# Patient Record
Sex: FEMALE | Race: WHITE | Employment: FULL TIME | URBAN - METROPOLITAN AREA
[De-identification: names, ages, dates, MRNs, and addresses within clinical notes are randomized per-mention and may not be internally consistent; named-entity substitution may affect disease eponyms.]

---

## 2019-07-18 ENCOUNTER — OFFICE VISIT (OUTPATIENT)
Dept: ORTHOPEDIC SURGERY | Age: 44
End: 2019-07-18
Payer: COMMERCIAL

## 2019-07-18 VITALS
SYSTOLIC BLOOD PRESSURE: 133 MMHG | DIASTOLIC BLOOD PRESSURE: 81 MMHG | WEIGHT: 293 LBS | BODY MASS INDEX: 45.99 KG/M2 | HEART RATE: 90 BPM | HEIGHT: 67 IN

## 2019-07-18 DIAGNOSIS — M72.2 PLANTAR FASCIITIS OF RIGHT FOOT: Primary | ICD-10-CM

## 2019-07-18 PROCEDURE — 1036F TOBACCO NON-USER: CPT | Performed by: PODIATRIST

## 2019-07-18 PROCEDURE — L4361 PNEUMA/VAC WALK BOOT PRE OTS: HCPCS | Performed by: PODIATRIST

## 2019-07-18 PROCEDURE — G8427 DOCREV CUR MEDS BY ELIG CLIN: HCPCS | Performed by: PODIATRIST

## 2019-07-18 PROCEDURE — G8417 CALC BMI ABV UP PARAM F/U: HCPCS | Performed by: PODIATRIST

## 2019-07-18 PROCEDURE — 99203 OFFICE O/P NEW LOW 30 MIN: CPT | Performed by: PODIATRIST

## 2019-07-18 RX ORDER — FUROSEMIDE 40 MG/1
TABLET ORAL
COMMUNITY
Start: 2019-06-30

## 2019-07-18 RX ORDER — PANTOPRAZOLE SODIUM 40 MG/1
40 TABLET, DELAYED RELEASE ORAL
COMMUNITY
Start: 2019-05-15

## 2019-07-18 RX ORDER — BUTALBITAL, ACETAMINOPHEN AND CAFFEINE 50; 325; 40 MG/1; MG/1; MG/1
TABLET ORAL
COMMUNITY
Start: 2019-01-03

## 2019-07-18 RX ORDER — DOXYCYCLINE HYCLATE 100 MG
TABLET ORAL
COMMUNITY
Start: 2019-07-18 | End: 2019-08-17

## 2019-07-18 RX ORDER — METFORMIN HYDROCHLORIDE 750 MG/1
TABLET, EXTENDED RELEASE ORAL
COMMUNITY
Start: 2016-12-18

## 2019-07-18 RX ORDER — FLUTICASONE PROPIONATE 50 MCG
1 SPRAY, SUSPENSION (ML) NASAL
COMMUNITY
Start: 2017-01-31 | End: 2020-03-07

## 2019-07-18 RX ORDER — NABUMETONE 750 MG/1
TABLET, FILM COATED ORAL
COMMUNITY
Start: 2019-04-29

## 2019-07-18 RX ORDER — MULTIVITAMIN
1 TABLET ORAL
COMMUNITY

## 2019-07-18 RX ORDER — SPIRONOLACTONE 25 MG/1
TABLET ORAL
COMMUNITY
Start: 2019-07-08

## 2019-07-18 RX ORDER — METOPROLOL TARTRATE 50 MG/1
TABLET, FILM COATED ORAL
Refills: 3 | COMMUNITY
Start: 2019-06-02

## 2019-07-18 SDOH — HEALTH STABILITY: MENTAL HEALTH: HOW OFTEN DO YOU HAVE A DRINK CONTAINING ALCOHOL?: MONTHLY OR LESS

## 2019-07-18 SDOH — HEALTH STABILITY: MENTAL HEALTH: HOW MANY STANDARD DRINKS CONTAINING ALCOHOL DO YOU HAVE ON A TYPICAL DAY?: 5 OR 6

## 2019-07-22 ENCOUNTER — TELEPHONE (OUTPATIENT)
Dept: ORTHOPEDIC SURGERY | Age: 44
End: 2019-07-22

## 2019-08-27 ENCOUNTER — OFFICE VISIT (OUTPATIENT)
Dept: ORTHOPEDIC SURGERY | Age: 44
End: 2019-08-27
Payer: COMMERCIAL

## 2019-08-27 VITALS
HEIGHT: 67 IN | WEIGHT: 293 LBS | DIASTOLIC BLOOD PRESSURE: 96 MMHG | SYSTOLIC BLOOD PRESSURE: 142 MMHG | BODY MASS INDEX: 45.99 KG/M2 | HEART RATE: 61 BPM

## 2019-08-27 DIAGNOSIS — M79.671 RIGHT FOOT PAIN: Primary | ICD-10-CM

## 2019-08-27 PROCEDURE — 99213 OFFICE O/P EST LOW 20 MIN: CPT | Performed by: NURSE PRACTITIONER

## 2019-08-27 PROCEDURE — 1036F TOBACCO NON-USER: CPT | Performed by: NURSE PRACTITIONER

## 2019-08-27 PROCEDURE — L3260 AMBULATORY SURGICAL BOOT EAC: HCPCS | Performed by: NURSE PRACTITIONER

## 2019-08-27 PROCEDURE — G8428 CUR MEDS NOT DOCUMENT: HCPCS | Performed by: NURSE PRACTITIONER

## 2019-08-27 PROCEDURE — E0114 CRUTCH UNDERARM PAIR NO WOOD: HCPCS | Performed by: NURSE PRACTITIONER

## 2019-08-27 PROCEDURE — G8417 CALC BMI ABV UP PARAM F/U: HCPCS | Performed by: NURSE PRACTITIONER

## 2019-08-28 NOTE — PROGRESS NOTES
[facility] Orthopedic Surgery  After Hours Clinic Note      CHIEF COMPLAINT:  Right heel pain after injury    History Obtained From:  patient    HISTORY OF PRESENT ILLNESS:    The patient is a 40 y.o. female who presents with acute onset heel pain. Foot Pain: Patient complains of right ankle pain. Onset of the symptoms was last night. Inciting event: injured while she was taking the trash out in the dark and heard something in the woods and turned and ran fast.. She felt a pop in the bottom of her heel. She has been unable to bear weight. Current symptoms include inability to bear weight and pain in the bottom of her heel. Aggravating symptoms: any weight bearing and walking. Rates pain a 7-8/10 VAS. Patient's course of pain: severe and not improving. Patient has had prior foot problems, plantar fasciitis of the right foot managed by Dr Maciel Boland in 7/2019 and was given a boot that does not fit d/t her calf size. Previous visits for this problem: none. Evaluation to date: none. Treatment to date: rest.         Past Medical History:    No past medical history on file. Past Surgical History:    No past surgical history on file. Social History     Tobacco Use    Smoking status: Never Smoker    Smokeless tobacco: Never Used   Substance Use Topics    Alcohol use: Yes     Frequency: Monthly or less     Drinks per session: 5 or 6       No family history on file. Current Medications:   No current facility-administered medications for this visit.    Current Outpatient Medications   Medication Sig Dispense Refill    butalbital-acetaminophen-caffeine (FIORICET, ESGIC) -40 MG per tablet TAKE 1 TABLET BY MOUTH THREE TIMES A DAY AS NEEDED      nabumetone (RELAFEN) 750 MG tablet TAKE 1 TABLET BY MOUTH TWICE A DAY      furosemide (LASIX) 40 MG tablet       furosemide (LASIX) 40 MG tablet TAKE 1 TABLET BY MOUTH EVERY DAY AS NEEDED      spironolactone (ALDACTONE) 25 MG tablet       metoprolol tartrate (LOPRESSOR) 50 MG tablet TAKE 1 TABLET BY MOUTH TWICE A DAY  3    metFORMIN (GLUCOPHAGE-XR) 750 MG extended release tablet TAKE 2 TABLETS (1,500 MG) BY ORAL ROUTE ONCE DAILY WITH THE EVENING MEAL FOR 90 DAYS      pantoprazole (PROTONIX) 40 MG tablet Take 40 mg by mouth      Multiple Vitamin (MULTIVITAMIN) tablet Take 1 tablet by mouth      fluticasone (FLONASE) 50 MCG/ACT nasal spray 1 spray by Nasal route      albuterol sulfate (PROAIR RESPICLICK) 645 (90 Base) MCG/ACT aerosol powder inhalation Inhale 2 puffs into the lungs       No current facility-administered medications for this visit. Allergies:  Morphine    REVIEW OF SYSTEMS:   CONSTITUTIONAL: Denies unexplained weight loss, fevers, chills or fatigue  NEUROLOGICAL: Denies unsteady gait or progressive weakness  MUSCULOSKELETAL: See HPI  PSYCHOLOGICAL: Denies anxiety, depression   SKIN: Denies skin changes, delayed healing, rash, itching   HEMATOLOGIC: Denies easy bleeding or bruising  ENDOCRINE: Denies excessive thirst, urination, heat/cold  RESPIRATORY: Denies current dyspnea, cough  GI: Denies nausea, vomiting, diarrhea   : Denies bowel or bladder issues       PHYSICAL EXAM:    VITALS:  BP (!) 142/96   Pulse 61   Ht 5' 7\" (1.702 m)   Wt 296 lb (134.3 kg)   BMI 46.36 kg/m²     Ms. Eduard Green is a very pleasant 40 y.o.  female who presents today in no acute distress, awake, alert, and oriented. She is well dressed, nourished and  groomed. Patient with normal affect. Height is  5' 7\" (1.702 m), weight is 296 lb (134.3 kg), Body mass index is 46.36 kg/m². Resting respiratory rate is 16. Skin warm and dry. Resp deep and easy. Pulse is with regular rate and rhythm        MUSCULOSKELETAL:    She ambulates NWB right foot d/t pain. On evaluation of the right foot, there is no swelling or bruising. Skin is intact in the right lower extremity. She has decreased ROM right foot and ankle d/t pain.  She can dorsiflex 5 degrees from neutral.

## 2019-09-06 ENCOUNTER — OFFICE VISIT (OUTPATIENT)
Dept: ORTHOPEDIC SURGERY | Age: 44
End: 2019-09-06
Payer: COMMERCIAL

## 2019-09-06 VITALS — BODY MASS INDEX: 45.99 KG/M2 | WEIGHT: 293 LBS | HEIGHT: 67 IN | RESPIRATION RATE: 16 BRPM

## 2019-09-06 DIAGNOSIS — M72.2 PLANTAR FASCIITIS: Primary | ICD-10-CM

## 2019-09-06 PROCEDURE — G8417 CALC BMI ABV UP PARAM F/U: HCPCS | Performed by: ORTHOPAEDIC SURGERY

## 2019-09-06 PROCEDURE — 1036F TOBACCO NON-USER: CPT | Performed by: ORTHOPAEDIC SURGERY

## 2019-09-06 PROCEDURE — 99213 OFFICE O/P EST LOW 20 MIN: CPT | Performed by: ORTHOPAEDIC SURGERY

## 2019-09-06 PROCEDURE — G8427 DOCREV CUR MEDS BY ELIG CLIN: HCPCS | Performed by: ORTHOPAEDIC SURGERY

## 2019-09-06 PROCEDURE — L3170 FOOT PLAS HEEL STABI PRE OTS: HCPCS | Performed by: ORTHOPAEDIC SURGERY

## 2019-09-17 PROBLEM — M72.2 PLANTAR FASCIITIS: Status: ACTIVE | Noted: 2019-09-17

## 2019-10-18 ENCOUNTER — OFFICE VISIT (OUTPATIENT)
Dept: ORTHOPEDIC SURGERY | Age: 44
End: 2019-10-18
Payer: COMMERCIAL

## 2019-10-18 VITALS — BODY MASS INDEX: 45.99 KG/M2 | HEIGHT: 67 IN | RESPIRATION RATE: 16 BRPM | WEIGHT: 293 LBS

## 2019-10-18 DIAGNOSIS — M72.2 PLANTAR FASCIITIS OF RIGHT FOOT: Primary | ICD-10-CM

## 2019-10-18 PROCEDURE — G8417 CALC BMI ABV UP PARAM F/U: HCPCS | Performed by: ORTHOPAEDIC SURGERY

## 2019-10-18 PROCEDURE — G8427 DOCREV CUR MEDS BY ELIG CLIN: HCPCS | Performed by: ORTHOPAEDIC SURGERY

## 2019-10-18 PROCEDURE — 1036F TOBACCO NON-USER: CPT | Performed by: ORTHOPAEDIC SURGERY

## 2019-10-18 PROCEDURE — 99213 OFFICE O/P EST LOW 20 MIN: CPT | Performed by: ORTHOPAEDIC SURGERY

## 2019-10-18 PROCEDURE — G8484 FLU IMMUNIZE NO ADMIN: HCPCS | Performed by: ORTHOPAEDIC SURGERY

## 2021-01-21 ENCOUNTER — OFFICE VISIT (OUTPATIENT)
Dept: ORTHOPEDIC SURGERY | Age: 46
End: 2021-01-21
Payer: COMMERCIAL

## 2021-01-21 VITALS — TEMPERATURE: 97.1 F

## 2021-01-21 DIAGNOSIS — M76.821 TIBIALIS POSTERIOR TENDINITIS, RIGHT: ICD-10-CM

## 2021-01-21 DIAGNOSIS — M79.671 RIGHT FOOT PAIN: Primary | ICD-10-CM

## 2021-01-21 DIAGNOSIS — M25.571 RIGHT ANKLE PAIN, UNSPECIFIED CHRONICITY: ICD-10-CM

## 2021-01-21 PROCEDURE — G8484 FLU IMMUNIZE NO ADMIN: HCPCS | Performed by: PHYSICIAN ASSISTANT

## 2021-01-21 PROCEDURE — G8421 BMI NOT CALCULATED: HCPCS | Performed by: PHYSICIAN ASSISTANT

## 2021-01-21 PROCEDURE — 1036F TOBACCO NON-USER: CPT | Performed by: PHYSICIAN ASSISTANT

## 2021-01-21 PROCEDURE — 99212 OFFICE O/P EST SF 10 MIN: CPT | Performed by: PHYSICIAN ASSISTANT

## 2021-01-21 PROCEDURE — G8427 DOCREV CUR MEDS BY ELIG CLIN: HCPCS | Performed by: PHYSICIAN ASSISTANT

## 2021-01-21 PROCEDURE — L1902 AFO ANKLE GAUNTLET PRE OTS: HCPCS | Performed by: PHYSICIAN ASSISTANT

## 2021-01-22 NOTE — PROGRESS NOTES
Subjective:      Patient ID: Kourtney Al is a 39 y.o. female. Chief Complaint   Patient presents with    Foot Pain     Right foot        HPI:   She is here for an initial evaluation of a new problem. Right medial foot and ankle pain. Injured on 1/20/2021, rolled ankle and foot avoiding tripping over a cat. Pain with weightbearing. Pain Scale 5/10 VAS. Location of pain over the medial ankle and foot. Pain is worse with activity. Pain improves with rest.   Previous treatments have included Tylenol with minimal improvement. Review Of Systems:   A 14 point review of systems and history form completed by the patient has been reviewed. This scanned in the media tab of the patient's chart under today's date. As noted in the HPI. Negative for fever or chills. Negative for joint pain, swelling and stiffness. Negative for numbness or tingling. History reviewed. No pertinent past medical history. History reviewed. No pertinent family history. History reviewed. No pertinent surgical history.     Social History     Occupational History    Not on file   Tobacco Use    Smoking status: Never Smoker    Smokeless tobacco: Never Used   Substance and Sexual Activity    Alcohol use: Yes     Frequency: Monthly or less     Drinks per session: 5 or 6    Drug use: Never    Sexual activity: Yes     Partners: Male       Current Outpatient Medications   Medication Sig Dispense Refill    butalbital-acetaminophen-caffeine (FIORICET, ESGIC) -40 MG per tablet TAKE 1 TABLET BY MOUTH THREE TIMES A DAY AS NEEDED      nabumetone (RELAFEN) 750 MG tablet TAKE 1 TABLET BY MOUTH TWICE A DAY      furosemide (LASIX) 40 MG tablet       furosemide (LASIX) 40 MG tablet TAKE 1 TABLET BY MOUTH EVERY DAY AS NEEDED      spironolactone (ALDACTONE) 25 MG tablet       metoprolol tartrate (LOPRESSOR) 50 MG tablet TAKE 1 TABLET BY MOUTH TWICE A DAY  3  metFORMIN (GLUCOPHAGE-XR) 750 MG extended release tablet TAKE 2 TABLETS (1,500 MG) BY ORAL ROUTE ONCE DAILY WITH THE EVENING MEAL FOR 90 DAYS      pantoprazole (PROTONIX) 40 MG tablet Take 40 mg by mouth      Multiple Vitamin (MULTIVITAMIN) tablet Take 1 tablet by mouth      fluticasone (FLONASE) 50 MCG/ACT nasal spray 1 spray by Nasal route      albuterol sulfate (PROAIR RESPICLICK) 323 (90 Base) MCG/ACT aerosol powder inhalation Inhale 2 puffs into the lungs       No current facility-administered medications for this visit. Objective:     She is alert, oriented x 3, pleasant, well nourished, developed and in no   acute distress. Temp 97.1 °F (36.2 °C) (Temporal)      Examination of the right foot and  ankle demonstrates: There is mild swelling of the medial ankle. There is no joint effusion. There is no tenderness of the lateral malleolus. There is  moderate tenderness over the posterior tibialis tendon region. There is moderate pronation in stance. There is no tenderness of the fifth metatarsal.  There is no tenderness over the ATFL. There is no tenderness over the proximal fibula. There is no tenderness of the calcaneous. The achilles is intact. Rivera test negative. There is no laxity with anterior drawer testing. There is no laxity with Inversion testing. There is no laxity with Eversion testing. Examination of the upper extremities are intact with sensation to light touch. Motor testing  5/5 in all major motor groups including hand intrinsics. Radial, Median and Ulnar nerves are intact. Patel's Sign absent. Examination of the upper extremities shows intact perfusion to all extremities. No cyanosis. Digits are warm to touch. Capillary refill is less than 2 seconds. There is no edema noted. Examination of the skin over the upper extremities:  Reveals the skin to be intact without lacerations or abrasions. There are no significant erythema, rashes or skin lesions. X Rays: performed in the office today:   AP, Lateral and Oblique of the Right Foot:  Radiographs demonstrate normal bony alignment of the foot. There is no radiographic evidence of a fracture or dislocation. AP, Lateral and Oblique Right Ankle:  Radiographs demonstrate normal alignment of the ankle joint. There are no fractures or dislocations noted. Additional Tests reviewed: None  Additional Outside Records reviewed: None    Diagnosis:       ICD-10-CM    1. Right foot pain  M79.671 XR FOOT RIGHT (MIN 3 VIEWS)   2. Right ankle pain, unspecified chronicity  M25.571 XR ANKLE RIGHT (MIN 3 VIEWS)   3. Tibialis posterior tendinitis, right  M76.821 Aircast Airlift PTTD Ankle Brace        Assessment and Plan:       Assessment:  Right medial foot and ankle pain felt to be related to a strain of the posterior tibialis tendon. 15 minutes was the total time spent on today's visit  including reviewing test results, obtaining or reviewing history, physical exam, time spent on documentation or ordering prescriptions, tests and procedures after the visit. Plan:  Medications- OTC NSAIDS discussed. She  was advised that NSAID-type medications have two very important potential side effects: gastrointestinal irritation including hemorrhage and renal injuries. She was asked to take the medication with food and to stop if she experiences any GI upset. I asked her to call for vomiting, abdominal pain or black/bloody stools. She should have renal function testing per his medical provider periodically. The patient expresses understanding of these issues and questions were answered. PT- A home exercise program was instructed today including ROM exercises and strengthening exercises. The patient verbalized understanding of these exercises as well as the importance of the exercise program to promote return of normal function. If pain intensifies or other problems arise you are to notify the office. Further Imaging- none    Procedures-recommend bracing in a posterior tibialis tendon dysfunctional brace      Procedures    Aircast Airlift PTTD Ankle Brace     Patient was prescribed an Aircast Airlift PTTD Brace. The right ankle will require stabilization / immobilization from this semi-rigid / rigid orthosis to improve their function. The orthosis will assist in protecting the affected area, provide functional support and facilitate healing. Patient was instructed to progress ambulation weight bearing as tolerated in the device. The patient was educated and fit by a healthcare professional with expert knowledge and specialization in brace application while under the direct supervision of the treating physician. Verbal and written instructions for the use of and application of this item were provided. They were instructed to contact the office immediately should the brace result in increased pain, decreased sensation, increased swelling or worsening of the condition. Follow up- 2 weeks. Call or return to clinic if these symptoms worsen or fail to improve as anticipated.

## 2022-11-29 ENCOUNTER — TELEPHONE (OUTPATIENT)
Dept: FAMILY MEDICINE CLINIC | Age: 47
End: 2022-11-29

## 2022-11-29 NOTE — TELEPHONE ENCOUNTER
----- Message from Jessee Nikolay sent at 11/29/2022  8:59 AM EST -----  Subject: Appointment Request    Reason for Call: New Patient/New to Provider Appointment needed: New   Patient Request Appointment    QUESTIONS    Reason for appointment request? Requested Provider unavailable - Jessica Handing      Additional Information for Provider? Patient was referred by a friend to   see Dr Angelo Briones. She was wanting to know if she can establish care.    Please advise.   ---------------------------------------------------------------------------  --------------  Jigar Morgan [facility]  [phone]; OK to leave message on voicemail  ---------------------------------------------------------------------------  --------------  SCRIPT ISATU ABDIID Screen: Alf Pearl

## 2023-08-11 ENCOUNTER — OFFICE VISIT (OUTPATIENT)
Dept: FAMILY MEDICINE CLINIC | Age: 48
End: 2023-08-11
Payer: COMMERCIAL

## 2023-08-11 VITALS
RESPIRATION RATE: 16 BRPM | BODY MASS INDEX: 45.99 KG/M2 | HEART RATE: 75 BPM | HEIGHT: 67 IN | DIASTOLIC BLOOD PRESSURE: 78 MMHG | OXYGEN SATURATION: 98 % | SYSTOLIC BLOOD PRESSURE: 122 MMHG | WEIGHT: 293 LBS

## 2023-08-11 DIAGNOSIS — R53.83 FATIGUE, UNSPECIFIED TYPE: ICD-10-CM

## 2023-08-11 DIAGNOSIS — E61.1 IRON DEFICIENCY: Primary | ICD-10-CM

## 2023-08-11 DIAGNOSIS — R73.03 PREDIABETES: ICD-10-CM

## 2023-08-11 DIAGNOSIS — R60.0 BILATERAL LOWER EXTREMITY EDEMA: ICD-10-CM

## 2023-08-11 PROCEDURE — 1036F TOBACCO NON-USER: CPT | Performed by: INTERNAL MEDICINE

## 2023-08-11 PROCEDURE — 99204 OFFICE O/P NEW MOD 45 MIN: CPT | Performed by: INTERNAL MEDICINE

## 2023-08-11 PROCEDURE — G8427 DOCREV CUR MEDS BY ELIG CLIN: HCPCS | Performed by: INTERNAL MEDICINE

## 2023-08-11 PROCEDURE — G8417 CALC BMI ABV UP PARAM F/U: HCPCS | Performed by: INTERNAL MEDICINE

## 2023-08-11 RX ORDER — FERROUS SULFATE 324(65)MG
1 TABLET, DELAYED RELEASE (ENTERIC COATED) ORAL 2 TIMES DAILY
COMMUNITY
Start: 2023-03-21

## 2023-08-11 RX ORDER — KETOROLAC TROMETHAMINE 5 MG/ML
1 SOLUTION OPHTHALMIC 4 TIMES DAILY
COMMUNITY

## 2023-08-11 RX ORDER — LEVOCETIRIZINE DIHYDROCHLORIDE 5 MG/1
5 TABLET, FILM COATED ORAL EVERY EVENING
COMMUNITY
Start: 2023-07-31

## 2023-08-11 RX ORDER — METHOCARBAMOL 750 MG/1
750 TABLET, FILM COATED ORAL 4 TIMES DAILY PRN
COMMUNITY
Start: 2022-08-25

## 2023-08-11 RX ORDER — IBUPROFEN 800 MG/1
800 TABLET ORAL EVERY 6 HOURS PRN
COMMUNITY
Start: 2021-06-21

## 2023-08-11 RX ORDER — LIRAGLUTIDE 6 MG/ML
INJECTION, SOLUTION SUBCUTANEOUS
COMMUNITY
Start: 2023-07-05

## 2023-08-11 RX ORDER — ONDANSETRON 4 MG/1
4 TABLET, ORALLY DISINTEGRATING ORAL EVERY 6 HOURS PRN
COMMUNITY
Start: 2023-01-05

## 2023-08-11 ASSESSMENT — ENCOUNTER SYMPTOMS
WHEEZING: 1
CONSTIPATION: 1
EYE PAIN: 0
SHORTNESS OF BREATH: 1
NAUSEA: 0
COLOR CHANGE: 0
DIARRHEA: 1
VOMITING: 0

## 2023-08-11 ASSESSMENT — PATIENT HEALTH QUESTIONNAIRE - PHQ9
SUM OF ALL RESPONSES TO PHQ QUESTIONS 1-9: 2
SUM OF ALL RESPONSES TO PHQ9 QUESTIONS 1 & 2: 2
2. FEELING DOWN, DEPRESSED OR HOPELESS: 1
SUM OF ALL RESPONSES TO PHQ QUESTIONS 1-9: 2
SUM OF ALL RESPONSES TO PHQ QUESTIONS 1-9: 2
1. LITTLE INTEREST OR PLEASURE IN DOING THINGS: 1
SUM OF ALL RESPONSES TO PHQ QUESTIONS 1-9: 2

## 2023-08-11 NOTE — PROGRESS NOTES
rash (right cheek going to to to derm, had skin cancer). Negative for color change. Neurological:  Positive for headaches. Negative for dizziness. Psychiatric/Behavioral:  Positive for dysphoric mood. The patient is nervous/anxious. Seeing a therapist       Prior to Visit Medications    Medication Sig Taking? Authorizing Provider   levocetirizine (XYZAL) 5 MG tablet Take 1 tablet by mouth every evening Yes Historical Provider, MD   SAXENDA 18 MG/3ML SOPN USE 2.4 MG DAILY FOR 7 DAYS, THEN 3 MG DAILY  DAYS.  USE 1.8 MG DAILY Yes Historical Provider, MD   metFORMIN (GLUCOPHAGE) 1000 MG tablet Take 1 tablet by mouth 2 times daily Yes Historical Provider, MD   Ferrous Sulfate 324 MG TBEC Take 1 tablet by mouth 2 times daily Yes Historical Provider, MD   ketorolac (ACULAR) 0.5 % ophthalmic solution Apply 1 drop to eye 4 times daily Yes Historical Provider, MD   ondansetron (ZOFRAN-ODT) 4 MG disintegrating tablet Take 1 tablet by mouth every 6 hours as needed Yes Historical Provider, MD   ibuprofen (ADVIL;MOTRIN) 800 MG tablet Take 1 tablet by mouth every 6 hours as needed Yes Historical Provider, MD   methocarbamol (ROBAXIN) 750 MG tablet Take 1 tablet by mouth 4 times daily as needed Yes Historical Provider, MD   butalbital-acetaminophen-caffeine (FIORICET, ESGIC) -40 MG per tablet TAKE 1 TABLET BY MOUTH THREE TIMES A DAY AS NEEDED Yes Historical Provider, MD   nabumetone (RELAFEN) 750 MG tablet TAKE 1 TABLET BY MOUTH TWICE A DAY Yes Historical Provider, MD   furosemide (LASIX) 40 MG tablet TAKE 1 TABLET BY MOUTH EVERY DAY AS NEEDED Yes Historical Provider, MD   spironolactone (ALDACTONE) 25 MG tablet  Yes Historical Provider, MD   metoprolol tartrate (LOPRESSOR) 50 MG tablet TAKE 1 TABLET BY MOUTH TWICE A DAY Yes Historical Provider, MD   pantoprazole (PROTONIX) 40 MG tablet Take 1 tablet by mouth Yes Historical Provider, MD   Multiple Vitamin (MULTIVITAMIN) tablet Take 1 tablet by mouth Yes

## 2023-09-08 ENCOUNTER — TELEPHONE (OUTPATIENT)
Dept: FAMILY MEDICINE CLINIC | Age: 48
End: 2023-09-08

## 2023-09-08 RX ORDER — ROSUVASTATIN CALCIUM 10 MG/1
10 TABLET, COATED ORAL DAILY
Qty: 90 TABLET | Refills: 1 | Status: SHIPPED | OUTPATIENT
Start: 2023-09-08

## 2023-09-08 RX ORDER — PANTOPRAZOLE SODIUM 40 MG/1
40 TABLET, DELAYED RELEASE ORAL DAILY
Qty: 30 TABLET | Refills: 5 | Status: SHIPPED | OUTPATIENT
Start: 2023-09-08

## 2023-09-08 NOTE — TELEPHONE ENCOUNTER
Pt is needing refills on Pantoprazole and Rosuvastatin Calcium 10 mg tablet. Requested pantoprazole but Rosuvastatin not on med list.    Pt is going out of town Sunday and is needing meds before then.     Ph[facility] [address]    Please advise  Pt can be reached at [phone]

## 2023-09-19 ENCOUNTER — OFFICE VISIT (OUTPATIENT)
Dept: FAMILY MEDICINE CLINIC | Age: 48
End: 2023-09-19
Payer: COMMERCIAL

## 2023-09-19 VITALS
HEART RATE: 74 BPM | WEIGHT: 293 LBS | SYSTOLIC BLOOD PRESSURE: 118 MMHG | TEMPERATURE: 98.4 F | HEIGHT: 67 IN | RESPIRATION RATE: 18 BRPM | DIASTOLIC BLOOD PRESSURE: 72 MMHG | OXYGEN SATURATION: 97 % | BODY MASS INDEX: 45.99 KG/M2

## 2023-09-19 DIAGNOSIS — R50.9 FEBRILE ILLNESS, ACUTE: Primary | ICD-10-CM

## 2023-09-19 DIAGNOSIS — J02.9 SORE THROAT: ICD-10-CM

## 2023-09-19 LAB
INFLUENZA A ANTIBODY: NORMAL
INFLUENZA B ANTIBODY: NORMAL
Lab: NORMAL
PERFORMING INSTRUMENT: NORMAL
QC PASS/FAIL: NORMAL
S PYO AG THROAT QL: NORMAL
SARS-COV-2, POC: NORMAL

## 2023-09-19 PROCEDURE — 87804 INFLUENZA ASSAY W/OPTIC: CPT | Performed by: NURSE PRACTITIONER

## 2023-09-19 PROCEDURE — 87880 STREP A ASSAY W/OPTIC: CPT | Performed by: NURSE PRACTITIONER

## 2023-09-19 PROCEDURE — G8427 DOCREV CUR MEDS BY ELIG CLIN: HCPCS | Performed by: NURSE PRACTITIONER

## 2023-09-19 PROCEDURE — 99213 OFFICE O/P EST LOW 20 MIN: CPT | Performed by: NURSE PRACTITIONER

## 2023-09-19 PROCEDURE — G8417 CALC BMI ABV UP PARAM F/U: HCPCS | Performed by: NURSE PRACTITIONER

## 2023-09-19 PROCEDURE — 87426 SARSCOV CORONAVIRUS AG IA: CPT | Performed by: NURSE PRACTITIONER

## 2023-09-19 PROCEDURE — 1036F TOBACCO NON-USER: CPT | Performed by: NURSE PRACTITIONER

## 2023-09-19 SDOH — ECONOMIC STABILITY: FOOD INSECURITY: WITHIN THE PAST 12 MONTHS, YOU WORRIED THAT YOUR FOOD WOULD RUN OUT BEFORE YOU GOT MONEY TO BUY MORE.: NEVER TRUE

## 2023-09-19 SDOH — ECONOMIC STABILITY: HOUSING INSECURITY
IN THE LAST 12 MONTHS, WAS THERE A TIME WHEN YOU DID NOT HAVE A STEADY PLACE TO SLEEP OR SLEPT IN A SHELTER (INCLUDING NOW)?: NO

## 2023-09-19 SDOH — ECONOMIC STABILITY: INCOME INSECURITY: HOW HARD IS IT FOR YOU TO PAY FOR THE VERY BASICS LIKE FOOD, HOUSING, MEDICAL CARE, AND HEATING?: NOT HARD AT ALL

## 2023-09-19 SDOH — ECONOMIC STABILITY: FOOD INSECURITY: WITHIN THE PAST 12 MONTHS, THE FOOD YOU BOUGHT JUST DIDN'T LAST AND YOU DIDN'T HAVE MONEY TO GET MORE.: NEVER TRUE

## 2023-09-19 ASSESSMENT — ENCOUNTER SYMPTOMS
COUGH: 0
SINUS PAIN: 0
NAUSEA: 0
SINUS PRESSURE: 0
SHORTNESS OF BREATH: 0
RHINORRHEA: 0
VOMITING: 0
SORE THROAT: 1

## 2023-09-20 ENCOUNTER — TELEPHONE (OUTPATIENT)
Dept: FAMILY MEDICINE CLINIC | Age: 48
End: 2023-09-20

## 2023-09-20 ENCOUNTER — HOSPITAL ENCOUNTER (OUTPATIENT)
Age: 48
Discharge: HOME | End: 2023-09-20
Payer: COMMERCIAL

## 2023-09-20 ENCOUNTER — OFFICE VISIT (OUTPATIENT)
Dept: FAMILY MEDICINE CLINIC | Age: 48
End: 2023-09-20
Payer: COMMERCIAL

## 2023-09-20 ENCOUNTER — HOSPITAL ENCOUNTER (OUTPATIENT)
Dept: VASCULAR LAB | Age: 48
Discharge: HOME | End: 2023-09-20
Payer: COMMERCIAL

## 2023-09-20 VITALS
DIASTOLIC BLOOD PRESSURE: 78 MMHG | OXYGEN SATURATION: 98 % | TEMPERATURE: 98.4 F | HEIGHT: 67 IN | WEIGHT: 293 LBS | SYSTOLIC BLOOD PRESSURE: 136 MMHG | HEART RATE: 85 BPM | BODY MASS INDEX: 45.99 KG/M2 | RESPIRATION RATE: 18 BRPM

## 2023-09-20 DIAGNOSIS — B08.4 HAND, FOOT AND MOUTH DISEASE: ICD-10-CM

## 2023-09-20 DIAGNOSIS — R30.0 DYSURIA: ICD-10-CM

## 2023-09-20 DIAGNOSIS — R60.0 BILATERAL LEG EDEMA: ICD-10-CM

## 2023-09-20 DIAGNOSIS — R60.0 BILATERAL LEG EDEMA: Primary | ICD-10-CM

## 2023-09-20 LAB
ALBUMIN SERPL-MCNC: 4.8 G/DL (ref 3.4–5)
ALBUMIN/GLOB SERPL: 2.1 {RATIO} (ref 1.1–2.2)
ALP SERPL-CCNC: 71 U/L (ref 40–129)
ALT SERPL-CCNC: 36 U/L (ref 10–40)
ANION GAP SERPL CALCULATED.3IONS-SCNC: 18 MMOL/L (ref 3–16)
AST SERPL-CCNC: 23 U/L (ref 15–37)
BASOPHILS # BLD: 0 K/UL (ref 0–0.2)
BASOPHILS NFR BLD: 0.4 %
BILIRUB SERPL-MCNC: <0.2 MG/DL (ref 0–1)
BILIRUBIN, POC: NORMAL
BLOOD URINE, POC: NORMAL
BUN SERPL-MCNC: 11 MG/DL (ref 7–20)
CALCIUM SERPL-MCNC: 8.9 MG/DL (ref 8.3–10.6)
CHLORIDE SERPL-SCNC: 103 MMOL/L (ref 99–110)
CLARITY, POC: CLEAR
CO2 SERPL-SCNC: 25 MMOL/L (ref 21–32)
COLOR, POC: YELLOW
CREAT SERPL-MCNC: 0.9 MG/DL (ref 0.6–1.1)
DEPRECATED RDW RBC AUTO: 14.2 % (ref 12.4–15.4)
EOSINOPHIL # BLD: 0.1 K/UL (ref 0–0.6)
EOSINOPHIL NFR BLD: 0.7 %
GFR SERPLBLD CREATININE-BSD FMLA CKD-EPI: >60 ML/MIN/{1.73_M2}
GLUCOSE SERPL-MCNC: 86 MG/DL (ref 70–99)
GLUCOSE URINE, POC: NORMAL
HCT VFR BLD AUTO: 37.4 % (ref 36–48)
HGB BLD-MCNC: 13.2 G/DL (ref 12–16)
KETONES, POC: NORMAL
LEUKOCYTE EST, POC: NORMAL
LYMPHOCYTES # BLD: 2.2 K/UL (ref 1–5.1)
LYMPHOCYTES NFR BLD: 27.9 %
MCH RBC QN AUTO: 30 PG (ref 26–34)
MCHC RBC AUTO-ENTMCNC: 35.2 G/DL (ref 31–36)
MCV RBC AUTO: 85.3 FL (ref 80–100)
MONOCYTES # BLD: 0.6 K/UL (ref 0–1.3)
MONOCYTES NFR BLD: 7.4 %
NEUTROPHILS # BLD: 5 K/UL (ref 1.7–7.7)
NEUTROPHILS NFR BLD: 63.6 %
NITRITE, POC: NORMAL
PH, POC: 5
PLATELET # BLD AUTO: 184 K/UL (ref 135–450)
PMV BLD AUTO: 8.3 FL (ref 5–10.5)
POTASSIUM SERPL-SCNC: 3.3 MMOL/L (ref 3.5–5.1)
PROT SERPL-MCNC: 7.1 G/DL (ref 6.4–8.2)
PROTEIN, POC: NORMAL
RBC # BLD AUTO: 4.38 M/UL (ref 4–5.2)
SODIUM SERPL-SCNC: 146 MMOL/L (ref 136–145)
SPECIFIC GRAVITY, POC: 1.02
UROBILINOGEN, POC: 0.2
WBC # BLD AUTO: 7.8 K/UL (ref 4–11)

## 2023-09-20 PROCEDURE — 1036F TOBACCO NON-USER: CPT | Performed by: NURSE PRACTITIONER

## 2023-09-20 PROCEDURE — 99214 OFFICE O/P EST MOD 30 MIN: CPT | Performed by: NURSE PRACTITIONER

## 2023-09-20 PROCEDURE — G8427 DOCREV CUR MEDS BY ELIG CLIN: HCPCS | Performed by: NURSE PRACTITIONER

## 2023-09-20 PROCEDURE — 93970 EXTREMITY STUDY: CPT

## 2023-09-20 PROCEDURE — G8417 CALC BMI ABV UP PARAM F/U: HCPCS | Performed by: NURSE PRACTITIONER

## 2023-09-20 PROCEDURE — 81002 URINALYSIS NONAUTO W/O SCOPE: CPT | Performed by: NURSE PRACTITIONER

## 2023-09-20 RX ORDER — DOXYCYCLINE HYCLATE 100 MG
100 TABLET ORAL 2 TIMES DAILY
Qty: 20 TABLET | Refills: 0 | Status: SHIPPED | OUTPATIENT
Start: 2023-09-20 | End: 2023-09-30

## 2023-09-20 ASSESSMENT — ENCOUNTER SYMPTOMS
WHEEZING: 0
SORE THROAT: 1
NAUSEA: 0
SINUS PRESSURE: 0
RHINORRHEA: 0
VOMITING: 0
COLOR CHANGE: 1
SHORTNESS OF BREATH: 0
COUGH: 0

## 2023-09-20 NOTE — TELEPHONE ENCOUNTER
Patient was seen yesterday by Leanna Ennis and she woke up this morning with both of her feet swollen & a rash on both feet. She says they feel like she's walking on pins & needles. She said they hurt to walk and she cannot put her shoes on. She did say she felt like she got stung by something when [place] was in the ocean but didn't think anything of it. She didn't mention anything to Leanna Ennis yesterday as she just thought her feet were hurting from doing a lot of walking.     Patient scheduled at 10:40 with Leanna Ennis unless she should go to the ED    Please Advise

## 2023-09-20 NOTE — PROGRESS NOTES
9/20/2023    This is a 50 y.o. female   Chief Complaint   Patient presents with    Foot Pain     Started last night. Bilateral foot pain and swelling. Redness. Was at [facility] Saturday and may have stepped on jelly fish. Julee Jasonaga MORENO    Today, 9/20/2023, patient returns to the office for bilateral leg/feet swelling and burning pain that started last night (9/19/2023). Patient states it feels like \"\"hot pins and needles\" in her feet. Admits to burning, tingling, swelling, constant pain, difficulty walking, redness and sores of both lower legs and feet. OTC pain medication did not help. States she put hydrocortizone, aquaphor, and antifungal cream on both legs without relief. She also admits to dysuria that started today. Denies recent sick contacts, nausea, vomiting, and weight loss. Patient was seen in the office 9/19/2023 for fever and sore throat. She was advised to do supportive care for symptoms. Fever has since resolved, but sore throat is still present along with this new bilateral leg and feet swelling/pain. She just returned from [facility] on Sunday 9/17/2023. She traveled by car for 12 hours and stopped 5 times. Reports that sore throat is slightly better today than yesterday. Previous rapid strep testing, COVID-19 and influenza was negative. Patient Active Problem List   Diagnosis    Plantar fasciitis of right foot    Tibialis posterior tendinitis, right    Right foot pain    Right ankle pain       Current Outpatient Medications   Medication Sig Dispense Refill    pantoprazole (PROTONIX) 40 MG tablet Take 1 tablet by mouth daily 30 tablet 5    rosuvastatin (CRESTOR) 10 MG tablet Take 1 tablet by mouth daily 90 tablet 1    levocetirizine (XYZAL) 5 MG tablet Take 1 tablet by mouth every evening      SAXENDA 18 MG/3ML SOPN USE 2.4 MG DAILY FOR 7 DAYS, THEN 3 MG DAILY  DAYS.  USE 1.8 MG DAILY      metFORMIN (GLUCOPHAGE) 1000 MG tablet Take 1 tablet by mouth 2 times daily      Ferrous Sulfate

## 2023-09-21 DIAGNOSIS — E87.6 HYPOKALEMIA: Primary | ICD-10-CM

## 2023-09-23 LAB — BACTERIA THROAT AEROBE CULT: NORMAL

## 2023-09-25 DIAGNOSIS — E87.6 HYPOKALEMIA: ICD-10-CM

## 2023-09-25 LAB
ANION GAP SERPL CALCULATED.3IONS-SCNC: 12 MMOL/L (ref 3–16)
BUN SERPL-MCNC: 9 MG/DL (ref 7–20)
CALCIUM SERPL-MCNC: 8.8 MG/DL (ref 8.3–10.6)
CHLORIDE SERPL-SCNC: 104 MMOL/L (ref 99–110)
CO2 SERPL-SCNC: 24 MMOL/L (ref 21–32)
CREAT SERPL-MCNC: 0.8 MG/DL (ref 0.6–1.1)
GFR SERPLBLD CREATININE-BSD FMLA CKD-EPI: >60 ML/MIN/{1.73_M2}
GLUCOSE SERPL-MCNC: 110 MG/DL (ref 70–99)
POTASSIUM SERPL-SCNC: 4.2 MMOL/L (ref 3.5–5.1)
SODIUM SERPL-SCNC: 140 MMOL/L (ref 136–145)

## 2023-09-26 ENCOUNTER — OFFICE VISIT (OUTPATIENT)
Dept: FAMILY MEDICINE CLINIC | Age: 48
End: 2023-09-26
Payer: COMMERCIAL

## 2023-09-26 VITALS
OXYGEN SATURATION: 97 % | SYSTOLIC BLOOD PRESSURE: 122 MMHG | WEIGHT: 293 LBS | HEART RATE: 83 BPM | HEIGHT: 67 IN | RESPIRATION RATE: 16 BRPM | DIASTOLIC BLOOD PRESSURE: 78 MMHG | BODY MASS INDEX: 45.99 KG/M2

## 2023-09-26 DIAGNOSIS — Z00.00 LABORATORY TESTS ORDERED AS PART OF A COMPLETE PHYSICAL EXAM (CPE): ICD-10-CM

## 2023-09-26 DIAGNOSIS — S90.425A BLISTER OF TOE OF LEFT FOOT WITHOUT INFECTION, INITIAL ENCOUNTER: ICD-10-CM

## 2023-09-26 DIAGNOSIS — B08.4 HAND, FOOT AND MOUTH DISEASE: Primary | ICD-10-CM

## 2023-09-26 PROCEDURE — 99213 OFFICE O/P EST LOW 20 MIN: CPT | Performed by: INTERNAL MEDICINE

## 2023-09-26 PROCEDURE — G8417 CALC BMI ABV UP PARAM F/U: HCPCS | Performed by: INTERNAL MEDICINE

## 2023-09-26 PROCEDURE — G8427 DOCREV CUR MEDS BY ELIG CLIN: HCPCS | Performed by: INTERNAL MEDICINE

## 2023-09-26 PROCEDURE — 1036F TOBACCO NON-USER: CPT | Performed by: INTERNAL MEDICINE

## 2023-09-26 NOTE — PROGRESS NOTES
Toshia Castillo (:  1975) is a 50 y.o. female, here for evaluation of the following chief complaint(s):  Rash; Foot Swelling (Patient is here for a follow up. She had feet swelling in both feet and a rash. Brendan Mark stated the patient had hand foot and mouth ); and hand, foot, and mouth    Ranjit Briseno was seen today for rash, foot swelling and hand, foot, and mouth. Diagnoses and all orders for this visit:    Hand, foot and mouth disease    Blister of toe of left foot without infection, initial encounter    Laboratory tests ordered as part of a complete physical exam (CPE)  -     Lipid Panel; Future  -     TSH with Reflex; Future  -     Hemoglobin A1C; Future    Other orders  -     Wound Dressings (MEDIHONEY) GEL gel; Apply 1 each topically as needed (apply to wound)     Await coxsackie virus abs  She is getting better  Follow up cpe about  2 weeks  Apply medihoney to toe with non stick pad to the toe      Subjective   SUBJECTIVE/OBJECTIVE:  HPI  Feet are a little better. Pain sometimes gets the tingle  Still in terminal thirst  Throat dose not hurt as bad  Drinking hot tea. Overall feeling better.   Did not go to work for a week    Swallowing ok  Toes         No results found for: \"LABA1C\"    Sodium (mmol/L)   Date Value   2023 140   2023 146 (H)     Potassium (mmol/L)   Date Value   2023 4.2   2023 3.3 (L)     Chloride (mmol/L)   Date Value   2023 104   2023 103     CO2 (mmol/L)   Date Value   2023 24   2023 25     BUN (mg/dL)   Date Value   2023 9   2023 11     Creatinine (mg/dL)   Date Value   2023 0.8   2023 0.9     Glucose (mg/dL)   Date Value   2023 110 (H)   2023 86     Calcium (mg/dL)   Date Value   2023 8.8   2023 8.9       No results found for: \"CHOL\", \"TRIG\", \"HDL\", \"LDLCALC\", \"LDLDIRECT\"    ALT (U/L)   Date Value   2023 36     AST (U/L)   Date Value   2023 23       No results found

## 2023-09-30 LAB
CV B1 NAB TITR SER NT: NORMAL {TITER}
CV B2 NAB TITR SER NT: NORMAL {TITER}
CV B3 NAB TITR SER NT: NORMAL {TITER}
CV B4 NAB TITR SER NT: NORMAL {TITER}
CV B5 NAB TITR SER NT: NORMAL {TITER}
CV B6 NAB TITR SER NT: NORMAL {TITER}

## 2023-10-12 DIAGNOSIS — Z00.00 LABORATORY TESTS ORDERED AS PART OF A COMPLETE PHYSICAL EXAM (CPE): ICD-10-CM

## 2023-10-12 LAB
CHOLEST SERPL-MCNC: 124 MG/DL (ref 0–199)
HDLC SERPL-MCNC: 38 MG/DL (ref 40–60)
LDLC SERPL CALC-MCNC: 45 MG/DL
TRIGL SERPL-MCNC: 207 MG/DL (ref 0–150)
TSH SERPL DL<=0.005 MIU/L-ACNC: 1.24 UIU/ML (ref 0.27–4.2)
VLDLC SERPL CALC-MCNC: 41 MG/DL

## 2023-10-13 ENCOUNTER — OFFICE VISIT (OUTPATIENT)
Dept: FAMILY MEDICINE CLINIC | Age: 48
End: 2023-10-13
Payer: COMMERCIAL

## 2023-10-13 VITALS
HEIGHT: 67 IN | WEIGHT: 293 LBS | SYSTOLIC BLOOD PRESSURE: 122 MMHG | BODY MASS INDEX: 45.99 KG/M2 | RESPIRATION RATE: 16 BRPM | HEART RATE: 89 BPM | DIASTOLIC BLOOD PRESSURE: 70 MMHG | OXYGEN SATURATION: 100 %

## 2023-10-13 DIAGNOSIS — R73.03 PREDIABETES: ICD-10-CM

## 2023-10-13 DIAGNOSIS — E78.00 HYPERCHOLESTEROLEMIA: ICD-10-CM

## 2023-10-13 DIAGNOSIS — R60.0 BILATERAL LOWER EXTREMITY EDEMA: Primary | ICD-10-CM

## 2023-10-13 DIAGNOSIS — E61.1 IRON DEFICIENCY: ICD-10-CM

## 2023-10-13 DIAGNOSIS — Z00.00 LABORATORY TESTS ORDERED AS PART OF A COMPLETE PHYSICAL EXAM (CPE): ICD-10-CM

## 2023-10-13 LAB
EST. AVERAGE GLUCOSE BLD GHB EST-MCNC: 116.9 MG/DL
HBA1C MFR BLD: 5.7 %

## 2023-10-13 PROCEDURE — G8417 CALC BMI ABV UP PARAM F/U: HCPCS | Performed by: INTERNAL MEDICINE

## 2023-10-13 PROCEDURE — 99214 OFFICE O/P EST MOD 30 MIN: CPT | Performed by: INTERNAL MEDICINE

## 2023-10-13 PROCEDURE — G8484 FLU IMMUNIZE NO ADMIN: HCPCS | Performed by: INTERNAL MEDICINE

## 2023-10-13 PROCEDURE — G8427 DOCREV CUR MEDS BY ELIG CLIN: HCPCS | Performed by: INTERNAL MEDICINE

## 2023-10-13 PROCEDURE — 1036F TOBACCO NON-USER: CPT | Performed by: INTERNAL MEDICINE

## 2023-10-13 RX ORDER — FUROSEMIDE 40 MG/1
TABLET ORAL
Qty: 90 TABLET | Refills: 1 | Status: SHIPPED | OUTPATIENT
Start: 2023-10-13

## 2023-10-13 NOTE — PROGRESS NOTES
09/25/2023 9   09/20/2023 11     Creatinine (mg/dL)   Date Value   09/25/2023 0.8   09/20/2023 0.9     Glucose (mg/dL)   Date Value   09/25/2023 110 (H)   09/20/2023 86     Calcium (mg/dL)   Date Value   09/25/2023 8.8   09/20/2023 8.9       Cholesterol, Total (mg/dL)   Date Value   10/12/2023 124     Triglycerides (mg/dL)   Date Value   10/12/2023 207 (H)     HDL (mg/dL)   Date Value   10/12/2023 38 (L)     LDL Calculated (mg/dL)   Date Value   10/12/2023 45       ALT (U/L)   Date Value   09/20/2023 36     AST (U/L)   Date Value   09/20/2023 23       No results found for: \"TSH\", \"T4FREE\", \"T3FREE\"    WBC (K/uL)   Date Value   09/20/2023 7.8     Hemoglobin (g/dL)   Date Value   09/20/2023 13.2     Hematocrit (%)   Date Value   09/20/2023 37.4     MCV (fL)   Date Value   09/20/2023 85.3     Platelets (K/uL)   Date Value   09/20/2023 184       No results found for: \"PSA\"     No results found for: \"LABURIC\"     Vitals:    10/13/23 1025   BP: 122/70   Pulse: 89   Resp: 16   SpO2: 100%       BP Readings from Last 3 Encounters:   10/13/23 122/70   09/26/23 122/78   09/20/23 136/78        Wt Readings from Last 3 Encounters:   10/13/23 295 lb (133.8 kg)   09/26/23 298 lb (135.2 kg)   09/20/23 296 lb 6.4 oz (134.4 kg)        Review of Systems       Objective   Physical Exam  Constitutional:       Appearance: Normal appearance. She is obese. Skin:     Comments: Left foot  blister left heel    Right foot darkish /red spot ? Mole    Neurological:      Mental Status: She is alert.             Darrick Lynn MD

## 2023-10-19 RX ORDER — LEVOCETIRIZINE DIHYDROCHLORIDE 5 MG/1
5 TABLET, FILM COATED ORAL EVERY EVENING
Qty: 90 TABLET | Refills: 3 | Status: SHIPPED | OUTPATIENT
Start: 2023-10-19

## 2023-10-27 ENCOUNTER — OFFICE VISIT (OUTPATIENT)
Dept: BEHAVIORAL/MENTAL HEALTH CLINIC | Age: 48
End: 2023-10-27
Payer: COMMERCIAL

## 2023-10-27 DIAGNOSIS — F90.2 ATTENTION DEFICIT HYPERACTIVITY DISORDER (ADHD), COMBINED TYPE: Primary | ICD-10-CM

## 2023-10-27 PROCEDURE — 90837 PSYTX W PT 60 MINUTES: CPT

## 2023-10-27 PROCEDURE — 1036F TOBACCO NON-USER: CPT

## 2023-10-30 ENCOUNTER — TELEPHONE (OUTPATIENT)
Dept: FAMILY MEDICINE CLINIC | Age: 48
End: 2023-10-30

## 2023-10-30 NOTE — TELEPHONE ENCOUNTER
Pt calling in, She has her visit with Eugenie for ADHD assessment on 10.27.2023. She would like to know if there is any medication Dr. Chung Jamison would prescribe anything from [facility] assessment. Her kids have taken vyvanse in the past that helped them. Would like to stay away from anything to strong that may effect her personality.      [facility] [address]    Please advise  Pt can be reached at [phone]

## 2023-10-30 NOTE — PROGRESS NOTES
Behavioral Health Note  [facility]    Date of Service:  10/27/2023  4:10-5:10pm    Summary for PCP:  Completed Diagnostic Interview for Adult ADHD. Combined Type, moderate to severe. Impacting functioning in career, financial, social, self-esteem, relationship domains. Michelle Maryellen would like to see if medication makes a difference. Presenting Concerns:  Anya Pyle is a 50 y.o. who was referred by her primary care physician, Cabrera Cabrrea MD, due to concerns about ADHD   . Lexi Rico reported symptoms: Trouble focusing, forgetting important details, missed bills, missed tasks at work, feeling overwhelmed, disorganization in environment, zoning out in conversations, trouble in relationships due to forgetfulness, low self-esteem, complaints in childhood and adulthood from family/teachers/supervisor. Symptoms began during late childhood. Previous behavioral health treatment history: None reported. Family psychiatric history: Mother bipolar disorder, both children diagnosed with ADHD.        8/11/2023    12:43 PM   PHQ Scores   PHQ2 Score 2   PHQ9 Score 2   Interpretation of Total Score Depression Severity: 1-4 = Minimal depression, 5-9 = Mild depression, 10-14 = Moderate depression, 15-19 = Moderately severe depression, 20-27 = Severe depression     Mental Status:  Appearance: within normal Limits   Affect:  consistent with expectations based upon mood   Attitude: Cooperative   Mood:   Anxious   Thought Process:  Goal-directed thoughts were interrupted   Delusions: no evidence of delusions   Perceptions: No perceptual disturbance   Behavior:   open   Psychomotor: Within normal limits   Speech:    Within normal limits   Eye Contact: good   Orientation:  oriented to person, place, time, and general circumstances   Judgment & Insight:  normal insight and judgment     Risk Assessment:  Current Suicide Risk:  no suicidal ideation  Current Homicide Risk:  no homicidal

## 2023-11-01 NOTE — TELEPHONE ENCOUNTER
Dr. Alfa Singer would like to discuss the ADHD meds with the patient at an appt. Per Dr. Alfa Singer the patient needs an appt.      Pls call and schedule

## 2023-11-01 NOTE — TELEPHONE ENCOUNTER
Attempted to schedule    Patient was just here on 10/13/2023 and she said she did talk to Dr. Mario Patrick about getting medication for ADHD.  She was advised to have an assessment in which she did on  10/27/2023 by Eugenie Lopez does not feel that it is necessary to come back for another appointment because she already discussed this with Dr. Mario Patrick on 10/13/2023    Please Advise

## 2023-11-17 ENCOUNTER — OFFICE VISIT (OUTPATIENT)
Dept: FAMILY MEDICINE CLINIC | Age: 48
End: 2023-11-17

## 2023-11-17 VITALS
DIASTOLIC BLOOD PRESSURE: 78 MMHG | BODY MASS INDEX: 45.99 KG/M2 | HEIGHT: 67 IN | SYSTOLIC BLOOD PRESSURE: 128 MMHG | HEART RATE: 84 BPM | WEIGHT: 293 LBS | OXYGEN SATURATION: 97 % | RESPIRATION RATE: 16 BRPM

## 2023-11-17 DIAGNOSIS — F90.2 ATTENTION DEFICIT HYPERACTIVITY DISORDER (ADHD), COMBINED TYPE: Primary | ICD-10-CM

## 2023-11-17 DIAGNOSIS — E61.1 IRON DEFICIENCY: ICD-10-CM

## 2023-11-17 DIAGNOSIS — Z02.83 ENCOUNTER FOR DRUG SCREENING: ICD-10-CM

## 2023-11-17 LAB
ALCOHOL URINE: NORMAL
AMPHETAMINE SCREEN, URINE: NORMAL
BARBITURATE SCREEN, URINE: NORMAL
BASOPHILS ABSOLUTE: 0.04 /ΜL
BASOPHILS RELATIVE PERCENT: 0.4 %
BENZODIAZEPINE SCREEN, URINE: NORMAL
BUPRENORPHINE URINE: NORMAL
COCAINE METABOLITE SCREEN URINE: NORMAL
EOSINOPHILS ABSOLUTE: 0.01 /ΜL
EOSINOPHILS RELATIVE PERCENT: 0.1 %
FENTANYL SCREEN, URINE: NORMAL
GABAPENTIN SCREEN, URINE: NORMAL
HCT VFR BLD CALC: 42.3 % (ref 36–46)
HEMOGLOBIN: 14 G/DL (ref 12–16)
LYMPHOCYTES ABSOLUTE: 1.92 /ΜL
LYMPHOCYTES RELATIVE PERCENT: 18.4 %
MCH RBC QN AUTO: 28.4 PG
MCHC RBC AUTO-ENTMCNC: 33.1 G/DL
MCV RBC AUTO: 85.8 FL
MDMA URINE: NORMAL
METHADONE SCREEN, URINE: NORMAL
METHAMPHETAMINE, URINE: NORMAL
MONOCYTES ABSOLUTE: 0.42 /ΜL
MONOCYTES RELATIVE PERCENT: 4 %
NEUTROPHILS ABSOLUTE: 8.05 /ΜL
NEUTROPHILS RELATIVE PERCENT: 77.1 %
OPIATE SCREEN URINE: NORMAL
OXYCODONE SCREEN URINE: NORMAL
PHENCYCLIDINE SCREEN URINE: NORMAL
PLATELET # BLD: 197 K/ΜL
PMV BLD AUTO: NORMAL FL
PROPOXYPHENE SCREEN, URINE: NORMAL
RBC # BLD: 4.93 10^6/ΜL
SYNTHETIC CANNABINOIDS(K2) SCREEN, URINE: NORMAL
THC SCREEN, URINE: NORMAL
TRAMADOL SCREEN URINE: NORMAL
TRICYCLIC ANTIDEPRESSANTS, UR: NORMAL
WBC # BLD: 10.4 10^3/ML

## 2023-11-17 RX ORDER — PREDNISONE 20 MG/1
TABLET ORAL
COMMUNITY
Start: 2023-11-16

## 2023-11-17 RX ORDER — DEXMETHYLPHENIDATE HYDROCHLORIDE 10 MG/1
10 CAPSULE, EXTENDED RELEASE ORAL DAILY
Qty: 30 CAPSULE | Refills: 0 | Status: SHIPPED | OUTPATIENT
Start: 2023-11-17 | End: 2023-12-17

## 2023-11-17 NOTE — PROGRESS NOTES
Jacky Puga (:  1975) is a 50 y.o. female, here for evaluation of the following chief complaint(s):  Congestion (Patient has also had sinus congestion, sore throat, and fatigue that has been going on for a week. She did go to [facility] yesterday. She had a flu, covid, and strep test that was NEGATIVE. She was Rx'd a steroid to help her symptoms ) and ADHD (Patient is here to discuss ADHD meds )           Assessment/Plan  Trinh Bloom was seen today for congestion and adhd. Diagnoses and all orders for this visit:    Attention deficit hyperactivity disorder (ADHD), combined type  Comments:  starting focalin ER 10mg 23 , drug contract, neg urine drug screen  Orders:  -     Dexmethylphenidate HCl ER 10 MG CP24; Take 1 capsule by mouth daily for 30 days. Max Daily Amount: 10 mg    Encounter for drug screening  -     POCT Rapid Drug Screen    Iron deficiency  -     Iron and TIBC; Future  -     Ferritin; Future  -     Hemoglobin and Hematocrit; Future     Will have her back off to iron bid with vit c ( sounds like hematology was not   Re ck labs 2 months  Starting focalin  Follow up one month   OARRS report reviewed; is consistent with prescribed use and free of suspicious activity. Nothing reported. Drug contract done    Follow up 4 weeks  Subjective   SUBJECTIVE/OBJECTIVE:  ADHD      Had eval with Eugenie   Adult ADHD combined type moderate to severe. Her children took vyvanse     Saw dr Cammy Syed today for anemia  Was told to follow up with me . Still on oral iron currently take  one TID  325 dose  Feels better with it  As a child took iron    As an adult quit taking, was not tested  for a while  2 yrs ago started having labs  Seeing dr Cammy Syed since I sent her    Has been on iron off and on since    Had upper and lower scope  Last yr colonoscopy   ok  Upper scope was ok  , acid reflux?   2017     Went to urgent care tested for covid, strep , flu all neg      Hemoglobin A1C (%)

## 2023-11-29 DIAGNOSIS — R60.0 BILATERAL LOWER EXTREMITY EDEMA: ICD-10-CM

## 2023-11-30 RX ORDER — FUROSEMIDE 40 MG/1
TABLET ORAL
Qty: 90 TABLET | Refills: 0 | Status: SHIPPED | OUTPATIENT
Start: 2023-11-30

## 2023-12-15 ENCOUNTER — OFFICE VISIT (OUTPATIENT)
Dept: FAMILY MEDICINE CLINIC | Age: 48
End: 2023-12-15
Payer: COMMERCIAL

## 2023-12-15 VITALS
SYSTOLIC BLOOD PRESSURE: 124 MMHG | RESPIRATION RATE: 16 BRPM | DIASTOLIC BLOOD PRESSURE: 78 MMHG | BODY MASS INDEX: 45.99 KG/M2 | OXYGEN SATURATION: 97 % | HEART RATE: 71 BPM | WEIGHT: 293 LBS | HEIGHT: 67 IN

## 2023-12-15 DIAGNOSIS — Z91.09 ENVIRONMENTAL ALLERGIES: ICD-10-CM

## 2023-12-15 DIAGNOSIS — H92.02 LEFT EAR PAIN: ICD-10-CM

## 2023-12-15 DIAGNOSIS — J45.909 MILD ASTHMA, UNSPECIFIED WHETHER COMPLICATED, UNSPECIFIED WHETHER PERSISTENT: ICD-10-CM

## 2023-12-15 DIAGNOSIS — F90.2 ATTENTION DEFICIT HYPERACTIVITY DISORDER (ADHD), COMBINED TYPE: Primary | ICD-10-CM

## 2023-12-15 PROCEDURE — G8484 FLU IMMUNIZE NO ADMIN: HCPCS | Performed by: INTERNAL MEDICINE

## 2023-12-15 PROCEDURE — G8417 CALC BMI ABV UP PARAM F/U: HCPCS | Performed by: INTERNAL MEDICINE

## 2023-12-15 PROCEDURE — G8427 DOCREV CUR MEDS BY ELIG CLIN: HCPCS | Performed by: INTERNAL MEDICINE

## 2023-12-15 PROCEDURE — 99214 OFFICE O/P EST MOD 30 MIN: CPT | Performed by: INTERNAL MEDICINE

## 2023-12-15 PROCEDURE — 1036F TOBACCO NON-USER: CPT | Performed by: INTERNAL MEDICINE

## 2023-12-15 RX ORDER — FLUTICASONE PROPIONATE 50 MCG
1 SPRAY, SUSPENSION (ML) NASAL DAILY PRN
Qty: 3 EACH | Refills: 3 | Status: SHIPPED | OUTPATIENT
Start: 2023-12-15 | End: 2027-01-19

## 2023-12-15 RX ORDER — DEXMETHYLPHENIDATE HYDROCHLORIDE 15 MG/1
15 CAPSULE, EXTENDED RELEASE ORAL DAILY
Qty: 30 CAPSULE | Refills: 0 | Status: SHIPPED | OUTPATIENT
Start: 2023-12-16 | End: 2024-01-15

## 2023-12-15 RX ORDER — ALBUTEROL SULFATE 90 UG/1
2 AEROSOL, METERED RESPIRATORY (INHALATION) 4 TIMES DAILY PRN
Qty: 18 G | Refills: 0 | Status: SHIPPED | OUTPATIENT
Start: 2023-12-15

## 2024-01-18 DIAGNOSIS — E61.1 IRON DEFICIENCY: ICD-10-CM

## 2024-01-18 DIAGNOSIS — Z00.00 LABORATORY TESTS ORDERED AS PART OF A COMPLETE PHYSICAL EXAM (CPE): ICD-10-CM

## 2024-01-18 LAB
ALBUMIN SERPL-MCNC: 4.7 G/DL (ref 3.4–5)
ALBUMIN/GLOB SERPL: 2.4 {RATIO} (ref 1.1–2.2)
ALP SERPL-CCNC: 67 U/L (ref 40–129)
ALT SERPL-CCNC: 25 U/L (ref 10–40)
ANION GAP SERPL CALCULATED.3IONS-SCNC: 11 MMOL/L (ref 3–16)
AST SERPL-CCNC: 20 U/L (ref 15–37)
BASOPHILS # BLD: 0 K/UL (ref 0–0.2)
BASOPHILS NFR BLD: 0.4 %
BILIRUB SERPL-MCNC: 0.3 MG/DL (ref 0–1)
BUN SERPL-MCNC: 13 MG/DL (ref 7–20)
CALCIUM SERPL-MCNC: 9.3 MG/DL (ref 8.3–10.6)
CHLORIDE SERPL-SCNC: 104 MMOL/L (ref 99–110)
CHOLEST SERPL-MCNC: 112 MG/DL (ref 0–199)
CO2 SERPL-SCNC: 28 MMOL/L (ref 21–32)
CREAT SERPL-MCNC: 0.7 MG/DL (ref 0.6–1.1)
DEPRECATED RDW RBC AUTO: 14 % (ref 12.4–15.4)
EOSINOPHIL # BLD: 0.1 K/UL (ref 0–0.6)
EOSINOPHIL NFR BLD: 0.9 %
FERRITIN SERPL IA-MCNC: 63.4 NG/ML (ref 15–150)
GFR SERPLBLD CREATININE-BSD FMLA CKD-EPI: >60 ML/MIN/{1.73_M2}
GLUCOSE SERPL-MCNC: 107 MG/DL (ref 70–99)
HCT VFR BLD AUTO: 39.3 % (ref 36–48)
HDLC SERPL-MCNC: 31 MG/DL (ref 40–60)
HGB BLD-MCNC: 13.4 G/DL (ref 12–16)
IRON SATN MFR SERPL: 18 % (ref 15–50)
IRON SERPL-MCNC: 70 UG/DL (ref 37–145)
LDLC SERPL CALC-MCNC: 46 MG/DL
LYMPHOCYTES # BLD: 3.8 K/UL (ref 1–5.1)
LYMPHOCYTES NFR BLD: 35 %
MCH RBC QN AUTO: 29.1 PG (ref 26–34)
MCHC RBC AUTO-ENTMCNC: 34 G/DL (ref 31–36)
MCV RBC AUTO: 85.4 FL (ref 80–100)
MONOCYTES # BLD: 0.7 K/UL (ref 0–1.3)
MONOCYTES NFR BLD: 6.8 %
NEUTROPHILS # BLD: 6.1 K/UL (ref 1.7–7.7)
NEUTROPHILS NFR BLD: 56.9 %
PLATELET # BLD AUTO: 228 K/UL (ref 135–450)
PMV BLD AUTO: 8.4 FL (ref 5–10.5)
POTASSIUM SERPL-SCNC: 4.3 MMOL/L (ref 3.5–5.1)
PROT SERPL-MCNC: 6.7 G/DL (ref 6.4–8.2)
RBC # BLD AUTO: 4.6 M/UL (ref 4–5.2)
SODIUM SERPL-SCNC: 143 MMOL/L (ref 136–145)
TIBC SERPL-MCNC: 394 UG/DL (ref 260–445)
TRIGL SERPL-MCNC: 175 MG/DL (ref 0–150)
TSH SERPL DL<=0.005 MIU/L-ACNC: 1.89 UIU/ML (ref 0.27–4.2)
VLDLC SERPL CALC-MCNC: 35 MG/DL
WBC # BLD AUTO: 10.8 K/UL (ref 4–11)

## 2024-01-19 ENCOUNTER — OFFICE VISIT (OUTPATIENT)
Dept: FAMILY MEDICINE CLINIC | Age: 49
End: 2024-01-19
Payer: COMMERCIAL

## 2024-01-19 VITALS
OXYGEN SATURATION: 97 % | SYSTOLIC BLOOD PRESSURE: 126 MMHG | HEART RATE: 72 BPM | RESPIRATION RATE: 16 BRPM | HEIGHT: 67 IN | BODY MASS INDEX: 45.99 KG/M2 | DIASTOLIC BLOOD PRESSURE: 76 MMHG | WEIGHT: 293 LBS

## 2024-01-19 DIAGNOSIS — B35.1 TOENAIL FUNGUS: ICD-10-CM

## 2024-01-19 DIAGNOSIS — Z86.39 HISTORY OF IRON DEFICIENCY: ICD-10-CM

## 2024-01-19 DIAGNOSIS — F90.2 ATTENTION DEFICIT HYPERACTIVITY DISORDER (ADHD), COMBINED TYPE: ICD-10-CM

## 2024-01-19 DIAGNOSIS — Z00.00 PE (PHYSICAL EXAM), ANNUAL: Primary | ICD-10-CM

## 2024-01-19 LAB
EST. AVERAGE GLUCOSE BLD GHB EST-MCNC: 119.8 MG/DL
HBA1C MFR BLD: 5.8 %

## 2024-01-19 PROCEDURE — G8484 FLU IMMUNIZE NO ADMIN: HCPCS | Performed by: INTERNAL MEDICINE

## 2024-01-19 PROCEDURE — 99396 PREV VISIT EST AGE 40-64: CPT | Performed by: INTERNAL MEDICINE

## 2024-01-19 RX ORDER — DEXMETHYLPHENIDATE HYDROCHLORIDE 15 MG/1
15 CAPSULE, EXTENDED RELEASE ORAL DAILY
Qty: 30 CAPSULE | Refills: 0 | Status: SHIPPED | OUTPATIENT
Start: 2024-01-19 | End: 2024-02-18

## 2024-01-19 ASSESSMENT — ENCOUNTER SYMPTOMS
COLOR CHANGE: 0
EYE PAIN: 0
VOMITING: 0
CONSTIPATION: 1
WHEEZING: 0
NAUSEA: 1
DIARRHEA: 1
SHORTNESS OF BREATH: 0

## 2024-01-19 ASSESSMENT — PATIENT HEALTH QUESTIONNAIRE - PHQ9
2. FEELING DOWN, DEPRESSED OR HOPELESS: 1
SUM OF ALL RESPONSES TO PHQ9 QUESTIONS 1 & 2: 2
SUM OF ALL RESPONSES TO PHQ QUESTIONS 1-9: 2
1. LITTLE INTEREST OR PLEASURE IN DOING THINGS: 1
SUM OF ALL RESPONSES TO PHQ QUESTIONS 1-9: 2

## 2024-01-19 NOTE — PROGRESS NOTES
Topic Date Due    Hepatitis B vaccine (1 of 3 - 3-dose series) Never done    HIV screen  Never done    Hepatitis C screen  Never done    Cervical cancer screen  Never done    Flu vaccine (1) 08/01/2023    COVID-19 Vaccine (2 - 2023-24 season) 09/01/2023    Depression Screen  08/11/2024    A1C test (Diabetic or Prediabetic)  01/18/2025    Lipids  01/18/2025    Colorectal Cancer Screen  09/22/2027    DTaP/Tdap/Td vaccine (3 - Td or Tdap) 12/31/2032    Hepatitis A vaccine  Aged Out    Hib vaccine  Aged Out    Polio vaccine  Aged Out    Meningococcal (ACWY) vaccine  Aged Out    Pneumococcal 0-64 years Vaccine  Aged Out    Diabetes screen  Discontinued     Recommendations for Preventive Services Due: see orders and patient instructions/AVS.

## 2024-01-20 ENCOUNTER — PATIENT MESSAGE (OUTPATIENT)
Dept: FAMILY MEDICINE CLINIC | Age: 49
End: 2024-01-20

## 2024-01-20 DIAGNOSIS — F90.2 ATTENTION DEFICIT HYPERACTIVITY DISORDER (ADHD), COMBINED TYPE: Primary | ICD-10-CM

## 2024-01-22 NOTE — TELEPHONE ENCOUNTER
Spoke to the patient. Her pharmacy is having a hard time getting the Focalin in. The patient feels like she is getting side effects from the Focalin. (She feels easily agitated and depressed). She would like to know if Dr. Nava can send in vyvanse?     The patient had a recent CPE 1/19/24 with Dr. Nava.     Advised the patient that she might need to schedule a VV to discuss switching the meds.     Pls advise if OK to switch the meds?

## 2024-01-23 DIAGNOSIS — F90.2 ATTENTION DEFICIT HYPERACTIVITY DISORDER (ADHD), COMBINED TYPE: Primary | ICD-10-CM

## 2024-01-23 RX ORDER — LISDEXAMFETAMINE DIMESYLATE CAPSULES 30 MG/1
30 CAPSULE ORAL DAILY
Qty: 30 CAPSULE | Refills: 0 | Status: SHIPPED | OUTPATIENT
Start: 2024-01-23 | End: 2024-02-22

## 2024-01-24 NOTE — TELEPHONE ENCOUNTER
Pharm told pt that they do not have vyvanse   They only have the name brand for the vyvanse  They do not have 30mg but they have the 40mg  Pt is needing this med sent to her pharm  She is going to call her insurance to find out it is covered   Pt does not care what the dosage is- she just needs something called in    [facility] [address]     Please call pt once med has been sent over [phone]

## 2024-01-25 RX ORDER — LISDEXAMFETAMINE DIMESYLATE CAPSULES 30 MG/1
30 CAPSULE ORAL DAILY
Qty: 30 CAPSULE | Refills: 0 | Status: SHIPPED | OUTPATIENT
Start: 2024-01-25 | End: 2024-02-24

## 2024-02-23 ENCOUNTER — OFFICE VISIT (OUTPATIENT)
Dept: FAMILY MEDICINE CLINIC | Age: 49
End: 2024-02-23
Payer: COMMERCIAL

## 2024-02-23 VITALS
BODY MASS INDEX: 45.99 KG/M2 | HEIGHT: 67 IN | OXYGEN SATURATION: 97 % | HEART RATE: 75 BPM | WEIGHT: 293 LBS | SYSTOLIC BLOOD PRESSURE: 128 MMHG | RESPIRATION RATE: 16 BRPM | DIASTOLIC BLOOD PRESSURE: 78 MMHG

## 2024-02-23 DIAGNOSIS — Z78.9 EDUCATED ABOUT MANAGEMENT OF WEIGHT: ICD-10-CM

## 2024-02-23 DIAGNOSIS — F90.2 ATTENTION DEFICIT HYPERACTIVITY DISORDER (ADHD), COMBINED TYPE: Primary | ICD-10-CM

## 2024-02-23 PROCEDURE — G8417 CALC BMI ABV UP PARAM F/U: HCPCS | Performed by: INTERNAL MEDICINE

## 2024-02-23 PROCEDURE — G8427 DOCREV CUR MEDS BY ELIG CLIN: HCPCS | Performed by: INTERNAL MEDICINE

## 2024-02-23 PROCEDURE — 99213 OFFICE O/P EST LOW 20 MIN: CPT | Performed by: INTERNAL MEDICINE

## 2024-02-23 PROCEDURE — 1036F TOBACCO NON-USER: CPT | Performed by: INTERNAL MEDICINE

## 2024-02-23 PROCEDURE — G8484 FLU IMMUNIZE NO ADMIN: HCPCS | Performed by: INTERNAL MEDICINE

## 2024-02-23 RX ORDER — LISDEXAMFETAMINE DIMESYLATE CAPSULES 40 MG/1
40 CAPSULE ORAL DAILY
Qty: 30 CAPSULE | Refills: 0 | Status: SHIPPED | OUTPATIENT
Start: 2024-02-23 | End: 2024-03-24

## 2024-02-23 RX ORDER — LISDEXAMFETAMINE DIMESYLATE CAPSULES 30 MG/1
30 CAPSULE ORAL DAILY
Qty: 30 CAPSULE | Refills: 0 | Status: CANCELLED | OUTPATIENT
Start: 2024-02-23 | End: 2024-03-24

## 2024-02-23 NOTE — PROGRESS NOTES
Garima Taylor (:  1975) is a 48 y.o. female, here for evaluation of the following chief complaint(s):  Medication Check (Patient is here for an adhd med follow up )           Assessment/Plan  Garima was seen today for medication check.    Diagnoses and all orders for this visit:    Attention deficit hyperactivity disorder (ADHD), combined type  -     Lisdexamfetamine Dimesylate 40 MG CAPS; Take 40 mg by mouth daily for 30 days. Max Daily Amount: 40 mg    BMI 45.0-49.9, adult (HCC)  -     [employer] ([employer] [facility]    Educated about management of weight       Will inc vyvanse 40mg/day  [employer]  Used to exercise a lot    See me  3m        Subjective   SUBJECTIVE/OBJECTIVE:  HPI  Saw GI     Getting tested for celiac  Getting egd in April    Constipation /diarrhea  On saxenda    Was super athletic until having kids  Running , swimming and lifting wt  Very skinny    Adhd  Vyvanse working   Not depressed  Focalin made it worse    Wt is an issue  Trying to eat well  Discussed exercise  160 was her more ideal wt .  Trying  180-200  Too thin at 160  Generally eats 2 meals /day      Hemoglobin A1C (%)   Date Value   2024 5.8   10/12/2023 5.7       Sodium (mmol/L)   Date Value   2024 143   2023 140   2023 146 (H)     Potassium (mmol/L)   Date Value   2024 4.3   2023 4.2   2023 3.3 (L)     Chloride (mmol/L)   Date Value   2024 104   2023 104   2023 103     CO2 (mmol/L)   Date Value   2024 28   2023 24   2023 25     BUN (mg/dL)   Date Value   2024 13   2023 9   2023 11     Creatinine (mg/dL)   Date Value   2024 0.7   2023 0.8   2023 0.9     Glucose (mg/dL)   Date Value   2024 107 (H)   2023 110 (H)   2023 86     Calcium (mg/dL)   Date Value   2024 9.3   2023 8.8   2023 8.9       Cholesterol, Total (mg/dL)   Date Value

## 2024-02-26 RX ORDER — ROSUVASTATIN CALCIUM 10 MG/1
10 TABLET, COATED ORAL DAILY
Qty: 90 TABLET | Refills: 1 | Status: SHIPPED | OUTPATIENT
Start: 2024-02-26

## 2024-02-26 RX ORDER — PANTOPRAZOLE SODIUM 40 MG/1
40 TABLET, DELAYED RELEASE ORAL DAILY
Qty: 90 TABLET | Refills: 1 | Status: SHIPPED | OUTPATIENT
Start: 2024-02-26

## 2024-03-11 NOTE — PROGRESS NOTES
[facility] Pre-Admission Testing Electronic Communication Worksheet for OR/ENDO Procedures        Patient: Garima Taylor    DOS: 4/4     Surgery Time:1000    Transportation Confirmed: [x] YES    []  NO    History and Physical:  [] YES    []  NO  [x] N/A  If yes, please list doctor or Urgent Care and date of H&P:     Additional Clearance(Cardiac, Pulmonary, etc):  [] YES    [x]  NO    Pre-Admission Testing Visit:  [] YES    [x]  NO If no, do labs/testing need to be done DOS?  [] YES    [x]  NO    Medication Reconciliation Complete:  [x] YES    []  NO        Additional Notes:  Sleep apnea-uses cpap      Interview Complete: [x] YES    []  NO          Jodee Henry, RN  2:30 PM

## 2024-03-11 NOTE — PROGRESS NOTES
[facility] PRE-OPERATIVE INSTRUCTIONS    Day of Procedure:4/4                Arrival time:  0830              Surgery time:1000    Take the following medications with a sip of water:  Follow your MD/Surgeons pre-procedure instructions regarding your medications     Do not eat or drink anything after 12:00 midnight prior to your surgery.  This includes water chewing gum, mints and ice chips.   You may brush your teeth and gargle the morning of your surgery, but do not swallow the water     Please see your family doctor/pediatrician for a history and physical and/or concerning medications.   Bring any test results/reports from your physicians office.   If you are under the care of a heart doctor or specialist doctor, please be aware that you may be asked to them for clearance    You may be asked to stop blood thinners such as Coumadin, Plavix, Fragmin, Lovenox, etc., or any anti-inflammatories such as:  Aspirin, Ibuprofen, Advil, Naproxen prior to your surgery.    We also ask that you stop any OTC medications such as fish oil, vitamin E, glucosamine, garlic, Multivitamins, COQ 10, etc.    We ask that you do not smoke 24 hours prior to surgery  We ask that you do not  drink any alcoholic beverages 24 hours prior to surgery     You must make arrangements for a responsible adult to take you home after your surgery.    For your safety you will not be allowed to leave alone or drive yourself home.  Your surgery will be cancelled if you do not have a ride home.     Also for your safety, it is strongly suggested that someone stay with you the first 24 hours after your surgery.     A parent or legal guardian must accompany a child scheduled for surgery and plan to stay at the hospital until the child is discharged.    Please do not bring other children with you.    For your comfort, please wear simple loose fitting clothing to the hospital.  Please do not bring valuables.    Do not wear any make-up or nail polish

## 2024-03-25 DIAGNOSIS — F90.2 ATTENTION DEFICIT HYPERACTIVITY DISORDER (ADHD), COMBINED TYPE: ICD-10-CM

## 2024-03-25 RX ORDER — LISDEXAMFETAMINE DIMESYLATE CAPSULES 40 MG/1
40 CAPSULE ORAL DAILY
Qty: 30 CAPSULE | Refills: 0 | Status: SHIPPED | OUTPATIENT
Start: 2024-03-25 | End: 2024-04-24

## 2024-04-03 ENCOUNTER — ANESTHESIA EVENT (OUTPATIENT)
Dept: ENDOSCOPY | Age: 49
End: 2024-04-03
Payer: COMMERCIAL

## 2024-04-04 ENCOUNTER — HOSPITAL ENCOUNTER (OUTPATIENT)
Age: 49
Setting detail: OUTPATIENT SURGERY
Discharge: HOME | End: 2024-04-04
Attending: INTERNAL MEDICINE | Admitting: INTERNAL MEDICINE
Payer: COMMERCIAL

## 2024-04-04 ENCOUNTER — ANESTHESIA (OUTPATIENT)
Dept: ENDOSCOPY | Age: 49
End: 2024-04-04
Payer: COMMERCIAL

## 2024-04-04 VITALS
HEIGHT: 66 IN | BODY MASS INDEX: 47.09 KG/M2 | OXYGEN SATURATION: 99 % | SYSTOLIC BLOOD PRESSURE: 138 MMHG | RESPIRATION RATE: 16 BRPM | WEIGHT: 293 LBS | HEART RATE: 67 BPM | DIASTOLIC BLOOD PRESSURE: 84 MMHG | TEMPERATURE: 97.8 F

## 2024-04-04 DIAGNOSIS — D50.0 IRON DEFICIENCY ANEMIA SECONDARY TO BLOOD LOSS (CHRONIC): ICD-10-CM

## 2024-04-04 LAB — HCG UR QL: NEGATIVE

## 2024-04-04 PROCEDURE — 2580000003 HC RX 258: Performed by: ANESTHESIOLOGY

## 2024-04-04 PROCEDURE — 7100000011 HC PHASE II RECOVERY - ADDTL 15 MIN: Performed by: INTERNAL MEDICINE

## 2024-04-04 PROCEDURE — 3700000001 HC ADD 15 MINUTES (ANESTHESIA): Performed by: INTERNAL MEDICINE

## 2024-04-04 PROCEDURE — 3700000000 HC ANESTHESIA ATTENDED CARE: Performed by: INTERNAL MEDICINE

## 2024-04-04 PROCEDURE — 3609012400 HC EGD TRANSORAL BIOPSY SINGLE/MULTIPLE: Performed by: INTERNAL MEDICINE

## 2024-04-04 PROCEDURE — 7100000010 HC PHASE II RECOVERY - FIRST 15 MIN: Performed by: INTERNAL MEDICINE

## 2024-04-04 PROCEDURE — 2500000003 HC RX 250 WO HCPCS

## 2024-04-04 PROCEDURE — 88305 TISSUE EXAM BY PATHOLOGIST: CPT

## 2024-04-04 PROCEDURE — 2709999900 HC NON-CHARGEABLE SUPPLY: Performed by: INTERNAL MEDICINE

## 2024-04-04 PROCEDURE — 7100000000 HC PACU RECOVERY - FIRST 15 MIN: Performed by: INTERNAL MEDICINE

## 2024-04-04 PROCEDURE — 84703 CHORIONIC GONADOTROPIN ASSAY: CPT

## 2024-04-04 PROCEDURE — 7100000001 HC PACU RECOVERY - ADDTL 15 MIN: Performed by: INTERNAL MEDICINE

## 2024-04-04 PROCEDURE — 6360000002 HC RX W HCPCS

## 2024-04-04 RX ORDER — GLYCOPYRROLATE 0.2 MG/ML
INJECTION INTRAMUSCULAR; INTRAVENOUS PRN
Status: DISCONTINUED | OUTPATIENT
Start: 2024-04-04 | End: 2024-04-04 | Stop reason: SDUPTHER

## 2024-04-04 RX ORDER — SODIUM CHLORIDE 0.9 % (FLUSH) 0.9 %
5-40 SYRINGE (ML) INJECTION PRN
Status: DISCONTINUED | OUTPATIENT
Start: 2024-04-04 | End: 2024-04-04 | Stop reason: HOSPADM

## 2024-04-04 RX ORDER — SODIUM CHLORIDE 0.9 % (FLUSH) 0.9 %
5-40 SYRINGE (ML) INJECTION EVERY 12 HOURS SCHEDULED
Status: DISCONTINUED | OUTPATIENT
Start: 2024-04-04 | End: 2024-04-04 | Stop reason: HOSPADM

## 2024-04-04 RX ORDER — FENTANYL CITRATE 0.05 MG/ML
25 INJECTION, SOLUTION INTRAMUSCULAR; INTRAVENOUS EVERY 5 MIN PRN
Status: DISCONTINUED | OUTPATIENT
Start: 2024-04-04 | End: 2024-04-04 | Stop reason: HOSPADM

## 2024-04-04 RX ORDER — SODIUM CHLORIDE 9 MG/ML
INJECTION, SOLUTION INTRAVENOUS PRN
Status: DISCONTINUED | OUTPATIENT
Start: 2024-04-04 | End: 2024-04-04 | Stop reason: HOSPADM

## 2024-04-04 RX ORDER — MEPERIDINE HYDROCHLORIDE 25 MG/ML
12.5 INJECTION INTRAMUSCULAR; INTRAVENOUS; SUBCUTANEOUS EVERY 5 MIN PRN
Status: DISCONTINUED | OUTPATIENT
Start: 2024-04-04 | End: 2024-04-04 | Stop reason: HOSPADM

## 2024-04-04 RX ORDER — LIDOCAINE HYDROCHLORIDE 20 MG/ML
INJECTION, SOLUTION EPIDURAL; INFILTRATION; INTRACAUDAL; PERINEURAL PRN
Status: DISCONTINUED | OUTPATIENT
Start: 2024-04-04 | End: 2024-04-04 | Stop reason: SDUPTHER

## 2024-04-04 RX ORDER — ONDANSETRON 2 MG/ML
4 INJECTION INTRAMUSCULAR; INTRAVENOUS
Status: DISCONTINUED | OUTPATIENT
Start: 2024-04-04 | End: 2024-04-04 | Stop reason: HOSPADM

## 2024-04-04 RX ORDER — FENTANYL CITRATE 0.05 MG/ML
50 INJECTION, SOLUTION INTRAMUSCULAR; INTRAVENOUS EVERY 5 MIN PRN
Status: DISCONTINUED | OUTPATIENT
Start: 2024-04-04 | End: 2024-04-04 | Stop reason: HOSPADM

## 2024-04-04 RX ORDER — PROPOFOL 10 MG/ML
INJECTION, EMULSION INTRAVENOUS PRN
Status: DISCONTINUED | OUTPATIENT
Start: 2024-04-04 | End: 2024-04-04 | Stop reason: SDUPTHER

## 2024-04-04 RX ORDER — NALOXONE HYDROCHLORIDE 0.4 MG/ML
INJECTION, SOLUTION INTRAMUSCULAR; INTRAVENOUS; SUBCUTANEOUS PRN
Status: DISCONTINUED | OUTPATIENT
Start: 2024-04-04 | End: 2024-04-04 | Stop reason: HOSPADM

## 2024-04-04 RX ADMIN — LIDOCAINE HYDROCHLORIDE 100 MG: 20 INJECTION, SOLUTION EPIDURAL; INFILTRATION; INTRACAUDAL; PERINEURAL at 10:37

## 2024-04-04 RX ADMIN — PROPOFOL 100 MG: 10 INJECTION, EMULSION INTRAVENOUS at 10:37

## 2024-04-04 RX ADMIN — PROPOFOL 30 MG: 10 INJECTION, EMULSION INTRAVENOUS at 10:39

## 2024-04-04 RX ADMIN — GLYCOPYRROLATE 0.2 MG: 0.2 INJECTION INTRAMUSCULAR; INTRAVENOUS at 10:37

## 2024-04-04 RX ADMIN — SODIUM CHLORIDE: 9 INJECTION, SOLUTION INTRAVENOUS at 10:33

## 2024-04-04 RX ADMIN — PROPOFOL 200 MCG/KG/MIN: 10 INJECTION, EMULSION INTRAVENOUS at 10:38

## 2024-04-04 ASSESSMENT — ENCOUNTER SYMPTOMS: SHORTNESS OF BREATH: 0

## 2024-04-04 ASSESSMENT — PAIN - FUNCTIONAL ASSESSMENT
PAIN_FUNCTIONAL_ASSESSMENT: NONE - DENIES PAIN
PAIN_FUNCTIONAL_ASSESSMENT: NONE - DENIES PAIN

## 2024-04-04 NOTE — ANESTHESIA POSTPROCEDURE EVALUATION
Department of Anesthesiology  Postprocedure Note    Patient: Garima Taylor  MRN: [id number]  YOB: 1975  Date of evaluation: 4/4/2024    Procedure Summary       Date: 04/04/24 Room / Location: [place] [address] / [facility]    Anesthesia Start: 1033 Anesthesia Stop: 1050    Procedure: ESOPHAGOGASTRODUODENOSCOPY BIOPSY Diagnosis:       Iron deficiency anemia secondary to blood loss (chronic)      (Iron deficiency anemia secondary to blood loss (chronic) [D50.0])    Surgeons: Juani Hook MD Responsible Provider: Mauri Ryan MD    Anesthesia Type: MAC ASA Status: 3            Anesthesia Type: No value filed.    Weston Phase I: Weston Score: 10    Weston Phase II: Weston Score: 10    Anesthesia Post Evaluation    Patient location during evaluation: PACU  Patient participation: complete - patient participated  Level of consciousness: awake and alert  Pain score: 0  Airway patency: patent  Nausea & Vomiting: no nausea and no vomiting  Cardiovascular status: blood pressure returned to baseline  Respiratory status: acceptable  Hydration status: euvolemic  Pain management: adequate    No notable events documented.

## 2024-04-04 NOTE — PROGRESS NOTES
Patient resting comfortably, opens eyes to name. Resp easy unlabored on room air O2 with SaO2 98%. Abdomen rounded soft. VSS. IV patent. Patient denies C/O pain or nausea. Patient stable to transfer to ACU for phase II.

## 2024-04-04 NOTE — DISCHARGE INSTRUCTIONS
You had some mild irritation and a small erosion in the stomach, and had multiple polyps in the stomach. All of these are benign, but we did take biopsies to make sure.     Await biopsy results   Monitor iron studies with PCP   If iron deficiency persists despite treatment, would consider repeat colonoscopy and capsule endoscopy   The patient had biopsies taken today.  The patient should check patient portal for results in 7 days and call us if they have not heard from our office within 14 days.  Our number is [phone].        Endoscopy Discharge Instructions      You may be drowsy or lightheaded after receiving sedation.  DO NOT operate  a vehicle (automobile, bicycle, motorcycle, machinery, or power tools), no  alcoholic beverages, and do not make any important decisions today.                 Plan on bed rest or quiet relaxation today.  Resume normal activities in the morning.     Resume normal activity tomorrow unless otherwise advised by your physician.            Eat a light first meal, avoiding spicy and fatty foods, then resume normal diet unless  you are told otherwise by your physician.    If the intravenous medication site is painful, apply warm compresses on the site until the soreness is relieved and elevate the arm above the heart. Call your physician if no improvement  in 2-3 days.       POSSIBLE SYMPTOMS TO WATCH:     1. fever (greater than 100) 5. increased abdominal bloating   2. severe pain   6. excessive bleeding   3. nausea and vomiting  7. chest pain   4. chills    8. shortness of breath       Expected as normal and remedies:  Sore throat: use over the counter throat lozenges or gargle with warm salt water.  Redness or soreness at the IV site: apply warm compress  Gaseous discomfort: belching or passing flatus (gas).

## 2024-04-04 NOTE — OP NOTE
Endoscopy Note    Patient: Garima Taylor  : 1975  Acct#:     Procedure: Esophagogastroduodenoscopy with biopsy                         Date:  2024     Surgeon:   Juani Hook MD    Referring Physician:  Rimma Nava MD    Indications: This is a 48 y.o. year old female who presents today with iron deficiency anemia .      Postoperative Diagnosis:     The stomach was notable for a single erosion in the proximal gastric body and mild gastritis in the gastric body/incisura.   There were numerous fundic gland polyps noted in the mid gastric body and fundus, measuring from 2mm to 10 mm, and these were sampled   The gastric mucosa was biopsied to rule out H pylori.     Anesthesia:  Administered by the anesthesia service during MAC     Consent:  The patient or their legal guardian has signed an informed consent, and is aware of the potential risks, benefits, alternatives, and potential complications of this procedure.  These include, but are not limited to hemorrhage, bleeding, post procedural pain, perforation, phlebitis, aspiration, hypotension, hypoxia, cardiovascular events such as arryhthmia, and possibly death.     Description of Procedure: The patient was then taken to the endoscopy suite, placed in the left lateral decubitus position and the above IV sedation was administrered.    The Olympus video endoscope was placed through the patient's oropharynx without difficulty to the extent of the 2nd portion of the duodenum.  Both forward and retroflexed views of the stomach were obtained.      Findings:    Esophagus: The esophagus appeared normal without evidence of Short's esophagus or reflux esophagitis.   The Z line was located at 38 cm, the GE junction at 38 cm, and the hiatus at 38 cm.     Stomach: The stomach was notable for a single erosion in the proximal gastric body and mild gastritis in the gastric body/incisura.   There were numerous fundic gland polyps noted in the mid  gastric body and fundus, measuring from 2mm to 10 mm, and these were sampled   The gastric mucosa was biopsied to rule out H pylori.     Duodenum: The first and 2nd portions of the duodenum appeared normal with normal villous pattern  This was biopsied to rule out sprue.     The scope was then withdrawn back into the stomach, it was decompressed, and the scope was completely withdrawn.    The patient tolerated the procedure well and was taken to the post anesthesia care unit in good condition.    Estimated blood loss: None    ID Type Source Tests Collected by Time Destination   A : Small Bowel Biopsies Tissue Duodenum SURGICAL PATHOLOGY Juani Hook MD 4/4/2024 1037    B : Gastric Biopsies Gastric Gastric SURGICAL PATHOLOGY Juani Hook MD 4/4/2024 1038    C : Gastric Polyps Biopsies Gastric Gastric SURGICAL PATHOLOGY Juani Hook MD 4/4/2024 1038      Impression:   1) See post procedure diagnoses    Recommendations:   Await biopsy results   Monitor iron studies with PCP   If iron deficiency persists despite treatment, would consider repeat colonoscopy and capsule endoscopy   The patient had biopsies taken today.  The patient should check patient portal for results in 7 days and call us if they have not heard from our office within 14 days.  Our number is [phone].      Juani Hook MD  [facility]  [phone]

## 2024-04-04 NOTE — PROGRESS NOTES
Patient admitted to PACU from OR. Patient opens eyes to name, drowsy. Resp easy unlabored on room air O2 with SaO2 96%. Abdomen rounded soft. VSS. IV patent to right forearm.

## 2024-04-04 NOTE — ANESTHESIA PRE PROCEDURE
Vitals:    03/11/24 1422 04/04/24 0821 04/04/24 0828   BP:   133/72   Pulse:   75   Resp:   18   Temp:   97.3 °F (36.3 °C)   TempSrc:   Temporal   SpO2:   97%   Weight: 133.4 kg (294 lb) 132.9 kg (293 lb)    Height: 1.676 m (5' 6\") 1.676 m (5' 6\")                                               BP Readings from Last 3 Encounters:   04/04/24 133/72   02/23/24 128/78   01/19/24 126/76       NPO Status: Time of last liquid consumption: 2300                        Time of last solid consumption: 2300                        Date of last liquid consumption: 04/03/24                        Date of last solid food consumption: 04/03/24    BMI:   Wt Readings from Last 3 Encounters:   04/04/24 132.9 kg (293 lb)   02/23/24 134.3 kg (296 lb)   01/19/24 133.8 kg (295 lb)     Body mass index is 47.29 kg/m².    CBC:   Lab Results   Component Value Date/Time    WBC 10.8 01/18/2024 08:29 AM    RBC 4.60 01/18/2024 08:29 AM    HGB 13.4 01/18/2024 08:29 AM    HCT 39.3 01/18/2024 08:29 AM    MCV 85.4 01/18/2024 08:29 AM    RDW 14.0 01/18/2024 08:29 AM     01/18/2024 08:29 AM       CMP:   Lab Results   Component Value Date/Time     01/18/2024 08:29 AM    K 4.3 01/18/2024 08:29 AM     01/18/2024 08:29 AM    CO2 28 01/18/2024 08:29 AM    BUN 13 01/18/2024 08:29 AM    CREATININE 0.7 01/18/2024 08:29 AM    AGRATIO 2.4 01/18/2024 08:29 AM    LABGLOM >60 01/18/2024 08:29 AM    GLUCOSE 107 01/18/2024 08:29 AM    PROT 6.7 01/18/2024 08:29 AM    CALCIUM 9.3 01/18/2024 08:29 AM    BILITOT 0.3 01/18/2024 08:29 AM    ALKPHOS 67 01/18/2024 08:29 AM    AST 20 01/18/2024 08:29 AM    ALT 25 01/18/2024 08:29 AM       POC Tests: No results for input(s): \"POCGLU\", \"POCNA\", \"POCK\", \"POCCL\", \"POCBUN\", \"POCHEMO\", \"POCHCT\" in the last 72 hours.    Coags: No results found for: \"PROTIME\", \"INR\", \"APTT\"    HCG (If Applicable):   Lab Results   Component Value Date    PREGTESTUR Negative 04/04/2024        ABGs: No results found for: \"PHART\",

## 2024-04-04 NOTE — PROGRESS NOTES
Pt c/o red spots on hands, denies itching or any other symptoms. Very faint hives noted. No other areas of body affected. Call placed to Dr. Ryan. Okay to d/c, probable topical reaction. Instructed to monitor for further symptoms. If worsen, call MD or return to ER. Verbalized understanding.

## 2024-04-04 NOTE — H&P
Pre-operative History and Physical    Patient: Garima Taylor  : 1975  Acct#:     Intended Procedure:  EGD     HISTORY OF PRESENT ILLNESS:  The patient is a 48 y.o. female  who presents for/due to iron deficiency       Past Medical History:        Diagnosis Date    Anemia     Asthma     Hypertension     PCOS (polycystic ovarian syndrome)     Sleep apnea     uses cpap     Past Surgical History:        Procedure Laterality Date     SECTION      x3    DILATION AND CURETTAGE OF UTERUS      ENDOMETRIAL ABLATION       Medications Prior to Admission:   No current facility-administered medications on file prior to encounter.     Current Outpatient Medications on File Prior to Encounter   Medication Sig Dispense Refill    fluticasone (FLONASE) 50 MCG/ACT nasal spray 1 spray by Nasal route daily as needed for Rhinitis 3 each 3    albuterol sulfate HFA (VENTOLIN HFA) 108 (90 Base) MCG/ACT inhaler Inhale 2 puffs into the lungs 4 times daily as needed for Wheezing 18 g 0    furosemide (LASIX) 40 MG tablet TAKE 1 TABLET BY MOUTH EVERY DAY AS NEEDED 90 tablet 0    levocetirizine (XYZAL) 5 MG tablet TAKE 1 TABLET BY MOUTH EVERY DAY IN THE EVENING 90 tablet 3    SAXENDA 18 MG/3ML SOPN       metFORMIN (GLUCOPHAGE) 1000 MG tablet Take 1 tablet by mouth 2 times daily      Ferrous Sulfate 324 MG TBEC Take 1 tablet by mouth 2 times daily [facility] 10/23      ketorolac (ACULAR) 0.5 % ophthalmic solution Apply 1 drop to eye 4 times daily      ondansetron (ZOFRAN-ODT) 4 MG disintegrating tablet Take 1 tablet by mouth every 6 hours as needed      methocarbamol (ROBAXIN) 750 MG tablet Take 1 tablet by mouth 4 times daily as needed      butalbital-acetaminophen-caffeine (FIORICET, ESGIC) -40 MG per tablet TAKE 1 TABLET BY MOUTH THREE TIMES A DAY AS NEEDED      spironolactone (ALDACTONE) 25 MG tablet       metoprolol tartrate (LOPRESSOR) 50 MG tablet TAKE 1 TABLET BY MOUTH TWICE A DAY  3    Multiple Vitamin

## 2024-04-04 NOTE — PROGRESS NOTES
Pt received into room 8 from PACU. Pt awake, alert, and oriented. Room air. Vss. Pt stable. Denies pain.

## 2024-04-25 ENCOUNTER — PATIENT MESSAGE (OUTPATIENT)
Dept: FAMILY MEDICINE CLINIC | Age: 49
End: 2024-04-25

## 2024-04-25 DIAGNOSIS — F90.2 ATTENTION DEFICIT HYPERACTIVITY DISORDER (ADHD), COMBINED TYPE: ICD-10-CM

## 2024-04-25 RX ORDER — LISDEXAMFETAMINE DIMESYLATE 40 MG/1
40 CAPSULE ORAL DAILY
Qty: 30 CAPSULE | Refills: 0 | Status: CANCELLED | OUTPATIENT
Start: 2024-04-25 | End: 2024-05-25

## 2024-04-26 RX ORDER — LISDEXAMFETAMINE DIMESYLATE CAPSULES 40 MG/1
40 CAPSULE ORAL DAILY
Qty: 30 CAPSULE | Refills: 0 | Status: SHIPPED | OUTPATIENT
Start: 2024-04-26 | End: 2024-05-26

## 2024-04-26 NOTE — TELEPHONE ENCOUNTER
From: Garima Taylor  To: Dr. Rimma Nava  Sent: 4/25/2024 9:14 PM EDT  Subject: Vyvanse     Hi, I have seven days left of the vyvanse, I just wanted to request it while I was filling my meds for the week. I will need it next Thursday     Thanks

## 2024-05-14 ENCOUNTER — TELEMEDICINE (OUTPATIENT)
Dept: FAMILY MEDICINE CLINIC | Age: 49
End: 2024-05-14
Payer: COMMERCIAL

## 2024-05-14 DIAGNOSIS — L23.7 ALLERGIC DERMATITIS DUE TO POISON OAK: Primary | ICD-10-CM

## 2024-05-14 PROCEDURE — G8427 DOCREV CUR MEDS BY ELIG CLIN: HCPCS | Performed by: INTERNAL MEDICINE

## 2024-05-14 PROCEDURE — 99213 OFFICE O/P EST LOW 20 MIN: CPT | Performed by: INTERNAL MEDICINE

## 2024-05-14 RX ORDER — PREDNISONE 20 MG/1
TABLET ORAL
Qty: 13 TABLET | Refills: 0 | Status: SHIPPED | OUTPATIENT
Start: 2024-05-14

## 2024-05-14 NOTE — PROGRESS NOTES
Garima Taylor (:  1975) is here for evaluation of the following:    Assessment/Plan  Garima was seen today for rash.    Diagnoses and all orders for this visit:    Allergic dermatitis due to poison oak  -     predniSONE (DELTASONE) 20 MG tablet; Take 3 tablets a day for  2 days then take  2 tablet a day for 2 days then 1 pill for 2 days then 1/2 pill for 2 days         Orders Placed This Encounter   Medications    predniSONE (DELTASONE) 20 MG tablet     Sig: Take 3 tablets a day for  2 days then take  2 tablet a day for 2 days then 1 pill for 2 days then 1/2 pill for 2 days     Dispense:  13 tablet     Refill:  0      Ok for topical steroid, benadryl prn         Subjective   Rash      Patches of a rash on the right leg worse left leg   Abd ,  Right wrist  Very itching    Going on  2 weeks  Pulled poison oak     Review of Systems   Skin:  Positive for rash.          Objective   Patient-Reported Vitals  No data recorded     Physical Exam  Constitutional:       Appearance: Normal appearance. She is obese.   Pulmonary:      Effort: Pulmonary effort is normal.   Skin:     Comments: Right leg, right wrist rash     Right wrist linear     Neurological:      Mental Status: She is alert.   Psychiatric:         Mood and Affect: Mood normal.         Behavior: Behavior normal.         Thought Content: Thought content normal.         Judgment: Judgment normal.                Garima Taylor, was evaluated through a synchronous (real-time) audio-video encounter. The patient (or guardian if applicable) is aware that this is a billable service, which includes applicable co-pays. This Virtual Visit was conducted with patient's (and/or legal guardian's) consent. Patient identification was verified, and a caregiver was present when appropriate.   The patient was located at Home: [address] [place] [id number]  Provider was located at Facility (Appt Dept): [address]  [address]  [place], [place]

## 2024-05-24 ENCOUNTER — OFFICE VISIT (OUTPATIENT)
Dept: FAMILY MEDICINE CLINIC | Age: 49
End: 2024-05-24
Payer: COMMERCIAL

## 2024-05-24 VITALS
DIASTOLIC BLOOD PRESSURE: 68 MMHG | OXYGEN SATURATION: 99 % | RESPIRATION RATE: 16 BRPM | HEART RATE: 78 BPM | BODY MASS INDEX: 47.45 KG/M2 | SYSTOLIC BLOOD PRESSURE: 112 MMHG | WEIGHT: 293 LBS

## 2024-05-24 DIAGNOSIS — R73.03 PREDIABETES: Primary | ICD-10-CM

## 2024-05-24 DIAGNOSIS — L23.7 POISON IVY DERMATITIS: ICD-10-CM

## 2024-05-24 LAB — HBA1C MFR BLD: 6.4 %

## 2024-05-24 PROCEDURE — G8427 DOCREV CUR MEDS BY ELIG CLIN: HCPCS | Performed by: INTERNAL MEDICINE

## 2024-05-24 PROCEDURE — 1036F TOBACCO NON-USER: CPT | Performed by: INTERNAL MEDICINE

## 2024-05-24 PROCEDURE — 83036 HEMOGLOBIN GLYCOSYLATED A1C: CPT | Performed by: INTERNAL MEDICINE

## 2024-05-24 PROCEDURE — 99214 OFFICE O/P EST MOD 30 MIN: CPT | Performed by: INTERNAL MEDICINE

## 2024-05-24 PROCEDURE — G8417 CALC BMI ABV UP PARAM F/U: HCPCS | Performed by: INTERNAL MEDICINE

## 2024-05-24 RX ORDER — SUMATRIPTAN 100 MG/1
TABLET, FILM COATED ORAL
Qty: 9 TABLET | Refills: 0 | Status: SHIPPED | OUTPATIENT
Start: 2024-05-24

## 2024-05-24 NOTE — PROGRESS NOTES
Garima Taylor (:  1975) is a 48 y.o. female, here for evaluation of the following chief complaint(s):  Rash (Patient is here for a 3 month med follow up the patient would like Dr. Nava to look at her poison oak rash ) and Headache (Patient would like to discuss her headaches that she's has for 1 week )      Assessment & Plan     Assessment/Plan  Garima was seen today for rash and headache.    Diagnoses and all orders for this visit:    Prediabetes  -     POCT glycosylated hemoglobin (Hb A1C)    Poison ivy dermatitis    Other orders  -     SUMAtriptan (IMITREX) 100 MG tablet; Take one with the onset of migraine then may repeat in  2 hours if needed. Max  2 pills/24 hours         Orders Placed This Encounter   Medications    SUMAtriptan (IMITREX) 100 MG tablet     Sig: Take one with the onset of migraine then may repeat in  2 hours if needed. Max  2 pills/24 hours     Dispense:  9 tablet     Refill:  0        Has poison ivy arms and neck  I think this may be coming from the dog  Will try imitrex for headache  Hga1c is much higher off victoza  Restart when able to afford the victoza~2 weeks  The vyvanse working well  Bp ok    Follow up 3months    Subjective   SUBJECTIVE/OBJECTIVE:  Rash    Headache      Kwl1f=2.4  Uses to take liraglutide  On 1000mg metformin BID    Had a headache  1 1/2 weeks    Ears feel congested  Sometimes hurt      Washed bed twice  There is a little bit of poison ivy  6.4 hga1c      Here for ADHD follow up   Medication working well  Only had 2 mistakes in 2 months  On lisdexamfetamine  Work is much better  Does not eat that much     Had a headache  1 1/2 weeks   Had it last night  Not always at night      Hemoglobin A1C (%)   Date Value   2024 6.4   2024 5.8   10/12/2023 5.7       Sodium (mmol/L)   Date Value   2024 143   2023 140   2023 146 (H)     Potassium (mmol/L)   Date Value   2024 4.3   2023 4.2   2023 3.3 (L)     Chloride

## 2024-05-31 ENCOUNTER — TELEMEDICINE (OUTPATIENT)
Dept: FAMILY MEDICINE CLINIC | Age: 49
End: 2024-05-31
Payer: COMMERCIAL

## 2024-05-31 DIAGNOSIS — H02.846 SWELLING OF LEFT EYELID: ICD-10-CM

## 2024-05-31 DIAGNOSIS — R11.2 NAUSEA AND VOMITING, UNSPECIFIED VOMITING TYPE: Primary | ICD-10-CM

## 2024-05-31 PROCEDURE — 99213 OFFICE O/P EST LOW 20 MIN: CPT | Performed by: INTERNAL MEDICINE

## 2024-05-31 PROCEDURE — G8427 DOCREV CUR MEDS BY ELIG CLIN: HCPCS | Performed by: INTERNAL MEDICINE

## 2024-05-31 ASSESSMENT — ENCOUNTER SYMPTOMS: NAUSEA: 1

## 2024-05-31 NOTE — PROGRESS NOTES
Garima Taylor (:  1975) is here for evaluation of the following:    Assessment/Plan  Garima was seen today for nausea & vomiting.    Diagnoses and all orders for this visit:    Nausea and vomiting, unspecified vomiting type    Swelling of left eyelid       Pt looks ill, on couch  Having multiple episodes n&V  She also had a very swollen eyelid earlier in the week that was attributed to possible Poison ivy  Not treated for periorbital cellulitis  I am wondering if she might have an infection  She needs to go to the ER for fluids, labs and possible antibiotics  She eyelid will be better seen in person  Encouraged her to get someone to take her to ER  She wanted to go to ER near her home  [facility]    Subjective   Nausea & Vomiting  Associated symptoms include nausea.     Eye is less swollen  Vomiting 3am -9am  multiple times  , could not stop  Woke up at  1 started vomiting again  Has vomited multiple  times  Can't do much  Drinking little bits of gatoraid  No sexenda in months  Only on metformin  Not having a lot of diarrhea  Everytimes she vomits can urinate  Softer stool    Her eyelid was swollen shut   Treated like  it was poison ivy  It was swollen shut 5 days ago  Eyelid is more red than it was   Chills   Daughter was sick last week and vomited a couple time  Temp   98.2  Some abd pain near belly button she thinks from vomiting      Review of Systems   Gastrointestinal:  Positive for nausea.          Objective   Patient-Reported Vitals  No data recorded     Physical Exam  Constitutional:       Appearance: Normal appearance. She is obese. She is ill-appearing.   HENT:      Head:      Comments: Left upper lid  pink?little swollen  Pulmonary:      Effort: Pulmonary effort is normal.   Neurological:      Mental Status: She is alert.   Psychiatric:         Mood and Affect: Mood normal.         Thought Content: Thought content normal.                Garima Taylor, was evaluated through a synchronous

## 2024-06-07 ENCOUNTER — OFFICE VISIT (OUTPATIENT)
Dept: FAMILY MEDICINE CLINIC | Age: 49
End: 2024-06-07
Payer: COMMERCIAL

## 2024-06-07 VITALS
BODY MASS INDEX: 48.26 KG/M2 | DIASTOLIC BLOOD PRESSURE: 76 MMHG | HEART RATE: 77 BPM | RESPIRATION RATE: 16 BRPM | WEIGHT: 293 LBS | OXYGEN SATURATION: 97 % | SYSTOLIC BLOOD PRESSURE: 124 MMHG

## 2024-06-07 DIAGNOSIS — R25.2 LEG CRAMPS: ICD-10-CM

## 2024-06-07 DIAGNOSIS — L23.9 ALLERGIC CONTACT DERMATITIS, UNSPECIFIED TRIGGER: ICD-10-CM

## 2024-06-07 DIAGNOSIS — F90.2 ATTENTION DEFICIT HYPERACTIVITY DISORDER (ADHD), COMBINED TYPE: ICD-10-CM

## 2024-06-07 DIAGNOSIS — R25.2 LEG CRAMPS: Primary | ICD-10-CM

## 2024-06-07 DIAGNOSIS — R80.9 PROTEINURIA, UNSPECIFIED TYPE: ICD-10-CM

## 2024-06-07 LAB
ALBUMIN SERPL-MCNC: 4.5 G/DL (ref 3.4–5)
ANION GAP SERPL CALCULATED.3IONS-SCNC: 13 MMOL/L (ref 3–16)
BUN SERPL-MCNC: 18 MG/DL (ref 7–20)
CALCIUM SERPL-MCNC: 9.8 MG/DL (ref 8.3–10.6)
CHLORIDE SERPL-SCNC: 95 MMOL/L (ref 99–110)
CO2 SERPL-SCNC: 29 MMOL/L (ref 21–32)
CREAT SERPL-MCNC: 1.1 MG/DL (ref 0.6–1.1)
GFR SERPLBLD CREATININE-BSD FMLA CKD-EPI: 62 ML/MIN/{1.73_M2}
GLUCOSE SERPL-MCNC: 224 MG/DL (ref 70–99)
MAGNESIUM SERPL-MCNC: 1.9 MG/DL (ref 1.8–2.4)
PHOSPHATE SERPL-MCNC: 4.6 MG/DL (ref 2.5–4.9)
POTASSIUM SERPL-SCNC: 4.1 MMOL/L (ref 3.5–5.1)
SODIUM SERPL-SCNC: 137 MMOL/L (ref 136–145)

## 2024-06-07 PROCEDURE — 1036F TOBACCO NON-USER: CPT | Performed by: INTERNAL MEDICINE

## 2024-06-07 PROCEDURE — 99214 OFFICE O/P EST MOD 30 MIN: CPT | Performed by: INTERNAL MEDICINE

## 2024-06-07 PROCEDURE — G8427 DOCREV CUR MEDS BY ELIG CLIN: HCPCS | Performed by: INTERNAL MEDICINE

## 2024-06-07 PROCEDURE — G8417 CALC BMI ABV UP PARAM F/U: HCPCS | Performed by: INTERNAL MEDICINE

## 2024-06-07 RX ORDER — TIZANIDINE 2 MG/1
TABLET ORAL
Qty: 25 TABLET | Refills: 0 | Status: SHIPPED | OUTPATIENT
Start: 2024-06-07

## 2024-06-07 RX ORDER — LISDEXAMFETAMINE DIMESYLATE 40 MG/1
CAPSULE ORAL
COMMUNITY
End: 2024-06-07 | Stop reason: SDUPTHER

## 2024-06-07 RX ORDER — LISDEXAMFETAMINE DIMESYLATE 40 MG/1
CAPSULE ORAL
Qty: 30 CAPSULE | Refills: 0 | Status: SHIPPED | OUTPATIENT
Start: 2024-06-07 | End: 2024-07-07

## 2024-06-07 NOTE — PROGRESS NOTES
Date Value   01/18/2024 112   10/12/2023 124     Triglycerides (mg/dL)   Date Value   01/18/2024 175 (H)   10/12/2023 207 (H)     HDL (mg/dL)   Date Value   01/18/2024 31 (L)   10/12/2023 38 (L)       ALT (U/L)   Date Value   01/18/2024 25   09/20/2023 36     AST (U/L)   Date Value   01/18/2024 20   09/20/2023 23       TSH (uIU/mL)   Date Value   01/18/2024 1.89   10/12/2023 1.24       WBC   Date Value   01/18/2024 10.8 K/uL   11/17/2023 10.4 10^3/mL   09/20/2023 7.8 K/uL     Hemoglobin (g/dL)   Date Value   01/18/2024 13.4   11/17/2023 14.0   09/20/2023 13.2     Hematocrit (%)   Date Value   01/18/2024 39.3   11/17/2023 42.3   09/20/2023 37.4     MCV (fL)   Date Value   01/18/2024 85.4   11/17/2023 85.8   09/20/2023 85.3     Platelets   Date Value   01/18/2024 228 K/uL   11/17/2023 197 K/µL   09/20/2023 184 K/uL       No results found for: \"PSA\"     No results found for: \"LABURIC\"     Vitals:    06/07/24 1402   BP: 124/76   Pulse: 77   Resp: 16   SpO2: 97%       BP Readings from Last 3 Encounters:   06/07/24 124/76   05/24/24 112/68   04/04/24 138/84        Wt Readings from Last 3 Encounters:   06/07/24 135.6 kg (299 lb)   05/24/24 133.4 kg (294 lb)   04/04/24 132.9 kg (293 lb)      Controlled Substance Monitoring:    Acute and Chronic Pain Monitoring:   RX Monitoring Periodic Controlled Substance Monitoring   6/7/2024   2:50 PM No signs of potential drug abuse or diversion identified.        Review of Systems       Objective   Physical Exam  Constitutional:       Appearance: Normal appearance. She is obese.   HENT:      Head: Normocephalic and atraumatic.      Comments: Very slight swelling around the left eye  No redness   Maybe swelling in the left face   Pulmonary:      Effort: Pulmonary effort is normal.   Neurological:      Mental Status: She is alert.   Psychiatric:         Mood and Affect: Mood normal.         Behavior: Behavior normal.         Thought Content: Thought content normal.         Judgment:

## 2024-06-07 NOTE — PATIENT INSTRUCTIONS
potassium content foods  Highest content (>25 mEq/100 g)   Dried figs   Molasses   Seaweed   Very high content (>12.5 mEq/100 g)   Dried fruits (dates, prunes)   Nuts   Avocados   Bran cereals   Wheat germ   Lima beans    High content (>6.2 mEq/100 g)   Vegetables   Spinach   Tomatoes   Broccoli   Winter squash   Beets   Carrots   Cauliflower   Potatoes   Fruits   Bananas   Cantaloupe   Kiwis   Oranges   Mangos   Meats   Ground beef   Steak   Pork   Veal   Lamb      Adapted from: Dilshad GOMEZ. Hypokalemia. N Engl J Med 1998; 339:451.  Graphic 56573 Version 4.0

## 2024-06-08 LAB
CREAT UR-MCNC: 43 MG/DL (ref 28–259)
MICROALBUMIN UR DL<=1MG/L-MCNC: <1.2 MG/DL
MICROALBUMIN/CREAT UR: NORMAL MG/G (ref 0–30)

## 2024-06-11 RX ORDER — TRIAMCINOLONE ACETONIDE 1 MG/G
CREAM TOPICAL
Qty: 80 G | Refills: 0 | Status: SHIPPED | OUTPATIENT
Start: 2024-06-11 | End: 2025-06-11

## 2024-06-16 DIAGNOSIS — R25.2 LEG CRAMPS: ICD-10-CM

## 2024-06-17 RX ORDER — TIZANIDINE 2 MG/1
TABLET ORAL
Qty: 150 TABLET | Refills: 1 | OUTPATIENT
Start: 2024-06-17

## 2024-07-11 DIAGNOSIS — F90.2 ATTENTION DEFICIT HYPERACTIVITY DISORDER (ADHD), COMBINED TYPE: Primary | ICD-10-CM

## 2024-07-11 RX ORDER — LISDEXAMFETAMINE DIMESYLATE 30 MG/1
30 CAPSULE ORAL EVERY MORNING
OUTPATIENT
Start: 2024-07-11

## 2024-07-11 RX ORDER — LISDEXAMFETAMINE DIMESYLATE 30 MG/1
30 CAPSULE ORAL EVERY MORNING
COMMUNITY
Start: 2024-06-15

## 2024-07-11 RX ORDER — FERROUS SULFATE 324(65)MG
1 TABLET, DELAYED RELEASE (ENTERIC COATED) ORAL 2 TIMES DAILY
Qty: 30 TABLET | OUTPATIENT
Start: 2024-07-11

## 2024-07-11 NOTE — TELEPHONE ENCOUNTER
I can refill the ADHD med  However she was not anemic in jan ( last time it was checked )  So I don't give iron unless needed because you can get iron overload.  I can have her come in and we can alee labs  at an  appt.

## 2024-07-11 NOTE — TELEPHONE ENCOUNTER
I can't refill the adhd med until Monday  Have her request then  When I change the date to dispense it won't give her the full amount  Per [place] filled on June 17

## 2024-07-11 NOTE — TELEPHONE ENCOUNTER
Called the patient.  She will call Monday for her med refill.  On her Iron tablets she does not want to get off the medication due to her levels going back to low.  She states she has been an experiment for a long time with being taken off FE and the put back on. Also taking every other day has been tried and failed.  Patient states within a couple of days of not being on Iron she will be very fatigue and she won't even be able to lift her arms over her head.  Patient would like to stay on the Iron supplement.

## 2024-07-12 ENCOUNTER — TELEPHONE (OUTPATIENT)
Dept: FAMILY MEDICINE CLINIC | Age: 49
End: 2024-07-12

## 2024-07-12 NOTE — TELEPHONE ENCOUNTER
Called patient and left her a voice mail about taking her iron otc.  Lvm that she will recheck her iron level at her next visit.

## 2024-07-12 NOTE — TELEPHONE ENCOUNTER
Pls tell her she can take otc iron - it is the same but I don't want to rx it unless I have checked levels recently to document  that it is low.  I have not checked levels in several months.    It really is not good to take too much iron that is why it is important to check levels  We can do that at the next appt

## 2024-07-16 DIAGNOSIS — F90.2 ATTENTION DEFICIT HYPERACTIVITY DISORDER (ADHD), COMBINED TYPE: Primary | ICD-10-CM

## 2024-07-16 RX ORDER — LISDEXAMFETAMINE DIMESYLATE 30 MG/1
30 CAPSULE ORAL DAILY
Qty: 30 CAPSULE | Refills: 0 | Status: SHIPPED | OUTPATIENT
Start: 2024-07-16 | End: 2024-07-18

## 2024-07-18 DIAGNOSIS — F90.2 ATTENTION DEFICIT HYPERACTIVITY DISORDER (ADHD), COMBINED TYPE: ICD-10-CM

## 2024-07-18 RX ORDER — LISDEXAMFETAMINE DIMESYLATE 40 MG/1
CAPSULE ORAL
Qty: 30 CAPSULE | Refills: 0 | Status: SHIPPED | OUTPATIENT
Start: 2024-07-18 | End: 2024-08-17

## 2024-07-25 DIAGNOSIS — R25.2 LEG CRAMPS: ICD-10-CM

## 2024-07-25 RX ORDER — TIZANIDINE 2 MG/1
TABLET ORAL
Qty: 25 TABLET | Refills: 0 | OUTPATIENT
Start: 2024-07-25

## 2024-08-15 DIAGNOSIS — F90.2 ATTENTION DEFICIT HYPERACTIVITY DISORDER (ADHD), COMBINED TYPE: ICD-10-CM

## 2024-08-15 RX ORDER — LISDEXAMFETAMINE DIMESYLATE 40 MG/1
CAPSULE ORAL
Qty: 30 CAPSULE | Refills: 0 | Status: SHIPPED | OUTPATIENT
Start: 2024-08-16 | End: 2024-09-14

## 2024-08-19 RX ORDER — ROSUVASTATIN CALCIUM 10 MG/1
10 TABLET, COATED ORAL DAILY
Qty: 90 TABLET | Refills: 1 | Status: SHIPPED | OUTPATIENT
Start: 2024-08-19

## 2024-08-19 RX ORDER — PANTOPRAZOLE SODIUM 40 MG/1
40 TABLET, DELAYED RELEASE ORAL DAILY
Qty: 90 TABLET | Refills: 0 | Status: SHIPPED | OUTPATIENT
Start: 2024-08-19

## 2024-09-03 ENCOUNTER — TELEPHONE (OUTPATIENT)
Dept: FAMILY MEDICINE CLINIC | Age: 49
End: 2024-09-03

## 2024-09-03 ENCOUNTER — TELEMEDICINE (OUTPATIENT)
Dept: FAMILY MEDICINE CLINIC | Age: 49
End: 2024-09-03
Payer: COMMERCIAL

## 2024-09-03 DIAGNOSIS — R05.1 ACUTE COUGH: Primary | ICD-10-CM

## 2024-09-03 DIAGNOSIS — R05.1 ACUTE COUGH: ICD-10-CM

## 2024-09-03 PROCEDURE — G8427 DOCREV CUR MEDS BY ELIG CLIN: HCPCS | Performed by: INTERNAL MEDICINE

## 2024-09-03 PROCEDURE — 99213 OFFICE O/P EST LOW 20 MIN: CPT | Performed by: INTERNAL MEDICINE

## 2024-09-03 PROCEDURE — G2211 COMPLEX E/M VISIT ADD ON: HCPCS | Performed by: INTERNAL MEDICINE

## 2024-09-03 RX ORDER — DICLOFENAC SODIUM 75 MG/1
1 TABLET, DELAYED RELEASE ORAL 2 TIMES DAILY
COMMUNITY

## 2024-09-03 RX ORDER — BENZONATATE 100 MG/1
100-200 CAPSULE ORAL 3 TIMES DAILY PRN
Qty: 40 CAPSULE | Refills: 0 | Status: SHIPPED | OUTPATIENT
Start: 2024-09-03

## 2024-09-03 RX ORDER — SEMAGLUTIDE 0.25 MG/.5ML
0.25 INJECTION, SOLUTION SUBCUTANEOUS
COMMUNITY
Start: 2024-07-26

## 2024-09-03 RX ORDER — DOXYCYCLINE HYCLATE 100 MG
100 TABLET ORAL 2 TIMES DAILY
COMMUNITY
Start: 2024-08-28 | End: 2024-09-06

## 2024-09-03 RX ORDER — BENZONATATE 100 MG/1
100-200 CAPSULE ORAL 3 TIMES DAILY PRN
Qty: 40 CAPSULE | Refills: 0 | Status: CANCELLED | OUTPATIENT
Start: 2024-09-03

## 2024-09-03 RX ORDER — AZITHROMYCIN 250 MG/1
TABLET, FILM COATED ORAL
Qty: 6 TABLET | Refills: 0 | Status: SHIPPED | OUTPATIENT
Start: 2024-09-03 | End: 2024-09-03

## 2024-09-03 RX ORDER — BENZONATATE 100 MG/1
CAPSULE ORAL
COMMUNITY
Start: 2024-08-28

## 2024-09-03 RX ORDER — BENZONATATE 100 MG/1
CAPSULE ORAL
Status: CANCELLED | OUTPATIENT
Start: 2024-09-03

## 2024-09-03 ASSESSMENT — ENCOUNTER SYMPTOMS: COUGH: 1

## 2024-09-03 NOTE — PROGRESS NOTES
Garima Taylor (:  1975) is here for evaluation of the following:    Assessment/Plan  Assessment & Plan  Acute cough       Orders:    Dextromethorphan-Pyrilamine 30-30 MG TABS; One tablet every  6-8 hours prn cough.    benzonatate (TESSALON PERLES) 100 MG capsule; Take 1-2 capsules by mouth 3 times daily as needed for Cough    Was treated with prednisone and doxy at urgent care   Sees me   3 days in the days for a follow up       Subjective   Cough  Pertinent negatives include no chills or fever.   Went to urgent care and was given  tessalon, steroids  And doxy  Finished the doxy, steroids  Coughing  solid 2 week but was coughing one week before that  Can't get over a cough  Coughing over 2 week  Better than it was but not ok  Has tessalon perles in the day  Tried dayquil , nyquil  When she lies down the cough  Mucinex dm did not help a lot  \"Pees\" with coughing    Daughter had pna  Everyone tested for covid flu and strep      Review of Systems   Constitutional:  Negative for chills and fever.        Initially  had temp  102    Respiratory:  Positive for cough.           Objective   Patient-Reported Vitals  No data recorded     Physical Exam  Constitutional:       Appearance: Normal appearance. She is obese.   HENT:      Head: Normocephalic.   Pulmonary:      Effort: Pulmonary effort is normal.   Neurological:      Mental Status: She is alert.   Psychiatric:         Mood and Affect: Mood normal.         Behavior: Behavior normal.         Thought Content: Thought content normal.         Judgment: Judgment normal.              No follow-ups on file.     Garima Taylor, was evaluated through a synchronous (real-time) audio-video encounter. The patient (or guardian if applicable) is aware that this is a billable service, which includes applicable co-pays. This Virtual Visit was conducted with patient's (and/or legal guardian's) consent. Patient identification was verified, and a caregiver was present when

## 2024-09-06 ENCOUNTER — OFFICE VISIT (OUTPATIENT)
Dept: FAMILY MEDICINE CLINIC | Age: 49
End: 2024-09-06

## 2024-09-06 VITALS
BODY MASS INDEX: 47.45 KG/M2 | SYSTOLIC BLOOD PRESSURE: 128 MMHG | WEIGHT: 293 LBS | OXYGEN SATURATION: 97 % | RESPIRATION RATE: 16 BRPM | DIASTOLIC BLOOD PRESSURE: 74 MMHG | HEART RATE: 86 BPM

## 2024-09-06 DIAGNOSIS — R25.2 LEG CRAMPS: ICD-10-CM

## 2024-09-06 DIAGNOSIS — R05.1 ACUTE COUGH: ICD-10-CM

## 2024-09-06 DIAGNOSIS — R51.9 SINUS HEADACHE: ICD-10-CM

## 2024-09-06 DIAGNOSIS — F90.9 ATTENTION DEFICIT HYPERACTIVITY DISORDER (ADHD), UNSPECIFIED ADHD TYPE: Primary | ICD-10-CM

## 2024-09-09 LAB — PAP SMEAR, EXTERNAL: NEGATIVE

## 2024-09-16 DIAGNOSIS — F90.2 ATTENTION DEFICIT HYPERACTIVITY DISORDER (ADHD), COMBINED TYPE: ICD-10-CM

## 2024-09-16 RX ORDER — LISDEXAMFETAMINE DIMESYLATE 40 MG/1
CAPSULE ORAL
Qty: 30 CAPSULE | Refills: 0 | Status: SHIPPED | OUTPATIENT
Start: 2024-09-16 | End: 2024-10-15

## 2024-09-27 DIAGNOSIS — Z86.39 HISTORY OF IRON DEFICIENCY: Primary | ICD-10-CM

## 2024-09-27 DIAGNOSIS — R25.2 LEG CRAMPS: ICD-10-CM

## 2024-09-27 RX ORDER — TIZANIDINE 2 MG/1
TABLET ORAL
Qty: 25 TABLET | Refills: 0 | Status: SHIPPED | OUTPATIENT
Start: 2024-09-27

## 2024-10-04 DIAGNOSIS — Z86.39 HISTORY OF IRON DEFICIENCY: ICD-10-CM

## 2024-10-04 LAB
FERRITIN SERPL IA-MCNC: 82.6 NG/ML (ref 15–150)
HCT VFR BLD AUTO: 40.8 % (ref 36–48)
HGB BLD-MCNC: 13.4 G/DL (ref 12–16)
IRON SATN MFR SERPL: 19 % (ref 15–50)
IRON SERPL-MCNC: 74 UG/DL (ref 37–145)
TIBC SERPL-MCNC: 394 UG/DL (ref 260–445)

## 2024-10-08 ENCOUNTER — OFFICE VISIT (OUTPATIENT)
Dept: FAMILY MEDICINE CLINIC | Age: 49
End: 2024-10-08
Payer: COMMERCIAL

## 2024-10-08 VITALS
HEART RATE: 102 BPM | TEMPERATURE: 98.5 F | SYSTOLIC BLOOD PRESSURE: 106 MMHG | RESPIRATION RATE: 16 BRPM | DIASTOLIC BLOOD PRESSURE: 70 MMHG | BODY MASS INDEX: 47.78 KG/M2 | OXYGEN SATURATION: 97 % | WEIGHT: 293 LBS

## 2024-10-08 DIAGNOSIS — H92.09 OTALGIA, UNSPECIFIED LATERALITY: ICD-10-CM

## 2024-10-08 DIAGNOSIS — R51.9 ACUTE NONINTRACTABLE HEADACHE, UNSPECIFIED HEADACHE TYPE: ICD-10-CM

## 2024-10-08 DIAGNOSIS — R50.9 FEVER, UNSPECIFIED FEVER CAUSE: Primary | ICD-10-CM

## 2024-10-08 DIAGNOSIS — R10.9 FLANK PAIN: ICD-10-CM

## 2024-10-08 LAB
BILIRUBIN, POC: NORMAL
BLOOD URINE, POC: NORMAL
CLARITY, POC: CLEAR
COLOR, POC: NORMAL
GLUCOSE URINE, POC: NORMAL MG/DL
INFLUENZA A ANTIGEN, POC: NEGATIVE
INFLUENZA B ANTIGEN, POC: NEGATIVE
KETONES, POC: NORMAL MG/DL
LEUKOCYTE EST, POC: NORMAL
LOT EXPIRE DATE: NORMAL
LOT KIT NUMBER: NORMAL
NITRITE, POC: NORMAL
PH, POC: 6
PROTEIN, POC: NORMAL MG/DL
SARS-COV-2, POC: NORMAL
SPECIFIC GRAVITY, POC: 1.03
UROBILINOGEN, POC: 1 MG/DL
VALID INTERNAL CONTROL: NORMAL
VENDOR AND KIT NAME POC: NORMAL

## 2024-10-08 PROCEDURE — 87428 SARSCOV & INF VIR A&B AG IA: CPT | Performed by: INTERNAL MEDICINE

## 2024-10-08 PROCEDURE — G8417 CALC BMI ABV UP PARAM F/U: HCPCS | Performed by: INTERNAL MEDICINE

## 2024-10-08 PROCEDURE — G8484 FLU IMMUNIZE NO ADMIN: HCPCS | Performed by: INTERNAL MEDICINE

## 2024-10-08 PROCEDURE — G8427 DOCREV CUR MEDS BY ELIG CLIN: HCPCS | Performed by: INTERNAL MEDICINE

## 2024-10-08 PROCEDURE — 1036F TOBACCO NON-USER: CPT | Performed by: INTERNAL MEDICINE

## 2024-10-08 PROCEDURE — G2211 COMPLEX E/M VISIT ADD ON: HCPCS | Performed by: INTERNAL MEDICINE

## 2024-10-08 PROCEDURE — 99213 OFFICE O/P EST LOW 20 MIN: CPT | Performed by: INTERNAL MEDICINE

## 2024-10-08 PROCEDURE — 81002 URINALYSIS NONAUTO W/O SCOPE: CPT | Performed by: INTERNAL MEDICINE

## 2024-10-08 SDOH — ECONOMIC STABILITY: FOOD INSECURITY: WITHIN THE PAST 12 MONTHS, YOU WORRIED THAT YOUR FOOD WOULD RUN OUT BEFORE YOU GOT MONEY TO BUY MORE.: NEVER TRUE

## 2024-10-08 SDOH — ECONOMIC STABILITY: FOOD INSECURITY: WITHIN THE PAST 12 MONTHS, THE FOOD YOU BOUGHT JUST DIDN'T LAST AND YOU DIDN'T HAVE MONEY TO GET MORE.: NEVER TRUE

## 2024-10-08 SDOH — ECONOMIC STABILITY: INCOME INSECURITY: HOW HARD IS IT FOR YOU TO PAY FOR THE VERY BASICS LIKE FOOD, HOUSING, MEDICAL CARE, AND HEATING?: NOT HARD AT ALL

## 2024-10-08 ASSESSMENT — ENCOUNTER SYMPTOMS: BACK PAIN: 1

## 2024-10-08 NOTE — PROGRESS NOTES
spine  OTHER:  None    IMPRESSION      No acute abnormality on CT of abdomen and pelvis  Exam End: 06/08/23 17:41    Specimen Collected: 06/08/23 17:45 Last Resulted: 06/08/23 17:50   Received [facility]: [facility]  Result Received: 10/13/23 09:30      1/18/2012     IMPRESSION:     1.  Partially obstructing 5 x 6 mm stone, right UPJ with mild hydronephrosis and proximal hydroureter.       Had lithotripsy yrs ago  Dose not want to go back to urology group    Hemoglobin A1C (%)   Date Value   05/24/2024 6.4   01/18/2024 5.8   10/12/2023 5.7       Sodium (mmol/L)   Date Value   06/07/2024 137   01/18/2024 143   09/25/2023 140     Potassium (mmol/L)   Date Value   06/07/2024 4.1   01/18/2024 4.3   09/25/2023 4.2     Chloride (mmol/L)   Date Value   06/07/2024 95 (L)   01/18/2024 104   09/25/2023 104     CO2 (mmol/L)   Date Value   06/07/2024 29   01/18/2024 28   09/25/2023 24     BUN (mg/dL)   Date Value   06/07/2024 18   01/18/2024 13   09/25/2023 9     Creatinine (mg/dL)   Date Value   06/07/2024 1.1   01/18/2024 0.7   09/25/2023 0.8     Glucose (mg/dL)   Date Value   06/07/2024 224 (H)   01/18/2024 107 (H)   09/25/2023 110 (H)     Calcium (mg/dL)   Date Value   06/07/2024 9.8   01/18/2024 9.3   09/25/2023 8.8       Cholesterol, Total (mg/dL)   Date Value   01/18/2024 112   10/12/2023 124     Triglycerides (mg/dL)   Date Value   01/18/2024 175 (H)   10/12/2023 207 (H)     HDL (mg/dL)   Date Value   01/18/2024 31 (L)   10/12/2023 38 (L)       ALT (U/L)   Date Value   01/18/2024 25   09/20/2023 36     AST (U/L)   Date Value   01/18/2024 20   09/20/2023 23       TSH (uIU/mL)   Date Value   01/18/2024 1.89   10/12/2023 1.24       WBC   Date Value   01/18/2024 10.8 K/uL   11/17/2023 10.4 10^3/mL   09/20/2023 7.8 K/uL     Hemoglobin (g/dL)   Date Value   10/04/2024 13.4   01/18/2024 13.4   11/17/2023 14.0     Hematocrit (%)   Date Value   10/04/2024 40.8   01/18/2024 39.3   11/17/2023 42.3     MCV

## 2024-10-09 DIAGNOSIS — R50.9 FEVER, UNSPECIFIED FEVER CAUSE: Primary | ICD-10-CM

## 2024-10-09 DIAGNOSIS — R50.9 FEVER, UNSPECIFIED FEVER CAUSE: ICD-10-CM

## 2024-10-09 LAB
ALBUMIN SERPL-MCNC: 4.3 G/DL (ref 3.4–5)
ALBUMIN/GLOB SERPL: 1.8 {RATIO} (ref 1.1–2.2)
ALP SERPL-CCNC: 108 U/L (ref 40–129)
ALT SERPL-CCNC: 74 U/L (ref 10–40)
ANION GAP SERPL CALCULATED.3IONS-SCNC: 13 MMOL/L (ref 3–16)
AST SERPL-CCNC: 39 U/L (ref 15–37)
BACTERIA URNS QL MICRO: ABNORMAL /HPF
BASOPHILS # BLD: 0.1 K/UL (ref 0–0.2)
BASOPHILS NFR BLD: 0.7 %
BILIRUB SERPL-MCNC: 0.4 MG/DL (ref 0–1)
BILIRUB UR QL STRIP.AUTO: NEGATIVE
BUN SERPL-MCNC: 13 MG/DL (ref 7–20)
CALCIUM SERPL-MCNC: 9.7 MG/DL (ref 8.3–10.6)
CHLORIDE SERPL-SCNC: 101 MMOL/L (ref 99–110)
CLARITY UR: ABNORMAL
CO2 SERPL-SCNC: 26 MMOL/L (ref 21–32)
COLOR UR: YELLOW
CREAT SERPL-MCNC: 1.1 MG/DL (ref 0.6–1.1)
DEPRECATED RDW RBC AUTO: 13.7 % (ref 12.4–15.4)
EOSINOPHIL # BLD: 0.1 K/UL (ref 0–0.6)
EOSINOPHIL NFR BLD: 1 %
EPI CELLS #/AREA URNS AUTO: 4 /HPF (ref 0–5)
GFR SERPLBLD CREATININE-BSD FMLA CKD-EPI: 62 ML/MIN/{1.73_M2}
GLUCOSE SERPL-MCNC: 125 MG/DL (ref 70–99)
GLUCOSE UR STRIP.AUTO-MCNC: NEGATIVE MG/DL
HCT VFR BLD AUTO: 40.3 % (ref 36–48)
HGB BLD-MCNC: 13.4 G/DL (ref 12–16)
HGB UR QL STRIP.AUTO: NEGATIVE
HYALINE CASTS #/AREA URNS AUTO: 1 /LPF (ref 0–8)
KETONES UR STRIP.AUTO-MCNC: ABNORMAL MG/DL
LEUKOCYTE ESTERASE UR QL STRIP.AUTO: ABNORMAL
LYMPHOCYTES # BLD: 2.5 K/UL (ref 1–5.1)
LYMPHOCYTES NFR BLD: 21.4 %
MCH RBC QN AUTO: 29.5 PG (ref 26–34)
MCHC RBC AUTO-ENTMCNC: 33.2 G/DL (ref 31–36)
MCV RBC AUTO: 88.8 FL (ref 80–100)
MONOCYTES # BLD: 1 K/UL (ref 0–1.3)
MONOCYTES NFR BLD: 8.6 %
NEUTROPHILS # BLD: 8 K/UL (ref 1.7–7.7)
NEUTROPHILS NFR BLD: 68.3 %
NITRITE UR QL STRIP.AUTO: NEGATIVE
PH UR STRIP.AUTO: 5.5 [PH] (ref 5–8)
PLATELET # BLD AUTO: 184 K/UL (ref 135–450)
PMV BLD AUTO: 8.3 FL (ref 5–10.5)
POTASSIUM SERPL-SCNC: 3.8 MMOL/L (ref 3.5–5.1)
PROT SERPL-MCNC: 6.7 G/DL (ref 6.4–8.2)
PROT UR STRIP.AUTO-MCNC: ABNORMAL MG/DL
RBC # BLD AUTO: 4.54 M/UL (ref 4–5.2)
RBC CLUMPS #/AREA URNS AUTO: 1 /HPF (ref 0–4)
SODIUM SERPL-SCNC: 140 MMOL/L (ref 136–145)
SP GR UR STRIP.AUTO: 1.02 (ref 1–1.03)
UA DIPSTICK W REFLEX MICRO PNL UR: YES
URN SPEC COLLECT METH UR: ABNORMAL
UROBILINOGEN UR STRIP-ACNC: 1 E.U./DL
WBC # BLD AUTO: 11.7 K/UL (ref 4–11)
WBC #/AREA URNS AUTO: 15 /HPF (ref 0–5)

## 2024-10-10 LAB — BACTERIA UR CULT: NORMAL

## 2024-10-14 RX ORDER — LEVOCETIRIZINE DIHYDROCHLORIDE 5 MG/1
5 TABLET, FILM COATED ORAL EVERY EVENING
Qty: 90 TABLET | Refills: 1 | Status: SHIPPED | OUTPATIENT
Start: 2024-10-14

## 2024-10-20 DIAGNOSIS — F90.2 ATTENTION DEFICIT HYPERACTIVITY DISORDER (ADHD), COMBINED TYPE: ICD-10-CM

## 2024-10-21 RX ORDER — LISDEXAMFETAMINE DIMESYLATE 40 MG/1
CAPSULE ORAL
Qty: 30 CAPSULE | Refills: 0 | Status: SHIPPED | OUTPATIENT
Start: 2024-10-21 | End: 2024-11-18

## 2024-11-17 ENCOUNTER — PATIENT MESSAGE (OUTPATIENT)
Dept: FAMILY MEDICINE CLINIC | Age: 49
End: 2024-11-17

## 2024-11-17 DIAGNOSIS — F90.2 ATTENTION DEFICIT HYPERACTIVITY DISORDER (ADHD), COMBINED TYPE: ICD-10-CM

## 2024-11-17 DIAGNOSIS — R60.0 BILATERAL LOWER EXTREMITY EDEMA: ICD-10-CM

## 2024-11-18 RX ORDER — FUROSEMIDE 40 MG/1
TABLET ORAL
Qty: 90 TABLET | Refills: 1 | Status: SHIPPED | OUTPATIENT
Start: 2024-11-18

## 2024-11-18 RX ORDER — PANTOPRAZOLE SODIUM 40 MG/1
40 TABLET, DELAYED RELEASE ORAL DAILY
Qty: 90 TABLET | Refills: 1 | Status: SHIPPED | OUTPATIENT
Start: 2024-11-18

## 2024-11-21 ENCOUNTER — TELEPHONE (OUTPATIENT)
Dept: FAMILY MEDICINE CLINIC | Age: 49
End: 2024-11-21

## 2024-11-21 DIAGNOSIS — F90.2 ATTENTION DEFICIT HYPERACTIVITY DISORDER (ADHD), COMBINED TYPE: ICD-10-CM

## 2024-11-21 RX ORDER — LISDEXAMFETAMINE DIMESYLATE 40 MG/1
40 CAPSULE ORAL DAILY
Qty: 30 CAPSULE | Refills: 0 | Status: SHIPPED | OUTPATIENT
Start: 2024-11-21 | End: 2024-12-21

## 2024-11-21 RX ORDER — LISDEXAMFETAMINE DIMESYLATE 30 MG/1
30 CAPSULE ORAL DAILY
Qty: 30 CAPSULE | Refills: 0 | OUTPATIENT
Start: 2024-11-21 | End: 2024-12-21

## 2024-11-22 ENCOUNTER — OFFICE VISIT (OUTPATIENT)
Dept: FAMILY MEDICINE CLINIC | Age: 49
End: 2024-11-22

## 2024-11-22 VITALS
HEART RATE: 70 BPM | WEIGHT: 293 LBS | DIASTOLIC BLOOD PRESSURE: 70 MMHG | OXYGEN SATURATION: 98 % | RESPIRATION RATE: 16 BRPM | SYSTOLIC BLOOD PRESSURE: 138 MMHG | BODY MASS INDEX: 47.94 KG/M2

## 2024-11-22 DIAGNOSIS — M25.50 ARTHRALGIA, UNSPECIFIED JOINT: ICD-10-CM

## 2024-11-22 DIAGNOSIS — R73.03 PREDIABETES: Primary | ICD-10-CM

## 2024-11-22 DIAGNOSIS — Z13.21 ENCOUNTER FOR VITAMIN DEFICIENCY SCREENING: ICD-10-CM

## 2024-11-22 DIAGNOSIS — Z86.39 HISTORY OF IRON DEFICIENCY: ICD-10-CM

## 2024-11-22 DIAGNOSIS — R79.89 ELEVATED LFTS: ICD-10-CM

## 2024-11-22 LAB
25(OH)D3 SERPL-MCNC: 20.9 NG/ML
ALT SERPL-CCNC: 30 U/L (ref 10–40)
AST SERPL-CCNC: 26 U/L (ref 15–37)
CRP SERPL-MCNC: <3 MG/L (ref 0–5.1)
ERYTHROCYTE [SEDIMENTATION RATE] IN BLOOD BY WESTERGREN METHOD: 4 MM/HR (ref 0–20)
HBA1C MFR BLD: 5.9 %
RHEUMATOID FACT SER IA-ACNC: <10 IU/ML

## 2024-11-22 NOTE — PROGRESS NOTES
Garima Taylor (:  1975) is a 49 y.o. female, here for evaluation of the following chief complaint(s):  Diabetes (Patient is here for a DM follow up )      Assessment & Plan     Assessment/Plan  Garima was seen today for diabetes.    Diagnoses and all orders for this visit:    Prediabetes  -     POCT glycosylated hemoglobin (Hb A1C)    Arthralgia, unspecified joint  -     Cyclic Citrul Peptide Antibody, IgG; Future  -     Sedimentation Rate; Future  -     Rheumatoid Factor; Future  -     C-Reactive Protein; Future  -     DEMETRIO Reflex to Antibody Cascade; Future    Elevated LFTs  -     AST; Future  -     ALT; Future    History of iron deficiency  -     Iron and TIBC; Future  -     Ferritin; Future  -     Hemoglobin and Hematocrit; Future    Encounter for vitamin deficiency screening  -     Vitamin D 25 Hydroxy; Future    Repeat liver function test  May have been related to illness or diflenac  Swimming labs  Ck RA panel  Repeat iron end of dec  Do cpe in       Declines flu shot      Subjective   SUBJECTIVE/OBJECTIVE:  Diabetes      ADHD  On vyvanse, mistakes down one a month from 50 last year  Production higher  She wanted name brand and was able to get it  Doing follow up  Was able to get     Has arthritis both feet , both ankles , left knee - had a torn meniscus  Back hurts     Pain also in the mcp joints  Feels stiffness    Elevated ast/alt  Will repeat    She is asking about repeating iron studies  Off iron 2  months   She was surprised she was ok  Gyn did a us  Was told the us showed a  possible reason for anemia    Felt so much better with the  diclofenac 75 bid  Tried tylenol and it does not work    Hga1c  5.9    Hemoglobin A1C (%)   Date Value   2024 5.9   2024 6.4   2024 5.8       Sodium (mmol/L)   Date Value   10/09/2024 140   2024 137   2024 143     Potassium (mmol/L)   Date Value   10/09/2024 3.8   2024 4.1   2024 4.3     Chloride (mmol/L)   Date

## 2024-11-23 LAB
ANA SER QL IA: NEGATIVE
CCP IGG SERPL-ACNC: <0.5 U/ML (ref 0–2.9)

## 2024-12-13 DIAGNOSIS — Z91.09 ENVIRONMENTAL ALLERGIES: ICD-10-CM

## 2024-12-13 RX ORDER — FLUTICASONE PROPIONATE 50 MCG
SPRAY, SUSPENSION (ML) NASAL
Qty: 1 EACH | Refills: 5 | Status: SHIPPED | OUTPATIENT
Start: 2024-12-13

## 2024-12-19 ENCOUNTER — TELEPHONE (OUTPATIENT)
Dept: FAMILY MEDICINE CLINIC | Age: 49
End: 2024-12-19

## 2024-12-19 DIAGNOSIS — F90.2 ATTENTION DEFICIT HYPERACTIVITY DISORDER (ADHD), COMBINED TYPE: ICD-10-CM

## 2024-12-19 RX ORDER — ONDANSETRON 4 MG/1
4 TABLET, ORALLY DISINTEGRATING ORAL EVERY 12 HOURS PRN
Qty: 10 TABLET | Refills: 0 | Status: SHIPPED | OUTPATIENT
Start: 2024-12-19

## 2024-12-19 RX ORDER — LISDEXAMFETAMINE DIMESYLATE 40 MG/1
40 CAPSULE ORAL DAILY
Qty: 30 CAPSULE | Refills: 0 | Status: SHIPPED | OUTPATIENT
Start: 2024-12-19 | End: 2025-01-18

## 2024-12-19 NOTE — TELEPHONE ENCOUNTER
Patient is requesting this medication ONDANSETRON that she has used before for nauseous ness     She has been taking it to prevent her from getting sick because her family has been sick all week and she has been cleaning up vomit    Pharmacy Verified    Please Advise

## 2024-12-20 LAB
CHOLESTEROL, TOTAL: 142 MG/DL
CHOLESTEROL/HDL RATIO: ABNORMAL
HDLC SERPL-MCNC: 34 MG/DL (ref 35–70)
LDL CHOLESTEROL: 80
NONHDLC SERPL-MCNC: ABNORMAL MG/DL
TRIGL SERPL-MCNC: 141 MG/DL
VLDLC SERPL CALC-MCNC: ABNORMAL MG/DL

## 2025-01-21 ENCOUNTER — TELEMEDICINE (OUTPATIENT)
Dept: FAMILY MEDICINE CLINIC | Age: 50
End: 2025-01-21

## 2025-01-21 DIAGNOSIS — J10.1 INFLUENZA A: Primary | ICD-10-CM

## 2025-01-21 DIAGNOSIS — F90.2 ATTENTION DEFICIT HYPERACTIVITY DISORDER (ADHD), COMBINED TYPE: ICD-10-CM

## 2025-01-21 RX ORDER — LISDEXAMFETAMINE DIMESYLATE 40 MG/1
40 CAPSULE ORAL DAILY
Qty: 30 CAPSULE | Refills: 0 | Status: CANCELLED | OUTPATIENT
Start: 2025-01-21 | End: 2025-02-20

## 2025-01-21 RX ORDER — OSELTAMIVIR PHOSPHATE 75 MG/1
75 CAPSULE ORAL 2 TIMES DAILY
Qty: 10 CAPSULE | Refills: 0 | Status: SHIPPED | OUTPATIENT
Start: 2025-01-21 | End: 2025-01-26

## 2025-01-21 RX ORDER — LISDEXAMFETAMINE DIMESYLATE 40 MG/1
40 CAPSULE ORAL DAILY
Qty: 30 CAPSULE | Refills: 0 | Status: SHIPPED | OUTPATIENT
Start: 2025-01-21 | End: 2025-02-20

## 2025-01-21 SDOH — ECONOMIC STABILITY: FOOD INSECURITY: WITHIN THE PAST 12 MONTHS, THE FOOD YOU BOUGHT JUST DIDN'T LAST AND YOU DIDN'T HAVE MONEY TO GET MORE.: NEVER TRUE

## 2025-01-21 SDOH — ECONOMIC STABILITY: FOOD INSECURITY: WITHIN THE PAST 12 MONTHS, YOU WORRIED THAT YOUR FOOD WOULD RUN OUT BEFORE YOU GOT MONEY TO BUY MORE.: NEVER TRUE

## 2025-01-21 ASSESSMENT — ENCOUNTER SYMPTOMS
FLU SYMPTOMS: 1
CONSTIPATION: 0
SHORTNESS OF BREATH: 0
DIARRHEA: 0
COUGH: 0

## 2025-01-21 ASSESSMENT — PATIENT HEALTH QUESTIONNAIRE - PHQ9
SUM OF ALL RESPONSES TO PHQ9 QUESTIONS 1 & 2: 0
SUM OF ALL RESPONSES TO PHQ QUESTIONS 1-9: 0
SUM OF ALL RESPONSES TO PHQ QUESTIONS 1-9: 0
1. LITTLE INTEREST OR PLEASURE IN DOING THINGS: NOT AT ALL
SUM OF ALL RESPONSES TO PHQ QUESTIONS 1-9: 0
SUM OF ALL RESPONSES TO PHQ QUESTIONS 1-9: 0
2. FEELING DOWN, DEPRESSED OR HOPELESS: NOT AT ALL

## 2025-01-21 NOTE — PROGRESS NOTES
Garima Taylor (:  1975) is here for evaluation of the following:    Assessment/Plan  Assessment & Plan  Attention deficit hyperactivity disorder (ADHD), combined type       Orders:    VYVANSE 40 MG CAPS; Take 40 mg by mouth daily for 30 days. Max Daily Amount: 40 mg    Influenza A   Appeared fine on the tele visit    Orders:    oseltamivir (TAMIFLU) 75 MG capsule; Take 1 capsule by mouth 2 times daily for 5 days      Orders Placed This Encounter   Medications    VYVANSE 40 MG CAPS     Sig: Take 40 mg by mouth daily for 30 days. Max Daily Amount: 40 mg     Dispense:  30 capsule     Refill:  0    oseltamivir (TAMIFLU) 75 MG capsule     Sig: Take 1 capsule by mouth 2 times daily for 5 days     Dispense:  10 capsule     Refill:  0      Pre op 3 days for upcoming surgery on her ankle  There are no Patient Instructions on file for this visit.   Subjective   Influenza  Pertinent negatives include no coughing or fever (now).     Bought a home flu test  and it was +  3 nights ago  Sleep all day for  2 days   Drank water  Last night sweats  Has mucinex  Some congestion head  and ears.    Vyvanse is working well  Really helped work  No errors last month    Tendon tear in the left ankle  Needs surgery  Has pre op sched    Surgery is the 30th  Sees me in 3 days for preop    Review of Systems   Constitutional:  Negative for fever (now).   Respiratory:  Negative for cough and shortness of breath.    Gastrointestinal:  Negative for constipation and diarrhea.          Objective   Patient-Reported Vitals  No data recorded     Physical Exam  Constitutional:       Appearance: Normal appearance. She is obese.   HENT:      Head: Normocephalic and atraumatic.   Pulmonary:      Effort: Pulmonary effort is normal.   Neurological:      Mental Status: She is alert.   Psychiatric:         Mood and Affect: Mood normal.         Behavior: Behavior normal.         Thought Content: Thought content normal.         Judgment: Judgment normal.

## 2025-01-23 ENCOUNTER — TELEPHONE (OUTPATIENT)
Dept: FAMILY MEDICINE CLINIC | Age: 50
End: 2025-01-23

## 2025-01-23 NOTE — TELEPHONE ENCOUNTER
That is fine for  140 but I am not sure she will be able to come then if her father's appt procedure is running late.  We talked about this at her appt but at that time I thought her dad's appt was in the early am.  she will need to resched for next week if any later than 140

## 2025-01-23 NOTE — TELEPHONE ENCOUNTER
Patient has an appointment tomorrow at 12:20 for her pre-op but is taking her Dad to his appointment at 11 and cannot leave him.    Can she move her pre-op to 1:40?    She cannot come in the following week due to work    Please Advise

## 2025-01-23 NOTE — TELEPHONE ENCOUNTER
-Please contact me via patient portal or call my office for any concerns.  -Please don't  drive or operate heavy machinery while feeling drowsy, use precautionary measures like pulling over, taking a brief nap when driving or taking break to take a nap while working.    - TRY TO WALK 30 MINUTES/DAY. CAN DO IN BRIEF INTERVALS, DOESN'T HAVE TO BE IN A SINGLE STRETCH.     Source: Healthy Eating Plate; https://[email or web address]/nutritionsource/healthy-eating-plate/    Building a Healthy and Balanced Diet  Make most of your meal vegetables and fruits - ½ of your plate.  Aim for color and variety, and remember that potatoes don’t count as vegetables on the Healthy Eating Plate because of their negative impact on blood sugar.    Go for whole grains - ¼ of your plate.  Whole and intact grains--whole wheat, barley, wheat berries, quinoa, oats, brown rice, and foods made with them, such as whole wheat pasta--have a milder effect on blood sugar and insulin than white bread, white rice, and other refined grains.    Protein power - ¼ of your plate.  Fish, poultry, beans, and nuts are all healthy, versatile protein sources--they can be mixed into salads, and pair well with vegetables on a plate. Limit red meat, and avoid processed meats such as betancourt and sausage.    Healthy plant oils - in moderation.  Choose healthy vegetable oils like olive, canola, soy, corn, sunflower, peanut, and others, and avoid partially hydrogenated oils, which contain unhealthy trans fats. Remember that low-fat does not mean “healthy.”    Drink water, coffee, or tea.  Skip sugary drinks, limit milk and dairy products to one to two servings per day, and limit juice to a small glass per day.    Stay active.  The red figure running across the Healthy Eating Plate’s placemat is a reminder that staying active is also important in weight control.    The main message of the Healthy Eating Plate is to focus on diet quality:    The type of carbohydrate in the  Changed patients appointment time    diet is more important than the amount of carbohydrate in the diet, because some sources of carbohydrate--like vegetables (other than potatoes), fruits, whole grains, and beans--are healthier than others.  The Healthy Eating Plate also advises consumers to avoid sugary beverages, a major source of calories--usually with little nutritional value--in the American diet.  The Healthy Eating Plate encourages consumers to use healthy oils, and it does not set a maximum on the percentage of calories people should get each day from healthy sources of fat. In this way, the Healthy Eating Plate recommends the opposite of the low-fat message promoted for decades by the USDA.

## 2025-01-24 ENCOUNTER — TELEPHONE (OUTPATIENT)
Dept: FAMILY MEDICINE CLINIC | Age: 50
End: 2025-01-24

## 2025-01-24 NOTE — TELEPHONE ENCOUNTER
Pt calling in, not able to be seen for pre op today. Needing before 1/30/2025.     Scheduled pre op for Monday with Dr. Nava    Pt would like to knwo if labs can be ordered today so she can compete those and have results for visit on Monday.   Refer to 1/15 encounter and order to find out blood type.    Please advise  Pt can be reached at [phone]

## 2025-01-27 ENCOUNTER — OFFICE VISIT (OUTPATIENT)
Dept: FAMILY MEDICINE CLINIC | Age: 50
End: 2025-01-27
Payer: COMMERCIAL

## 2025-01-27 VITALS
WEIGHT: 285 LBS | SYSTOLIC BLOOD PRESSURE: 128 MMHG | OXYGEN SATURATION: 98 % | RESPIRATION RATE: 18 BRPM | BODY MASS INDEX: 46 KG/M2 | HEART RATE: 66 BPM | DIASTOLIC BLOOD PRESSURE: 70 MMHG

## 2025-01-27 DIAGNOSIS — R05.1 ACUTE COUGH: ICD-10-CM

## 2025-01-27 DIAGNOSIS — J10.1 INFLUENZA A: ICD-10-CM

## 2025-01-27 DIAGNOSIS — E28.2 PCOS (POLYCYSTIC OVARIAN SYNDROME): ICD-10-CM

## 2025-01-27 DIAGNOSIS — Z86.39 HISTORY OF IRON DEFICIENCY: ICD-10-CM

## 2025-01-27 DIAGNOSIS — Z01.818 PRE-OP EXAMINATION: Primary | ICD-10-CM

## 2025-01-27 DIAGNOSIS — K21.9 GASTROESOPHAGEAL REFLUX DISEASE WITHOUT ESOPHAGITIS: ICD-10-CM

## 2025-01-27 DIAGNOSIS — R19.7 BLOODY DIARRHEA: ICD-10-CM

## 2025-01-27 DIAGNOSIS — R60.0 BILATERAL LOWER EXTREMITY EDEMA: ICD-10-CM

## 2025-01-27 DIAGNOSIS — F90.9 ATTENTION DEFICIT HYPERACTIVITY DISORDER (ADHD), UNSPECIFIED ADHD TYPE: ICD-10-CM

## 2025-01-27 DIAGNOSIS — M67.02 SHORT ACHILLES TENDON OF LEFT LOWER EXTREMITY: ICD-10-CM

## 2025-01-27 LAB
ANION GAP SERPL CALCULATED.3IONS-SCNC: 10 MMOL/L (ref 3–16)
BUN SERPL-MCNC: 11 MG/DL (ref 7–20)
CALCIUM SERPL-MCNC: 9.5 MG/DL (ref 8.3–10.6)
CHLORIDE SERPL-SCNC: 101 MMOL/L (ref 99–110)
CO2 SERPL-SCNC: 30 MMOL/L (ref 21–32)
CREAT SERPL-MCNC: 0.8 MG/DL (ref 0.6–1.1)
DEPRECATED RDW RBC AUTO: 13.8 % (ref 12.4–15.4)
FERRITIN SERPL IA-MCNC: 69.1 NG/ML (ref 15–150)
GFR SERPLBLD CREATININE-BSD FMLA CKD-EPI: 90 ML/MIN/{1.73_M2}
GLUCOSE SERPL-MCNC: 113 MG/DL (ref 70–99)
HCT VFR BLD AUTO: 39 % (ref 36–48)
HCT VFR BLD AUTO: 39.6 % (ref 36–48)
HGB BLD-MCNC: 13.4 G/DL (ref 12–16)
HGB BLD-MCNC: 13.4 G/DL (ref 12–16)
IRON SATN MFR SERPL: 16 % (ref 15–50)
IRON SERPL-MCNC: 58 UG/DL (ref 37–145)
MCH RBC QN AUTO: 28.9 PG (ref 26–34)
MCHC RBC AUTO-ENTMCNC: 33.7 G/DL (ref 31–36)
MCV RBC AUTO: 85.7 FL (ref 80–100)
PLATELET # BLD AUTO: 197 K/UL (ref 135–450)
PMV BLD AUTO: 8.2 FL (ref 5–10.5)
POTASSIUM SERPL-SCNC: 3.7 MMOL/L (ref 3.5–5.1)
RBC # BLD AUTO: 4.62 M/UL (ref 4–5.2)
SODIUM SERPL-SCNC: 141 MMOL/L (ref 136–145)
TIBC SERPL-MCNC: 363 UG/DL (ref 260–445)
WBC # BLD AUTO: 8.1 K/UL (ref 4–11)

## 2025-01-27 PROCEDURE — 1036F TOBACCO NON-USER: CPT | Performed by: INTERNAL MEDICINE

## 2025-01-27 PROCEDURE — G8427 DOCREV CUR MEDS BY ELIG CLIN: HCPCS | Performed by: INTERNAL MEDICINE

## 2025-01-27 PROCEDURE — G8417 CALC BMI ABV UP PARAM F/U: HCPCS | Performed by: INTERNAL MEDICINE

## 2025-01-27 PROCEDURE — 99215 OFFICE O/P EST HI 40 MIN: CPT | Performed by: INTERNAL MEDICINE

## 2025-01-27 RX ORDER — BENZONATATE 100 MG/1
100-200 CAPSULE ORAL 3 TIMES DAILY PRN
Qty: 40 CAPSULE | Refills: 0 | Status: SHIPPED | OUTPATIENT
Start: 2025-01-27

## 2025-01-27 ASSESSMENT — ENCOUNTER SYMPTOMS
CONSTIPATION: 0
NAUSEA: 0
WHEEZING: 0
DIARRHEA: 0
SHORTNESS OF BREATH: 0
VOMITING: 0
EYE PAIN: 0
COLOR CHANGE: 0

## 2025-01-27 NOTE — PROGRESS NOTES
Garima Taylor (:  1975) is a 49 y.o. female, here for evaluation of the following chief complaint(s):  Pre-op Exam (Patient is here for a pre-op she's having surgery [facility] at [facility] with Dr. Isidro Gillespie from [facility] /Phone: [phone]/FAX: [phone] and [phone])      Assessment & Plan     Assessment/Plan  Garima was seen today for pre-op exam.    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  Still coughing, improved per pt, clear sputum today  Pt said she talked to a nurse today from surgery  Took  2 does of tamiflu had \"bloody diarrhea\"  ( unclear why?)  No further diarrhea   Has a cardiologist and he was going to send something in regarding surgery according to pt    Holding all meds other than metoprolol and protonix that she will take the morning of the surgery.    Await labs H&H , iron studies had been ordered by me  Bnp , mrsa nasal swab cbc ordered by ortho    Will update the pre op with labs once they are back    Follow up 3m    Time    41  min      Addendeum: from lab done  25  Lab showed normal hemoglobin and hct.  Iron studies ok    Subjective   SUBJECTIVE/OBJECTIVE:  HPI  Pre op for left ankle surgery  Tendon repair per pt  With [facility]  Surgery on Thursday in  3 days    Dx with flu with a

## 2025-01-27 NOTE — PROGRESS NOTES
[facility] PRE-SURGICAL TESTING INSTRUCTIONS                      PRIOR TO PROCEDURE DATE:    1. PLEASE FOLLOW ANY INSTRUCTIONS GIVEN TO YOU PER YOUR SURGEON.      2. Arrange for someone to drive you home and be with you for the first 24 hours after discharge for your safety after your procedure for which you received sedation. Ensure it is someone we can share information with regarding your discharge.     NOTE: At this time ONLY 2 ADULTS may accompany you   One person ENCOURAGED to stay at hospital entire time if outpatient surgery      3. You must contact your surgeon for instructions IF:  You are taking any blood thinners, aspirin, anti-inflammatory or vitamins.  There is a change in your physical condition such as a cold, fever, rash, cuts, sores, or any other infection, especially near your surgical site.    4. Do not drink alcohol the day before or day of your procedure.  Do not use any recreational marijuana at least 24 hours or street drugs (heroin, cocaine) at minimum 5 days prior to your procedure.     5. A Pre-Surgical History and Physical MUST be completed WITHIN 30 DAYS OR LESS prior to your procedure.by your Physician or an Urgent Care        THE DAY OF YOUR PROCEDURE:  1.  Follow instructions for ARRIVAL TIME as DIRECTED BY YOUR SURGEON.     2. Enter the MAIN entrance from [facility] and follow the signs to the free Parking Garage or  Parking (offered free of charge 7 am-5pm).      3. Enter the Main Entrance of the hospital (do not enter from the lower level of the parking garage). Upon entrance, check in with the  at the surgical information desk on your LEFT.   Bring your insurance card and photo ID to register      4. DO NOT EAT ANYTHING 8 hours prior to arrival for surgery.  You may have up to 8 ounces of water 4 hours prior to your arrival for surgery.   NOTE: ALL Gastric, Bariatric & Bowel surgery patients - you MUST follow your surgeon's instructions regarding  as extreme drowsiness, changes in your vital signs or breathing patterns. Nausea, headache, muscle aches, or sore throat may also occur after anesthesia.  Your nurse will help you manage these potential side effects.    2. For comfort and safety, arrange to have someone at home with you for the first 24 hours after discharge.    3. You and your family will be given written instructions about your diet, activity, dressing care, medications, and return visits.     4. Once at home, should issues with nausea, pain, or bleeding occur, or should you notice any signs of infection, you should call your surgeon.    5. Always clean your hands before and after caring for your wound. Do not let your family touch your surgery site without cleaning their hands.     6. Narcotic pain medications can cause significant constipation.  You may want to add a stool softener to your postoperative medication schedule or speak to your surgeon on how best to manage this SIDE EFFECT.    SPECIAL INSTRUCTIONS     Thank you for allowing us to care for you.  We strive to exceed your expectations in the delivery of care and service provided to you and your family.     If you need to contact the Pre-Admission Testing staff for any reason, please call us at [phone]    Instructions reviewed with patient during preadmission testing phone interview.  Johnathan June RN.1/27/2025 .2:00 PM      ADDITIONAL EDUCATIONAL INFORMATION REVIEWED PER PHONE WITH YOU AND/OR YOUR FAMILY:   Hibiclens® Bathing Instructions   Yes Antibacterial Soap

## 2025-01-30 ENCOUNTER — ANESTHESIA EVENT (OUTPATIENT)
Dept: OPERATING ROOM | Age: 50
End: 2025-01-30
Payer: COMMERCIAL

## 2025-01-30 ENCOUNTER — HOSPITAL ENCOUNTER (OUTPATIENT)
Age: 50
Setting detail: OUTPATIENT SURGERY
Discharge: HOME | End: 2025-01-30
Attending: ORTHOPAEDIC SURGERY | Admitting: ORTHOPAEDIC SURGERY
Payer: COMMERCIAL

## 2025-01-30 ENCOUNTER — ANESTHESIA (OUTPATIENT)
Dept: OPERATING ROOM | Age: 50
End: 2025-01-30
Payer: COMMERCIAL

## 2025-01-30 VITALS
HEART RATE: 79 BPM | HEIGHT: 66 IN | WEIGHT: 293 LBS | DIASTOLIC BLOOD PRESSURE: 81 MMHG | BODY MASS INDEX: 47.09 KG/M2 | OXYGEN SATURATION: 96 % | RESPIRATION RATE: 19 BRPM | TEMPERATURE: 98.3 F | SYSTOLIC BLOOD PRESSURE: 121 MMHG

## 2025-01-30 DIAGNOSIS — M67.02 SHORT ACHILLES TENDON (ACQUIRED), LEFT ANKLE: ICD-10-CM

## 2025-01-30 DIAGNOSIS — M19.072 OSTEOARTHROSIS OF ANKLE AND FOOT, LEFT: ICD-10-CM

## 2025-01-30 DIAGNOSIS — S86.812S: ICD-10-CM

## 2025-01-30 DIAGNOSIS — M21.6X2 OTHER ACQUIRED DEFORMITIES OF LEFT FOOT: ICD-10-CM

## 2025-01-30 DIAGNOSIS — S93.422S SPRAIN OF DELTOID LIGAMENT OF LEFT ANKLE, SEQUELA: ICD-10-CM

## 2025-01-30 LAB
GLUCOSE BLD-MCNC: 98 MG/DL (ref 70–99)
HCG UR QL: NEGATIVE
MRSA SPEC QL CULT: NORMAL
PERFORMED ON: NORMAL

## 2025-01-30 PROCEDURE — 3600000004 HC SURGERY LEVEL 4 BASE: Performed by: ORTHOPAEDIC SURGERY

## 2025-01-30 PROCEDURE — 6360000002 HC RX W HCPCS: Performed by: ORTHOPAEDIC SURGERY

## 2025-01-30 PROCEDURE — 6360000002 HC RX W HCPCS

## 2025-01-30 PROCEDURE — 2709999900 HC NON-CHARGEABLE SUPPLY: Performed by: ORTHOPAEDIC SURGERY

## 2025-01-30 PROCEDURE — 64445 NJX AA&/STRD SCIATIC NRV IMG: CPT | Performed by: ANESTHESIOLOGY

## 2025-01-30 PROCEDURE — 2720000010 HC SURG SUPPLY STERILE: Performed by: ORTHOPAEDIC SURGERY

## 2025-01-30 PROCEDURE — 2500000003 HC RX 250 WO HCPCS

## 2025-01-30 PROCEDURE — 3600000014 HC SURGERY LEVEL 4 ADDTL 15MIN: Performed by: ORTHOPAEDIC SURGERY

## 2025-01-30 PROCEDURE — 3700000001 HC ADD 15 MINUTES (ANESTHESIA): Performed by: ORTHOPAEDIC SURGERY

## 2025-01-30 PROCEDURE — C1713 ANCHOR/SCREW BN/BN,TIS/BN: HCPCS | Performed by: ORTHOPAEDIC SURGERY

## 2025-01-30 PROCEDURE — 2500000003 HC RX 250 WO HCPCS: Performed by: NURSE ANESTHETIST, CERTIFIED REGISTERED

## 2025-01-30 PROCEDURE — 7100000010 HC PHASE II RECOVERY - FIRST 15 MIN: Performed by: ORTHOPAEDIC SURGERY

## 2025-01-30 PROCEDURE — 6360000002 HC RX W HCPCS: Performed by: ANESTHESIOLOGY

## 2025-01-30 PROCEDURE — C1769 GUIDE WIRE: HCPCS | Performed by: ORTHOPAEDIC SURGERY

## 2025-01-30 PROCEDURE — 84703 CHORIONIC GONADOTROPIN ASSAY: CPT

## 2025-01-30 PROCEDURE — 6360000002 HC RX W HCPCS: Performed by: NURSE ANESTHETIST, CERTIFIED REGISTERED

## 2025-01-30 PROCEDURE — 2500000003 HC RX 250 WO HCPCS: Performed by: ORTHOPAEDIC SURGERY

## 2025-01-30 PROCEDURE — 2580000003 HC RX 258

## 2025-01-30 PROCEDURE — 3700000000 HC ANESTHESIA ATTENDED CARE: Performed by: ORTHOPAEDIC SURGERY

## 2025-01-30 PROCEDURE — 7100000001 HC PACU RECOVERY - ADDTL 15 MIN: Performed by: ORTHOPAEDIC SURGERY

## 2025-01-30 PROCEDURE — 7100000011 HC PHASE II RECOVERY - ADDTL 15 MIN: Performed by: ORTHOPAEDIC SURGERY

## 2025-01-30 PROCEDURE — 2580000003 HC RX 258: Performed by: ANESTHESIOLOGY

## 2025-01-30 PROCEDURE — 64447 NJX AA&/STRD FEMORAL NRV IMG: CPT | Performed by: ANESTHESIOLOGY

## 2025-01-30 PROCEDURE — 7100000000 HC PACU RECOVERY - FIRST 15 MIN: Performed by: ORTHOPAEDIC SURGERY

## 2025-01-30 PROCEDURE — 88304 TISSUE EXAM BY PATHOLOGIST: CPT

## 2025-01-30 DEVICE — FIXATION BEAM, 6.5 X 80 MM
Type: IMPLANTABLE DEVICE | Site: HEEL | Status: FUNCTIONAL
Brand: AXIS

## 2025-01-30 DEVICE — SCREW INTRF L15MM DIA4.75MM W/ SZ 2 FIBERWIRE SUT AND DISP: Type: IMPLANTABLE DEVICE | Site: HEEL | Status: FUNCTIONAL

## 2025-01-30 DEVICE — FIXATION BEAM, 6.5 X 75 MM
Type: IMPLANTABLE DEVICE | Site: HEEL | Status: FUNCTIONAL
Brand: AXIS

## 2025-01-30 DEVICE — GRAFT BNE 5 ML SUBSTITUTE VIABLE OSSIGRAFT: Type: IMPLANTABLE DEVICE | Site: FOOT | Status: FUNCTIONAL

## 2025-01-30 RX ORDER — VANCOMYCIN 2 G/400ML
15 INJECTION, SOLUTION INTRAVENOUS ONCE
Status: COMPLETED | OUTPATIENT
Start: 2025-01-30 | End: 2025-01-30

## 2025-01-30 RX ORDER — SODIUM CHLORIDE, SODIUM LACTATE, POTASSIUM CHLORIDE, CALCIUM CHLORIDE 600; 310; 30; 20 MG/100ML; MG/100ML; MG/100ML; MG/100ML
INJECTION, SOLUTION INTRAVENOUS CONTINUOUS
Status: DISCONTINUED | OUTPATIENT
Start: 2025-01-30 | End: 2025-01-30 | Stop reason: HOSPADM

## 2025-01-30 RX ORDER — FENTANYL CITRATE 50 UG/ML
INJECTION, SOLUTION INTRAMUSCULAR; INTRAVENOUS
Status: COMPLETED | OUTPATIENT
Start: 2025-01-30 | End: 2025-01-30

## 2025-01-30 RX ORDER — ONDANSETRON 2 MG/ML
INJECTION INTRAMUSCULAR; INTRAVENOUS
Status: DISCONTINUED | OUTPATIENT
Start: 2025-01-30 | End: 2025-01-30 | Stop reason: SDUPTHER

## 2025-01-30 RX ORDER — NALOXONE HYDROCHLORIDE 0.4 MG/ML
INJECTION, SOLUTION INTRAMUSCULAR; INTRAVENOUS; SUBCUTANEOUS PRN
Status: DISCONTINUED | OUTPATIENT
Start: 2025-01-30 | End: 2025-01-30 | Stop reason: HOSPADM

## 2025-01-30 RX ORDER — ONDANSETRON 2 MG/ML
INJECTION INTRAMUSCULAR; INTRAVENOUS
Status: DISCONTINUED
Start: 2025-01-30 | End: 2025-01-30 | Stop reason: HOSPADM

## 2025-01-30 RX ORDER — FENTANYL CITRATE 50 UG/ML
25 INJECTION, SOLUTION INTRAMUSCULAR; INTRAVENOUS EVERY 5 MIN PRN
Status: DISCONTINUED | OUTPATIENT
Start: 2025-01-30 | End: 2025-01-30 | Stop reason: HOSPADM

## 2025-01-30 RX ORDER — MIDAZOLAM HYDROCHLORIDE 1 MG/ML
INJECTION, SOLUTION INTRAMUSCULAR; INTRAVENOUS
Status: COMPLETED | OUTPATIENT
Start: 2025-01-30 | End: 2025-01-30

## 2025-01-30 RX ORDER — SODIUM CHLORIDE 0.9 % (FLUSH) 0.9 %
5-40 SYRINGE (ML) INJECTION EVERY 12 HOURS SCHEDULED
Status: DISCONTINUED | OUTPATIENT
Start: 2025-01-30 | End: 2025-01-30 | Stop reason: HOSPADM

## 2025-01-30 RX ORDER — SODIUM CHLORIDE 9 MG/ML
INJECTION, SOLUTION INTRAVENOUS PRN
Status: DISCONTINUED | OUTPATIENT
Start: 2025-01-30 | End: 2025-01-30 | Stop reason: HOSPADM

## 2025-01-30 RX ORDER — FENTANYL CITRATE 50 UG/ML
INJECTION, SOLUTION INTRAMUSCULAR; INTRAVENOUS
Status: COMPLETED
Start: 2025-01-30 | End: 2025-01-30

## 2025-01-30 RX ORDER — IPRATROPIUM BROMIDE AND ALBUTEROL SULFATE 2.5; .5 MG/3ML; MG/3ML
1 SOLUTION RESPIRATORY (INHALATION)
Status: DISCONTINUED | OUTPATIENT
Start: 2025-01-30 | End: 2025-01-30 | Stop reason: HOSPADM

## 2025-01-30 RX ORDER — ACETAMINOPHEN 325 MG/1
650 TABLET ORAL
Status: DISCONTINUED | OUTPATIENT
Start: 2025-01-30 | End: 2025-01-30 | Stop reason: HOSPADM

## 2025-01-30 RX ORDER — ROPIVACAINE HYDROCHLORIDE 5 MG/ML
INJECTION, SOLUTION EPIDURAL; INFILTRATION; PERINEURAL
Status: COMPLETED
Start: 2025-01-30 | End: 2025-01-30

## 2025-01-30 RX ORDER — LABETALOL HYDROCHLORIDE 5 MG/ML
10 INJECTION, SOLUTION INTRAVENOUS
Status: DISCONTINUED | OUTPATIENT
Start: 2025-01-30 | End: 2025-01-30 | Stop reason: HOSPADM

## 2025-01-30 RX ORDER — HYDROMORPHONE HYDROCHLORIDE 1 MG/ML
0.5 INJECTION, SOLUTION INTRAMUSCULAR; INTRAVENOUS; SUBCUTANEOUS EVERY 5 MIN PRN
Status: DISCONTINUED | OUTPATIENT
Start: 2025-01-30 | End: 2025-01-30 | Stop reason: HOSPADM

## 2025-01-30 RX ORDER — ROCURONIUM BROMIDE 10 MG/ML
INJECTION, SOLUTION INTRAVENOUS
Status: DISCONTINUED | OUTPATIENT
Start: 2025-01-30 | End: 2025-01-30 | Stop reason: SDUPTHER

## 2025-01-30 RX ORDER — HYDROMORPHONE HYDROCHLORIDE 2 MG/ML
INJECTION, SOLUTION INTRAMUSCULAR; INTRAVENOUS; SUBCUTANEOUS
Status: DISCONTINUED | OUTPATIENT
Start: 2025-01-30 | End: 2025-01-30 | Stop reason: SDUPTHER

## 2025-01-30 RX ORDER — PROCHLORPERAZINE EDISYLATE 5 MG/ML
5 INJECTION INTRAMUSCULAR; INTRAVENOUS
Status: DISCONTINUED | OUTPATIENT
Start: 2025-01-30 | End: 2025-01-30 | Stop reason: HOSPADM

## 2025-01-30 RX ORDER — SODIUM CHLORIDE 9 MG/ML
INJECTION, SOLUTION INTRAVENOUS
Status: DISCONTINUED | OUTPATIENT
Start: 2025-01-30 | End: 2025-01-30 | Stop reason: SDUPTHER

## 2025-01-30 RX ORDER — ONDANSETRON 2 MG/ML
4 INJECTION INTRAMUSCULAR; INTRAVENOUS
Status: COMPLETED | OUTPATIENT
Start: 2025-01-30 | End: 2025-01-30

## 2025-01-30 RX ORDER — PROPOFOL 10 MG/ML
INJECTION, EMULSION INTRAVENOUS
Status: DISCONTINUED | OUTPATIENT
Start: 2025-01-30 | End: 2025-01-30 | Stop reason: SDUPTHER

## 2025-01-30 RX ORDER — ROPIVACAINE HYDROCHLORIDE 5 MG/ML
INJECTION, SOLUTION EPIDURAL; INFILTRATION; PERINEURAL
Status: COMPLETED | OUTPATIENT
Start: 2025-01-30 | End: 2025-01-30

## 2025-01-30 RX ORDER — RIVAROXABAN 10 MG/1
10 TABLET, FILM COATED ORAL
COMMUNITY
Start: 2025-01-30

## 2025-01-30 RX ORDER — GLYCOPYRROLATE 0.2 MG/ML
INJECTION INTRAMUSCULAR; INTRAVENOUS
Status: DISCONTINUED | OUTPATIENT
Start: 2025-01-30 | End: 2025-01-30 | Stop reason: SDUPTHER

## 2025-01-30 RX ORDER — MIDAZOLAM HYDROCHLORIDE 1 MG/ML
INJECTION, SOLUTION INTRAMUSCULAR; INTRAVENOUS
Status: COMPLETED
Start: 2025-01-30 | End: 2025-01-30

## 2025-01-30 RX ORDER — LIDOCAINE HYDROCHLORIDE 20 MG/ML
INJECTION, SOLUTION INTRAVENOUS
Status: DISCONTINUED | OUTPATIENT
Start: 2025-01-30 | End: 2025-01-30 | Stop reason: SDUPTHER

## 2025-01-30 RX ORDER — METHOCARBAMOL 100 MG/ML
INJECTION, SOLUTION INTRAMUSCULAR; INTRAVENOUS
Status: DISCONTINUED | OUTPATIENT
Start: 2025-01-30 | End: 2025-01-30 | Stop reason: SDUPTHER

## 2025-01-30 RX ORDER — SODIUM CHLORIDE 0.9 % (FLUSH) 0.9 %
5-40 SYRINGE (ML) INJECTION PRN
Status: DISCONTINUED | OUTPATIENT
Start: 2025-01-30 | End: 2025-01-30 | Stop reason: HOSPADM

## 2025-01-30 RX ORDER — DEXAMETHASONE SODIUM PHOSPHATE 4 MG/ML
INJECTION, SOLUTION INTRA-ARTICULAR; INTRALESIONAL; INTRAMUSCULAR; INTRAVENOUS; SOFT TISSUE
Status: DISCONTINUED | OUTPATIENT
Start: 2025-01-30 | End: 2025-01-30 | Stop reason: SDUPTHER

## 2025-01-30 RX ADMIN — ONDANSETRON 4 MG: 2 INJECTION INTRAMUSCULAR; INTRAVENOUS at 14:28

## 2025-01-30 RX ADMIN — FENTANYL CITRATE 100 MCG: 50 INJECTION, SOLUTION INTRAMUSCULAR; INTRAVENOUS at 14:28

## 2025-01-30 RX ADMIN — HYDROMORPHONE HYDROCHLORIDE 0.4 MG: 2 INJECTION, SOLUTION INTRAMUSCULAR; INTRAVENOUS; SUBCUTANEOUS at 16:37

## 2025-01-30 RX ADMIN — SODIUM CHLORIDE, POTASSIUM CHLORIDE, SODIUM LACTATE AND CALCIUM CHLORIDE: 600; 310; 30; 20 INJECTION, SOLUTION INTRAVENOUS at 12:46

## 2025-01-30 RX ADMIN — PROPOFOL 50 MG: 10 INJECTION, EMULSION INTRAVENOUS at 16:37

## 2025-01-30 RX ADMIN — GLYCOPYRROLATE 0.2 MG: 0.2 INJECTION INTRAMUSCULAR; INTRAVENOUS at 14:49

## 2025-01-30 RX ADMIN — PROPOFOL 150 MG: 10 INJECTION, EMULSION INTRAVENOUS at 14:28

## 2025-01-30 RX ADMIN — MIDAZOLAM HYDROCHLORIDE 2 MG: 1 INJECTION, SOLUTION INTRAMUSCULAR; INTRAVENOUS at 12:54

## 2025-01-30 RX ADMIN — ROCURONIUM BROMIDE 40 MG: 10 INJECTION, SOLUTION INTRAVENOUS at 14:58

## 2025-01-30 RX ADMIN — DEXAMETHASONE SODIUM PHOSPHATE 4 MG: 4 INJECTION INTRA-ARTICULAR; INTRALESIONAL; INTRAMUSCULAR; INTRAVENOUS; SOFT TISSUE at 15:16

## 2025-01-30 RX ADMIN — SODIUM CHLORIDE: 9 INJECTION, SOLUTION INTRAVENOUS at 16:09

## 2025-01-30 RX ADMIN — LIDOCAINE HYDROCHLORIDE 100 MG: 20 INJECTION, SOLUTION INTRAVENOUS at 14:28

## 2025-01-30 RX ADMIN — ONDANSETRON 4 MG: 2 INJECTION INTRAMUSCULAR; INTRAVENOUS at 16:47

## 2025-01-30 RX ADMIN — PROPOFOL 50 MG: 10 INJECTION, EMULSION INTRAVENOUS at 16:58

## 2025-01-30 RX ADMIN — SUGAMMADEX 200 MG: 100 INJECTION, SOLUTION INTRAVENOUS at 16:52

## 2025-01-30 RX ADMIN — ROCURONIUM BROMIDE 20 MG: 10 INJECTION, SOLUTION INTRAVENOUS at 16:07

## 2025-01-30 RX ADMIN — FENTANYL CITRATE 100 MCG: 50 INJECTION, SOLUTION INTRAMUSCULAR; INTRAVENOUS at 12:54

## 2025-01-30 RX ADMIN — ROPIVACAINE HYDROCHLORIDE 20 ML: 5 INJECTION, SOLUTION EPIDURAL; INFILTRATION; PERINEURAL at 12:54

## 2025-01-30 RX ADMIN — ONDANSETRON 4 MG: 2 INJECTION, SOLUTION INTRAMUSCULAR; INTRAVENOUS at 20:02

## 2025-01-30 RX ADMIN — ROPIVACAINE HYDROCHLORIDE 20 ML: 5 INJECTION, SOLUTION EPIDURAL; INFILTRATION; PERINEURAL at 12:58

## 2025-01-30 RX ADMIN — HYDROMORPHONE HYDROCHLORIDE 0.2 MG: 2 INJECTION, SOLUTION INTRAMUSCULAR; INTRAVENOUS; SUBCUTANEOUS at 16:55

## 2025-01-30 RX ADMIN — METHOCARBAMOL 500 MG: 100 INJECTION INTRAMUSCULAR; INTRAVENOUS at 16:07

## 2025-01-30 RX ADMIN — VANCOMYCIN 2000 MG: 2 INJECTION, SOLUTION INTRAVENOUS at 14:35

## 2025-01-30 RX ADMIN — ROCURONIUM BROMIDE 20 MG: 10 INJECTION, SOLUTION INTRAVENOUS at 15:50

## 2025-01-30 RX ADMIN — ROCURONIUM BROMIDE 60 MG: 10 INJECTION, SOLUTION INTRAVENOUS at 14:29

## 2025-01-30 RX ADMIN — HYDROMORPHONE HYDROCHLORIDE 0.2 MG: 2 INJECTION, SOLUTION INTRAMUSCULAR; INTRAVENOUS; SUBCUTANEOUS at 16:44

## 2025-01-30 RX ADMIN — HYDROMORPHONE HYDROCHLORIDE 0.4 MG: 2 INJECTION, SOLUTION INTRAMUSCULAR; INTRAVENOUS; SUBCUTANEOUS at 16:20

## 2025-01-30 ASSESSMENT — PAIN SCALES - GENERAL
PAINLEVEL_OUTOF10: 0
PAINLEVEL_OUTOF10: 0

## 2025-01-30 NOTE — PROGRESS NOTES
From OR sedated with oral airway in place, dressing with ace wrap CDI, elevated and ice pack applied, VSS.    Report from CRNA and OR RN.    S/P LEFT: SUBTALAR JOINT ARTHRODESIS, POSTERIOR TIBIAL TENDON RECONSTRUCTION, FLEXOR TENDON TRANSFER, GASTROCNEMIUS RECESSION - Left

## 2025-01-30 NOTE — ANESTHESIA PROCEDURE NOTES
Peripheral Block    Patient location during procedure: pre-op  Reason for block: procedure for pain, post-op pain management and at surgeon's request  Start time: 1/30/2025 12:54 PM  End time: 1/30/2025 12:57 PM  Staffing  Performed: anesthesiologist   Anesthesiologist: Oscar Vasquez MD  Performed by: Oscar Vasquez MD  Authorized by: Oscar Vasquez MD    Preanesthetic Checklist  Completed: patient identified, IV checked, site marked, risks and benefits discussed, surgical/procedural consents, equipment checked, pre-op evaluation, timeout performed, anesthesia consent given, oxygen available, monitors applied/VS acknowledged, fire risk safety assessment completed and verbalized and blood product R/B/A discussed and consented  Peripheral Block   Patient position: supine  Prep: ChloraPrep  Provider prep: mask and sterile gloves  Patient monitoring: cardiac monitor, continuous pulse ox, continuous capnometry, frequent blood pressure checks, IV access, responsive to questions and oxygen  Block type: Sciatic  Popliteal  Laterality: left  Injection technique: single-shot  Guidance: ultrasound guided    Needle   Needle type: insulated echogenic nerve stimulator needle   Needle gauge: 20 G  Needle localization: ultrasound guidance  Needle length: 10 cm  Assessment   Injection assessment: negative aspiration for heme, no paresthesia on injection, local visualized surrounding nerve on ultrasound and no intravascular symptoms  Paresthesia pain: none  Slow fractionated injection: yes  Hemodynamics: stable  Outcomes: uncomplicated and patient tolerated procedure well    Additional Notes  20 ml 0.5% ropivacaine injected in 5 ml increments with negative aspiration between. No complications.  Medications Administered  fentaNYL (SUBLIMAZE) injection - IntraVENous   100 mcg - 1/30/2025 12:54:00 PM  midazolam (VERSED) injection 2 mg/2mL - IntraVENous   2 mg - 1/30/2025 12:54:00 PM  ropivacaine (NAROPIN) injection 0.5% -

## 2025-01-30 NOTE — ANESTHESIA PROCEDURE NOTES
Peripheral Block    Patient location during procedure: pre-op  Reason for block: procedure for pain, post-op pain management and at surgeon's request  Start time: 1/30/2025 12:58 PM  End time: 1/30/2025 1:00 PM  Staffing  Performed: anesthesiologist   Anesthesiologist: Oscar Vasquez MD  Performed by: Oscar Vasquez MD  Authorized by: Oscar Vasquez MD    Preanesthetic Checklist  Completed: patient identified, IV checked, site marked, risks and benefits discussed, surgical/procedural consents, equipment checked, pre-op evaluation, timeout performed, anesthesia consent given, oxygen available, monitors applied/VS acknowledged, fire risk safety assessment completed and verbalized and blood product R/B/A discussed and consented  Peripheral Block   Patient position: supine  Prep: ChloraPrep  Provider prep: mask and sterile gloves  Patient monitoring: cardiac monitor, continuous pulse ox, continuous capnometry, frequent blood pressure checks, IV access, oxygen and responsive to questions  Block type: Femoral  Adductor canal  Laterality: left  Injection technique: single-shot  Guidance: ultrasound guided    Needle   Needle type: insulated echogenic nerve stimulator needle   Needle gauge: 20 G  Needle localization: ultrasound guidance  Needle length: 10 cm  Assessment   Injection assessment: negative aspiration for heme, no paresthesia on injection, local visualized surrounding nerve on ultrasound and no intravascular symptoms  Paresthesia pain: none  Slow fractionated injection: yes  Hemodynamics: stable  Outcomes: uncomplicated and patient tolerated procedure well    Additional Notes  20 ml 0.5% ropivacaine injected in 5 ml increments with negative aspiration between. No complications.  Medications Administered  ropivacaine (NAROPIN) injection 0.5% - Perineural   20 mL - 1/30/2025 12:58:00 PM

## 2025-01-30 NOTE — PROGRESS NOTES
Updated son per phone in the waiting area.  He will check their [facility] if medications are ready and he will go and get it and come back. Patient is still sleeping.

## 2025-01-30 NOTE — ANESTHESIA POSTPROCEDURE EVALUATION
Department of Anesthesiology  Postprocedure Note    Patient: Garima Taylor  MRN: [id number]  YOB: 1975  Date of evaluation: 1/30/2025    Procedure Summary       Date: 01/30/25 Room / Location: [place] [address] / [facility]    Anesthesia Start: 1423 Anesthesia Stop: 1707    Procedures:       LEFT: SUBTALAR JOINT ARTHRODESIS, POSTERIOR TIBIAL TENDON RECONSTRUCTION, FLEXOR TENDON TRANSFER, GASTROCNEMIUS RECESSION (Left: Foot)      . (Left: Foot)      . (Left: Foot) Diagnosis:       Osteoarthrosis of ankle and foot, left      Strain of other muscle(s) and tendon(s) at lower leg level, left leg, sequela      Other acquired deformities of left foot      Short Achilles tendon (acquired), left ankle      Sprain of deltoid ligament of left ankle, sequela      (Osteoarthrosis of ankle and foot, left [M19.072])      (Strain of other muscle(s) and tendon(s) at lower leg level, left leg, sequela [S86.812S])      (Other acquired deformities of left foot [M21.6X2])      (Short Achilles tendon (acquired), left ankle [M67.02])      (Sprain of deltoid ligament of left ankle, sequela [S93.422S])    Surgeons: Roland Gillespie MD Responsible Provider: Oscar Vasquez MD    Anesthesia Type: general, regional ASA Status: 3            Anesthesia Type: No value filed.    Weston Phase I: Weston Score: 10    Weston Phase II:      Anesthesia Post Evaluation    Patient location during evaluation: PACU  Patient participation: complete - patient participated  Level of consciousness: awake  Pain score: 2  Airway patency: patent  Cardiovascular status: blood pressure returned to baseline  Respiratory status: acceptable  Hydration status: euvolemic  Pain management: adequate    No notable events documented.

## 2025-01-30 NOTE — ANESTHESIA PRE PROCEDURE
Department of Anesthesiology  Preprocedure Note       Name:  Garima Taylor   Age:  49 y.o.  :  1975                                          MRN:  [id number]         Date:  2025      Surgeon: Surgeon(s):  Roland Gillespie MD    Procedure: Procedure(s):  LEFT: SUBTALAR JOINT ARTHRODESIS, POSTERIOR TIBIAL TENDON RECONSTRUCTION, FLEXOR TENDON TRANSFER, POSSIBLE SPRING LIGAMENT REPAIR, GASTROCNEMIUS RECESSION  .  .    Medications prior to admission:   Prior to Admission medications    Medication Sig Start Date End Date Taking? Authorizing Provider   benzonatate (TESSALON PERLES) 100 MG capsule Take 1-2 capsules by mouth 3 times daily as needed for Cough 25  Yes Rimma Nava MD   VYVANSE 40 MG CAPS Take 40 mg by mouth daily for 30 days. Max Daily Amount: 40 mg 25 Yes Rimma Nava MD   ondansetron (ZOFRAN-ODT) 4 MG disintegrating tablet Take 1 tablet by mouth every 12 hours as needed for Nausea 24  Yes Rimma Nava MD   fluticasone (FLONASE) 50 MCG/ACT nasal spray SPRAY 1 SPRAY INTRANASALLY EVERY DAY AS NEEDED FOR RHINITIS 24  Yes Rimma Nava MD   pantoprazole (PROTONIX) 40 MG tablet TAKE 1 TABLET BY MOUTH EVERY DAY 24  Yes Rimma Nava MD   furosemide (LASIX) 40 MG tablet TAKE 1 TABLET BY MOUTH EVERY DAY AS NEEDED 24  Yes Rimma Nava MD   levocetirizine (XYZAL) 5 MG tablet TAKE 1 TABLET BY MOUTH EVERY DAY IN THE EVENING 10/14/24  Yes Daniel Mar MD   tiZANidine (ZANAFLEX) 2 MG tablet Take  1-2  BID prn leg cramps 24  Yes Rimma Nava MD   rosuvastatin (CRESTOR) 10 MG tablet TAKE 1 TABLET BY MOUTH EVERY DAY 24  Yes Rimma Nava MD   SUMAtriptan (IMITREX) 100 MG tablet Take one with the onset of migraine then may repeat in  2 hours if needed. Max  2 pills/24 hours 24  Yes Rimma Nava MD   albuterol sulfate HFA (VENTOLIN HFA) 108 (90 Base) MCG/ACT inhaler Inhale 2 puffs into the

## 2025-01-30 NOTE — BRIEF OP NOTE
Brief Postoperative Note      Patient: Garima Taylor  YOB: 1975  MRN: [id number]    Date of Procedure: 1/30/2025    Pre-Op Diagnosis Codes:      * Osteoarthrosis of ankle and foot, left [M19.072]     * Strain of other muscle(s) and tendon(s) at lower leg level, left leg, sequela [S86.812S]     * Other acquired deformities of left foot [M21.6X2]     * Short Achilles tendon (acquired), left ankle [M67.02]     * Sprain of deltoid ligament of left ankle, sequela [S93.422S]    Post-Op Diagnosis: Same       Procedure(s):  LEFT: SUBTALAR JOINT ARTHRODESIS, POSTERIOR TIBIAL TENDON RECONSTRUCTION, FLEXOR TENDON TRANSFER, GASTROCNEMIUS RECESSION  .  .    Surgeon(s):  Roland Gillespie MD    Assistant:  Surgical Assistant: Berny Sweeney Alyssa  First Assistant: Kendal Martines PA    Anesthesia: General    Estimated Blood Loss (mL): Minimal    Complications: None    Specimens:   ID Type Source Tests Collected by Time Destination   A : POSTERIOR TENDON STUMP Bone Bone SURGICAL PATHOLOGY Roland Gillespie MD 1/30/2025 1610        Implants:  Implant Name Type Inv. Item Serial No.  Lot No. LRB No. Used Action   GRAFT BNE 5 ML SUBSTITUTE VIABLE OSSIGRAFT - [id number]  GRAFT BNE 5 ML SUBSTITUTE VIABLE OSSIGRAFT  [employer]- [id number] Left 1 Implanted         Drains: * No LDAs found *    Findings:  Infection Present At Time Of Surgery (PATOS) (choose all levels that have infection present):  No infection present  Other Findings: See Above.     Electronically signed by Roland Gillespie MD on 1/30/2025 at 4:32 PM

## 2025-01-30 NOTE — H&P
Date of Surgery Update:  Garima Taylor was seen, history and physical examination reviewed, and patient examined by me today. There have been no significant clinical changes since the completion of the previous history and physical.    The risks, benefits, and alternatives of the proposed procedure have been explained to the patient (or appropriate guardian) and understanding verbalized. All questions answered. Patient wishes to proceed.    Electronically signed by: Roland Gillespie MD,1/30/2025,12:47 PM

## 2025-01-30 NOTE — PROGRESS NOTES
Procedure: peripheral block  MD: Dr. Vasquez  Timeout performed.  Pt monitored closely on heart monitor, 2L NC, continuous pulse oximetry, EtCO2, and frequent BPs.   Pt remained alert and oriented x4. pt tolerated procedure well.

## 2025-01-30 NOTE — DISCHARGE INSTRUCTIONS
[facility], [facility]  [phone]    POSTOPERATIVE INSTRUCTIONS    - For the first 5 Days after your surgery, rest and elevate the surgical area as much as possible.     - Side effects to anesthesia may include nausea, lightheadedness, coughing, sore throat, and/or muscle aches. Rest, fluids, and light foods can help.    Please call our office if:      - Your hand or foot becomes numb, blue, or cold. It is normal to experience numbness 12 to 24 hrs after surgery if you did receive a nerve block.    - You have severe pain or burning which is not relieved with your pain medication.     - Your incision has become reddened or swollen, painful or has excessive bleeding or foul smelling drainage.    - Medications have been called to your pharmacy.  This will be the pharmacy you gave Dr. Gillespie's team before surgery.    Take Xarelto 10 mg daily until instructed to discontinue this medication.  Do not take the Aspirin in addition to this (the Xarelto is in place of the Aspirin).      If your lower extremity was operated on:    Crutches/Roll-about/Wheelchair as needed to aid in ambulation.     You are non-weight bearing on your operative extremity.     You may loosen your Ace wrap: as necessary for pain control    Ok to begin gentle knee range of motion exercises evening of surgery.  Do your exercsies for DVT prevention as described to you by the therapist pre-operatively.    Keep your dressing completely dry.  If your dressings were to get wet, please contact the office.  If you have any questions, please call the office.       Keep your dressing completely dry.  If your dressings were to get wet, please contact the office.  If you have any questions, please call the office.             [facility] AMBULATORY PROCEDURE DISCHARGE INSTRUCTIONS    There are potential side effects of anesthesia or sedation you may experience for the first 24 hours.  These side effects include:    Confusion or Memory loss,  nerve block today from the anesthesiologist. Most nerve blocks last anywhere from 6-36 hours.  You should start taking your pain medication before the block wears off or when you first begin feeling discomfort. It takes at least 30-60 minutes for a pain pill to take effect. Pain medications should be taken with food.   Consider setting an alarm through the night to help manage your pain level so you do not wake up with too much pain.   Pain medicines can cause more sedation and decrease your breathing so ONLY take as directed. If you have Sleep Apnea, you definitely need to use your C-Pap machine.   What to expect after a nerve block:   Numbness, tingling- arm or leg feels heavy or asleep  Weakness or inability to move or control your arm or leg   Inability to feel temperature changes to your arm or leg  Usually the weakness wears off first, followed by the tingling or heaviness. You may notice more pain at this point and should start taking your pain meds.   If you had a shoulder block, you may experience:  Mild shortness of breath (may be relieved by sitting up in a chair or recliner)  Hoarse voice  Blurry vision  Unequal pupils  Drooping of your face (eye or lip) on the same side as the nerve block  Swelling at the injection site on the side of your neck  These side effects should resolve as the block wears off!   IF you have severe or prolonged shortness of breath-  GO to the nearest Emergency Room!   If you had a nerve block of your arm or leg:  Protect the arm or leg from extreme hot or cold temperatures  Protect the arm or leg from obstructing blood flow by frequent position changes- Make sure fingers/ toes stay pink and warm. Call surgeon with any changes  For leg block, get assistance walking until the block wears off, ie:  crutches, walker, support person   If you continue to feel the effects of the nerve block for longer than 72 hours- call Chana Whitfield at [phone] and ask to speak with the

## 2025-01-30 NOTE — PROGRESS NOTES
Starting to wake up more, tolerated ice chips and sips of water and now drinking apple juice, declines any other snacks for now.

## 2025-01-31 NOTE — PROGRESS NOTES
PACU Discharge Note    Current Allergies: Levofloxacin, Bactrim [sulfamethoxazole-trimethoprim], Morphine, and Tamiflu [oseltamivir]    Pt meets criteria for discharge to home per Nayana Score and ASPAN standards.    Discussed with patient and responsible individual receiving instructions how to measure pain per numerical scale and when to contact doctor if prescribed medications are not helping with post operative pain    Discharge instructions reviewed with patient and family.  Both verbalized understanding of instructions. Gave patient and family opportunity to ask questions.  All questions reviewed and answered. Documents signed and copy of discharge instructions given.       Vitals:    01/30/25 1945   BP: 121/81   Pulse: 79   Resp: 19   Temp: 98.3 °F (36.8 °C)   SpO2: 96%      BP within 20% of pt's admitting BP per NAYANA SCORE    In: 2230 [P.O.:250; I.V.:1580]  Out: 30       Pain assessment:    Pain Level: 0    Offered patient opportunity to use restroom prior to discharge. Patient declined use at this time.    Patient discharged to home/self care via wheel chair by transporter/RN with a responsible individual.      1/30/2025 8:31 PM

## 2025-01-31 NOTE — OP NOTE
[address] [address]                            OPERATIVE REPORT      PATIENT NAME: SHARON ROCHA               : 1975  MED REC NO: [id number]                      ROOM: OR  ACCOUNT NO: [id number]                       ADMIT DATE: 2025  PROVIDER: Roland Gillespie MD      DATE OF PROCEDURE:  2025    SURGEON:  Roland Gillespie MD    SECOND SURGEON:  Kendal Martines PA-C    PREOPERATIVE DIAGNOSES:  Left:  1. Posterior tibial tendon tear.  2. Subtalar arthritis/calcaneovalgus.  3. Equinus contracture.    POSTOPERATIVE DIAGNOSES:  Left:  1. Posterior tibial tendon tear.  2. Subtalar arthritis/calcaneovalgus.  3. Equinus contracture.    OPERATIONS:  Left:  1. Posterior tibial tendon reconstruction with flexor digitorum longus tendon transfer.  2. Subtalar joint arthrodesis.  3. Gastrocnemius recession.    ANESTHESIA:  General with block.    INDICATIONS:  This is a 49-year-old woman with a history of significant pain and discomfort in her left ankle.  The patient has continued to have intractable pain despite conservative management.  Preoperative MRI scan showed an avulsion of her posterior tibial tendon with subsequent degenerative changes.  The risks and potential benefits of the procedures were discussed with the patient.  She understands these, was given the opportunity to ask questions.  Her questions were answered to her satisfaction.  She has given consent to proceed with the above-outlined procedures.    OPERATIVE PROCEDURE:  The patient was brought to the operating room, placed in the supine position on the operating table.  After induction of a general anesthetic, the patient's left leg was prepped and draped free in the usual sterile fashion.    A second surgeon was necessary throughout the procedure to aid with appropriate positioning of the patient and positioning of the extremity throughout  muscle was closed with 2-0 PDS suture, the tarsal sinus fat pad closed with 2-0 PDS suture, the subcutaneous tissue reapproximated with 3-0 Vicryl suture, the skin edges reapproximated with staples.    Posterior tibial tendon reconstruction with flexor digitorum longus tendon transfer.  At this point, an incision was made along the medial aspect of the longitudinal arch.  Dissection was carried out through the subcutaneous and deeper tissue and the posterior tibial tendon sheath entered.  Care was taken to identify and protect small branches of the saphenous vein and nerve superiorly, and care was taken to preserve the tibial nerve and posterior tibial artery posteriorly.  The posterior tibial tendon sheath was noted to be empty.  The stump of the avulsed tendon was noted distally and attached to the medial aspect of the navicular.  This was felt to be unrepairable and was excised.  Dissection at this point was carried out proximally to the proximal avulsion stump, which had partially adhered to the posterior tibia and also had retracted further from this.  Degenerative tendon stump was excised.  It was felt this was not a repairable tendon as the avulsion was chronic at this point, and there was essentially no meaningful excursion of the posterior tibial tendon itself.  Decision was therefore made to proceed with tendon transfer.  Inspection of the spring ligament complex, however, revealed this to be intact with no significant degeneration or breakdown.  The flexor digitorum longus tendon was identified and traced to the midportion of the foot as it attached to the knot of Daniel.  The tendon was harvested at this level and the terminal portion of the tendon secured with a 2-0 FiberLoop suture.  Subperiosteal dissection was performed on the medial pole of the navicular, and this was perforated with a K-wire which we used as a guidewire for the Arthrex reamers.  Adequate positioning was achieved in the medial pole

## 2025-02-17 NOTE — TELEPHONE ENCOUNTER
Pls call needs cholesterol checked  Is she needing refill right now?  I would like to see her anyway in a month follow up chronic conditions

## 2025-02-18 RX ORDER — ROSUVASTATIN CALCIUM 10 MG/1
10 TABLET, COATED ORAL DAILY
Qty: 90 TABLET | Refills: 2 | Status: SHIPPED | OUTPATIENT
Start: 2025-02-18

## 2025-02-25 LAB
C-REACTIVE PROTEIN WIDE RANGE: 39 MG/L
ESTIMATED AVERAGE GLUCOSE: 126 MG/DL
HBA1C MFR BLD: 6 % (ref 4.2–5.6)
HCT VFR BLD CALC: 41.1 % (ref 36–46)
HEMOGLOBIN: 13.2 G/DL (ref 12–15.2)
MCH RBC QN AUTO: 27.1 PG (ref 27–33)
MCHC RBC AUTO-ENTMCNC: 32 G/DL (ref 32–36)
MCV RBC AUTO: 84.8 FL (ref 82–97)
PDW BLD-RTO: 13.4 % (ref 12.3–17)
PLATELET # BLD: 349 THOU/MCL (ref 140–375)
PMV BLD AUTO: 7.5 FL (ref 7.4–11.5)
RBC # BLD: 4.85 MIL/MCL (ref 3.8–5.2)
SED RATE, AUTOMATED: 41 MM/HR (ref 0–25)
WBC # BLD: 13.4 THOU/MCL (ref 3.6–10.5)

## 2025-03-19 DIAGNOSIS — F90.2 ATTENTION DEFICIT HYPERACTIVITY DISORDER (ADHD), COMBINED TYPE: ICD-10-CM

## 2025-03-19 RX ORDER — LISDEXAMFETAMINE DIMESYLATE 40 MG/1
40 CAPSULE ORAL DAILY
Qty: 30 CAPSULE | Refills: 0 | Status: SHIPPED | OUTPATIENT
Start: 2025-03-19 | End: 2025-04-18

## 2025-03-28 ENCOUNTER — OFFICE VISIT (OUTPATIENT)
Dept: FAMILY MEDICINE CLINIC | Age: 50
End: 2025-03-28
Payer: COMMERCIAL

## 2025-03-28 ENCOUNTER — RESULTS FOLLOW-UP (OUTPATIENT)
Dept: FAMILY MEDICINE CLINIC | Age: 50
End: 2025-03-28

## 2025-03-28 VITALS
RESPIRATION RATE: 18 BRPM | OXYGEN SATURATION: 98 % | WEIGHT: 280 LBS | SYSTOLIC BLOOD PRESSURE: 118 MMHG | BODY MASS INDEX: 45.19 KG/M2 | DIASTOLIC BLOOD PRESSURE: 68 MMHG | HEART RATE: 84 BPM

## 2025-03-28 DIAGNOSIS — R73.03 PREDIABETES: ICD-10-CM

## 2025-03-28 DIAGNOSIS — R60.0 BILATERAL LOWER EXTREMITY EDEMA: ICD-10-CM

## 2025-03-28 DIAGNOSIS — K21.9 GASTROESOPHAGEAL REFLUX DISEASE WITHOUT ESOPHAGITIS: ICD-10-CM

## 2025-03-28 DIAGNOSIS — F90.9 ATTENTION DEFICIT HYPERACTIVITY DISORDER (ADHD), UNSPECIFIED ADHD TYPE: Primary | ICD-10-CM

## 2025-03-28 LAB
ALBUMIN SERPL-MCNC: 4.7 G/DL (ref 3.4–5)
ANION GAP SERPL CALCULATED.3IONS-SCNC: 14 MMOL/L (ref 3–16)
BUN SERPL-MCNC: 11 MG/DL (ref 7–20)
CALCIUM SERPL-MCNC: 9.8 MG/DL (ref 8.3–10.6)
CHLORIDE SERPL-SCNC: 103 MMOL/L (ref 99–110)
CO2 SERPL-SCNC: 25 MMOL/L (ref 21–32)
CREAT SERPL-MCNC: 0.8 MG/DL (ref 0.6–1.1)
GFR SERPLBLD CREATININE-BSD FMLA CKD-EPI: 90 ML/MIN/{1.73_M2}
GLUCOSE SERPL-MCNC: 110 MG/DL (ref 70–99)
PHOSPHATE SERPL-MCNC: 4 MG/DL (ref 2.5–4.9)
POTASSIUM SERPL-SCNC: 4.1 MMOL/L (ref 3.5–5.1)
SODIUM SERPL-SCNC: 142 MMOL/L (ref 136–145)

## 2025-03-28 PROCEDURE — G2211 COMPLEX E/M VISIT ADD ON: HCPCS | Performed by: INTERNAL MEDICINE

## 2025-03-28 PROCEDURE — 1036F TOBACCO NON-USER: CPT | Performed by: INTERNAL MEDICINE

## 2025-03-28 PROCEDURE — G8417 CALC BMI ABV UP PARAM F/U: HCPCS | Performed by: INTERNAL MEDICINE

## 2025-03-28 PROCEDURE — G8427 DOCREV CUR MEDS BY ELIG CLIN: HCPCS | Performed by: INTERNAL MEDICINE

## 2025-03-28 PROCEDURE — 99214 OFFICE O/P EST MOD 30 MIN: CPT | Performed by: INTERNAL MEDICINE

## 2025-03-28 RX ORDER — GABAPENTIN 100 MG/1
100 CAPSULE ORAL 3 TIMES DAILY
COMMUNITY
Start: 2025-01-21 | End: 2025-04-05

## 2025-03-28 RX ORDER — METFORMIN HYDROCHLORIDE 500 MG/1
TABLET, EXTENDED RELEASE ORAL
Qty: 360 TABLET | Refills: 1 | Status: SHIPPED | OUTPATIENT
Start: 2025-03-28

## 2025-03-28 NOTE — PATIENT INSTRUCTIONS
sergey Notes       2 HM Topics          Component  Ref Range & Units (hover) 2/25/25 1133 11/22/24 1234 5/24/24 1306 1/18/24 0829 10/12/23 1546   Hemoglobin A1C 6.0 High  5.9 R 6.4 R 5.8 R, CM 5.7 R, CM   Estimated Avg Glucose 126   119.8 116.9   Comment: (NOTE)

## 2025-03-28 NOTE — PROGRESS NOTES
Garima Taylor (:  1975) is a 49 y.o. female, here for evaluation of the following chief complaint(s):  Medication Check (Pt is here for a 2 month med f/u )      Assessment & Plan     Assessment/Plan  Garima was seen today for medication check.    Diagnoses and all orders for this visit:    Attention deficit hyperactivity disorder (ADHD), unspecified ADHD type    Bilateral lower extremity edema lasix  -     Renal Function Panel; Future    Prediabetes  -     metFORMIN (GLUCOPHAGE-XR) 500 MG extended release tablet; Take 2 bid    Gastroesophageal reflux disease without esophagitis     On protonix  continue    Orders Placed This Encounter   Medications    metFORMIN (GLUCOPHAGE-XR) 500 MG extended release tablet     Sig: Take 2 bid     Dispense:  360 tablet     Refill:  1     Saxaglyptan was added by endocrine  Lost  18 lbs    Switched  the metformin        Subjective   SUBJECTIVE/OBJECTIVE:  HPI  Had foot surgery  , had foot infection ,  still scabbing  Seeing ortho     Vyvanse  is working  well  Takes even on weekends      On xarelto and gabapentin for the surgery  Only takes tizanidine for mus cramps ,not often has 6 left    Hates the imitrex , makes her too tired and dizzy  Another med worked better    Sometimes gets migraines with work    Due for mammo in April - goes to gyn at [facility]    10 hr at the computer.    Taking lasix  daily    Prediabetic     Hemoglobin A1C (%)   Date Value   2025 6.0 (H)   2024 5.9   2024 6.4       Sodium (mmol/L)   Date Value   2025 141   10/09/2024 140   2024 137     Potassium (mmol/L)   Date Value   2025 3.7   10/09/2024 3.8   2024 4.1     Chloride (mmol/L)   Date Value   2025 101   10/09/2024 101   2024 95 (L)     CO2 (mmol/L)   Date Value   2025 30   10/09/2024 26   2024 29     BUN (mg/dL)   Date Value   2025 11   10/09/2024 13   2024 18     Creatinine (mg/dL)   Date Value   2025 0.8

## 2025-04-01 DIAGNOSIS — R25.2 LEG CRAMPS: ICD-10-CM

## 2025-04-01 RX ORDER — TIZANIDINE 2 MG/1
TABLET ORAL
Qty: 25 TABLET | Refills: 0 | Status: SHIPPED | OUTPATIENT
Start: 2025-04-01

## 2025-04-10 LAB — MAMMOGRAPHY, EXTERNAL: NORMAL

## 2025-04-14 RX ORDER — LEVOCETIRIZINE DIHYDROCHLORIDE 5 MG/1
5 TABLET, FILM COATED ORAL NIGHTLY PRN
Qty: 90 TABLET | Refills: 2 | Status: SHIPPED | OUTPATIENT
Start: 2025-04-14

## 2025-04-17 DIAGNOSIS — F90.2 ATTENTION DEFICIT HYPERACTIVITY DISORDER (ADHD), COMBINED TYPE: ICD-10-CM

## 2025-04-17 RX ORDER — LISDEXAMFETAMINE DIMESYLATE 40 MG/1
40 CAPSULE ORAL DAILY
Qty: 30 CAPSULE | Refills: 0 | Status: SHIPPED | OUTPATIENT
Start: 2025-04-17 | End: 2025-05-17

## 2025-05-08 LAB
BASOPHILS ABSOLUTE: 0.1 THOU/MCL (ref 0–0.2)
BASOPHILS ABSOLUTE: 1 %
C-REACTIVE PROTEIN WIDE RANGE: 23 MG/L
EOSINOPHILS ABSOLUTE: 0.1 THOU/MCL (ref 0.03–0.45)
EOSINOPHILS RELATIVE PERCENT: 1 %
HCT VFR BLD CALC: 39.1 % (ref 36–46)
HEMOGLOBIN: 13 G/DL (ref 12–15.2)
LYMPHOCYTES ABSOLUTE: 3.3 THOU/MCL (ref 1–4)
LYMPHOCYTES RELATIVE PERCENT: 39 %
MCH RBC QN AUTO: 26.7 PG (ref 27–33)
MCHC RBC AUTO-ENTMCNC: 33.2 G/DL (ref 32–36)
MCV RBC AUTO: 80.3 FL (ref 82–97)
MONOCYTES ABSOLUTE: 0.5 THOU/MCL (ref 0.2–0.9)
MONOCYTES RELATIVE PERCENT: 6 %
NEUTROPHILS ABSOLUTE: 4.5 THOU/MCL (ref 1.8–7.7)
PDW BLD-RTO: 15.5 % (ref 12.3–17)
PLATELET # BLD: 225 THOU/MCL (ref 140–375)
PMV BLD AUTO: 7.7 FL (ref 7.4–11.5)
RBC # BLD: 4.88 MIL/MCL (ref 3.8–5.2)
SED RATE, AUTOMATED: 34 MM/HR (ref 0–25)
SEG NEUTROPHILS: 53 %
WBC # BLD: 8.5 THOU/MCL (ref 3.6–10.5)

## 2025-05-14 DIAGNOSIS — R60.0 BILATERAL LOWER EXTREMITY EDEMA: ICD-10-CM

## 2025-05-15 RX ORDER — PANTOPRAZOLE SODIUM 40 MG/1
40 TABLET, DELAYED RELEASE ORAL DAILY
Qty: 90 TABLET | Refills: 1 | Status: SHIPPED | OUTPATIENT
Start: 2025-05-15

## 2025-05-15 RX ORDER — FUROSEMIDE 40 MG/1
40 TABLET ORAL DAILY PRN
Qty: 90 TABLET | Refills: 1 | Status: SHIPPED | OUTPATIENT
Start: 2025-05-15

## 2025-05-23 ENCOUNTER — OFFICE VISIT (OUTPATIENT)
Dept: FAMILY MEDICINE CLINIC | Age: 50
End: 2025-05-23
Payer: COMMERCIAL

## 2025-05-23 VITALS
DIASTOLIC BLOOD PRESSURE: 84 MMHG | HEIGHT: 65 IN | OXYGEN SATURATION: 97 % | RESPIRATION RATE: 12 BRPM | BODY MASS INDEX: 46.48 KG/M2 | WEIGHT: 279 LBS | SYSTOLIC BLOOD PRESSURE: 118 MMHG | TEMPERATURE: 99.5 F | HEART RATE: 104 BPM

## 2025-05-23 DIAGNOSIS — F90.9 ATTENTION DEFICIT HYPERACTIVITY DISORDER (ADHD), UNSPECIFIED ADHD TYPE: ICD-10-CM

## 2025-05-23 DIAGNOSIS — M79.672 LEFT FOOT PAIN: ICD-10-CM

## 2025-05-23 DIAGNOSIS — E28.2 PCOS (POLYCYSTIC OVARIAN SYNDROME): ICD-10-CM

## 2025-05-23 DIAGNOSIS — M85.80 OSTEOPENIA DETERMINED BY X-RAY: ICD-10-CM

## 2025-05-23 DIAGNOSIS — R60.0 BILATERAL LOWER EXTREMITY EDEMA: ICD-10-CM

## 2025-05-23 DIAGNOSIS — K21.9 GASTROESOPHAGEAL REFLUX DISEASE WITHOUT ESOPHAGITIS: ICD-10-CM

## 2025-05-23 DIAGNOSIS — Z01.818 PREOP EXAMINATION: Primary | ICD-10-CM

## 2025-05-23 DIAGNOSIS — J45.990 EXERCISE-INDUCED ASTHMA: ICD-10-CM

## 2025-05-23 PROCEDURE — G8427 DOCREV CUR MEDS BY ELIG CLIN: HCPCS | Performed by: FAMILY MEDICINE

## 2025-05-23 PROCEDURE — G8417 CALC BMI ABV UP PARAM F/U: HCPCS | Performed by: FAMILY MEDICINE

## 2025-05-23 PROCEDURE — 1036F TOBACCO NON-USER: CPT | Performed by: FAMILY MEDICINE

## 2025-05-23 PROCEDURE — 99214 OFFICE O/P EST MOD 30 MIN: CPT | Performed by: FAMILY MEDICINE

## 2025-05-23 RX ORDER — TIZANIDINE 2 MG/1
TABLET ORAL
Qty: 25 TABLET | Refills: 0 | Status: SHIPPED | OUTPATIENT
Start: 2025-05-23

## 2025-05-23 RX ORDER — SAXAGLIPTIN 5 MG/1
5 TABLET, FILM COATED ORAL DAILY
COMMUNITY
Start: 2025-05-23

## 2025-05-23 RX ORDER — GABAPENTIN 100 MG/1
100 CAPSULE ORAL 3 TIMES DAILY
Qty: 90 CAPSULE | Refills: 2 | Status: CANCELLED | OUTPATIENT
Start: 2025-05-23 | End: 2025-08-05

## 2025-05-23 ASSESSMENT — ENCOUNTER SYMPTOMS
RESPIRATORY NEGATIVE: 1
EYES NEGATIVE: 1
ALLERGIC/IMMUNOLOGIC NEGATIVE: 1
GASTROINTESTINAL NEGATIVE: 1

## 2025-05-23 NOTE — PROGRESS NOTES
Neck range of motion: normal   Dentition: No chipped, loose, or missing teeth.    Cardiographics  ECG: No prior ECG  Echocardiogram: not done    Lab Review   Orders Only on 05/08/2025   Component Date Value    wr-CRP 05/08/2025 23 (H)     WBC 05/08/2025 8.5     RBC 05/08/2025 4.88     Hemoglobin 05/08/2025 13.0     Hematocrit 05/08/2025 39.1     MCV 05/08/2025 80.3 (L)     MCH 05/08/2025 26.7 (L)     MCHC 05/08/2025 33.2     RDW 05/08/2025 15.5     Platelets 05/08/2025 225     MPV 05/08/2025 7.7     Neutrophils Absolute 05/08/2025 4.50     Lymphocytes Absolute 05/08/2025 3.30     Monocytes Absolute 05/08/2025 0.50     Eosinophils Absolute 05/08/2025 0.10     Basophils Absolute 05/08/2025 0.10     Seg Neutrophils 05/08/2025 53     Lymphocytes % 05/08/2025 39     Monocytes % 05/08/2025 6     Eosinophils % 05/08/2025 1     Basophils Absolute 05/08/2025 1     Sed Rate, Automated 05/08/2025 34 (H)    Orders Only on 03/28/2025   Component Date Value    Sodium 03/28/2025 142     Potassium 03/28/2025 4.1     Chloride 03/28/2025 103     CO2 03/28/2025 25     Anion Gap 03/28/2025 14     Glucose 03/28/2025 110 (H)     BUN 03/28/2025 11     Creatinine 03/28/2025 0.8     Est, Glom Filt Rate 03/28/2025 90     Calcium 03/28/2025 9.8     Phosphorus 03/28/2025 4.0     Albumin 03/28/2025 4.7           Assessment:     49 y.o. y.o. female with planned surgery as above.       Difficulty with intubation is anticipated: only due to BMI and neck thickness. But she says there was no problem with prior surgeries.    Assessment & Plan  Preop examination   - No medical contraindication to surgery.          Left foot pain   - proceed with plan to remove hardware due to obvious inflammatory response. Additional eval for infection to be done at the time of surgery.  - she does have some issues with anesthesia and she will discuss that with the anesthesiologist.    Orders:    Basic Metabolic Panel; Future    CBC with Auto Differential;

## 2025-05-23 NOTE — ASSESSMENT & PLAN NOTE
- OK to just hold metformin and the Onglyza the AM of surgery- restart after she is eating normally

## 2025-05-23 NOTE — PATIENT INSTRUCTIONS
Take albuterol with  you to the surgery center.  Zofran also.    On the AM of surgery, you may take these medicines:  Metoprolol  Gabapentin  Crestor     Ok to hold all other meds the AM of surgery.  OK to resume them after surgery.

## 2025-05-24 LAB
ANION GAP SERPL CALCULATED.3IONS-SCNC: 14 MMOL/L (ref 3–16)
BASOPHILS # BLD: 0.1 K/UL (ref 0–0.2)
BASOPHILS NFR BLD: 0.9 %
BUN SERPL-MCNC: 14 MG/DL (ref 7–20)
CALCIUM SERPL-MCNC: 9.8 MG/DL (ref 8.3–10.6)
CHLORIDE SERPL-SCNC: 100 MMOL/L (ref 99–110)
CO2 SERPL-SCNC: 28 MMOL/L (ref 21–32)
CREAT SERPL-MCNC: 0.9 MG/DL (ref 0.6–1.1)
DEPRECATED RDW RBC AUTO: 15.5 % (ref 12.4–15.4)
EOSINOPHIL # BLD: 0.1 K/UL (ref 0–0.6)
EOSINOPHIL NFR BLD: 0.9 %
GFR SERPLBLD CREATININE-BSD FMLA CKD-EPI: 78 ML/MIN/{1.73_M2}
GLUCOSE SERPL-MCNC: 88 MG/DL (ref 70–99)
HCT VFR BLD AUTO: 40.6 % (ref 36–48)
HGB BLD-MCNC: 13.2 G/DL (ref 12–16)
LYMPHOCYTES # BLD: 2.9 K/UL (ref 1–5.1)
LYMPHOCYTES NFR BLD: 25.1 %
MCH RBC QN AUTO: 26.4 PG (ref 26–34)
MCHC RBC AUTO-ENTMCNC: 32.6 G/DL (ref 31–36)
MCV RBC AUTO: 81 FL (ref 80–100)
MONOCYTES # BLD: 0.9 K/UL (ref 0–1.3)
MONOCYTES NFR BLD: 7.5 %
NEUTROPHILS # BLD: 7.6 K/UL (ref 1.7–7.7)
NEUTROPHILS NFR BLD: 65.6 %
PLATELET # BLD AUTO: 259 K/UL (ref 135–450)
PMV BLD AUTO: 8.6 FL (ref 5–10.5)
POTASSIUM SERPL-SCNC: 4.1 MMOL/L (ref 3.5–5.1)
RBC # BLD AUTO: 5.02 M/UL (ref 4–5.2)
SODIUM SERPL-SCNC: 142 MMOL/L (ref 136–145)
WBC # BLD AUTO: 11.6 K/UL (ref 4–11)

## 2025-05-25 DIAGNOSIS — F90.2 ATTENTION DEFICIT HYPERACTIVITY DISORDER (ADHD), COMBINED TYPE: ICD-10-CM

## 2025-05-26 ENCOUNTER — RESULTS FOLLOW-UP (OUTPATIENT)
Dept: FAMILY MEDICINE CLINIC | Age: 50
End: 2025-05-26

## 2025-05-27 RX ORDER — LISDEXAMFETAMINE DIMESYLATE 40 MG/1
40 CAPSULE ORAL DAILY
Qty: 30 CAPSULE | Refills: 0 | Status: SHIPPED | OUTPATIENT
Start: 2025-05-27 | End: 2025-06-26

## 2025-05-27 RX ORDER — SODIUM CHLORIDE, SODIUM LACTATE, POTASSIUM CHLORIDE, CALCIUM CHLORIDE 600; 310; 30; 20 MG/100ML; MG/100ML; MG/100ML; MG/100ML
INJECTION, SOLUTION INTRAVENOUS CONTINUOUS
Status: CANCELLED | OUTPATIENT
Start: 2025-05-27 | Stop reason: SDUPTHER

## 2025-05-27 RX ORDER — ACETAMINOPHEN AND CODEINE PHOSPHATE 300; 30 MG/1; MG/1
TABLET ORAL
Status: ON HOLD | COMMUNITY
Start: 2025-02-18 | End: 2025-05-30 | Stop reason: HOSPADM

## 2025-05-27 RX ORDER — SAXAGLIPTIN 5 MG/1
5 TABLET, FILM COATED ORAL DAILY
COMMUNITY
Start: 2025-01-16

## 2025-05-27 RX ORDER — ASPIRIN 325 MG
325 TABLET ORAL DAILY
Status: ON HOLD | COMMUNITY
Start: 2025-05-19 | End: 2025-05-30

## 2025-05-27 NOTE — PROGRESS NOTES
5/27/2025 1121 AM:    PRE-OP INSTRUCTIONS FOR SURGICAL PATIENTS          Our Pre-admission Testing Nurses tried and were unable to reach you today.  Please read the attached instructions AND listen to your voicemail. PLEASE CALL [phone].    Follow all instructions provided to you from your surgeon's office, including your ARRIVAL TIME.   Arrange for someone to drive you home and be with you for the first 24 hours after discharge.     NOTE: at this time ONLY 2 ADULTS may accompany you. NO CHILDREN UNDER THE AGE OF 16.    One person encouraged to stay at hospital entire time if outpatient surgery  Enter the MAIN entrance located on [address] and report to the surgical desk on the LEFT side of the lobby. Please park in the parking garage or there is free  Parking available after 7am for your use.    Bring your insurance card & photo ID with you to register.  Bring your medication list with you with dose and frequency listed (including over the counter medications)  Contact your ordering physician/surgeon for medication instructions as soon as possible, especially if taking blood thinners, aspirin, heart, or diabetic medication.   STOP VITAMINS/SUPPLEMENTS/NON-STEROIDAL ANTI-INFLAMMATORY MEDICATIONS 7 DAYS PRIOR TO PROCEDURE.   Bariatric surgical patients need to call your surgeon if on diabetic medications (as some may need to be stopped 1-week preop)  A Pre-Surgical History and Physical MUST be completed WITHIN 30 DAYS OR LESS prior to your procedure by your Physician or an Urgent Care.  DO NOT EAT ANYTHING 8 hours prior to arrival for surgery.  You may have sips of WATER ONLY (up to 8 ounces) 4 hours prior to your arrival for surgery. Then nothing further 4 hours prior to arriving at hospital.   NOTE: ALL Gastric, Bariatric & Bowel surgery patients - you MUST follow your surgeon's instructions regarding eating/drinking as you will have very specific instructions to follow.  If you did not receive

## 2025-05-27 NOTE — PROGRESS NOTES
5/27/2025 1119 AM:    LMOR W/INSTRUCTIONS AND SENT TO EMAIL ON FILE AND LMOR REQUESTING PT TO RETURN CALL TO [facility] PAT/TS    H&P IN [facility]  NOTES 5/23/25 AND LABS IN EPIC 5/8/2025 AND 5/23/2025/TS    CHARTED IN H&P NOTE \"Difficulty with intubation is anticipated: only due to BMI and neck thickness. But she says there was no problem with prior surgeries. AND \"does have some issues with anesthesia and she will discuss that with the anesthesiologist. does have some issues with anesthesia and she will discuss that with the anesthesiologist.\" /TS    CARDIAC CLEARANCE IN MEDIA 5/16/2025 W/ORDER/TS    Notified DR MARCUM'S OFFICE MRSA SWAB ENTERED FOR DOS/TS    5/27/2025 1230 pm:    Pt returned call and TI COMPLETED/TS    VERBALIZED QUESTIONS CONCERNING USE OF ANTIBIOTIC PRE VS POST PROCEDURE AND INSTRUCTED PT TO CONTACT DR MARCUM'S OFFICE TO DISCUSS CONCERNS AND NOTIFY DR MARCUM'S OFFICE WITH ANY CHANGE IN HEALTH/TS

## 2025-05-27 NOTE — PROGRESS NOTES
5/27/2025 1231 PM:        [facility] PRE-SURGICAL TESTING INSTRUCTIONS                      PRIOR TO PROCEDURE DATE:    1. PLEASE FOLLOW ANY INSTRUCTIONS GIVEN TO YOU PER YOUR SURGEON, INCLUDING ARRIVAL TIME.      2. Arrange for someone to drive you home and be with you for the first 24 hours after discharge for your safety after your procedure for which you received sedation. Ensure it is someone we can share information with regarding your discharge. Contact surgeon's office for arrival/procedure time.     NOTE: At this time ONLY 2 ADULTS may accompany you. NO CHILDREN UNDER AGE OF 16.    One person ENCOURAGED to stay at hospital entire time if outpatient surgery      3. You must contact your surgeon for instructions IF:  You are taking any blood thinners, aspirin, anti-inflammatory or vitamins.   Contact your ordering physician/surgeon for prescription medication instructions as soon as possible, especially if taking blood thinners, aspirin, heart, or diabetic medication.   There is a change in your physical condition such as a cold, fever, rash, cuts, sores, or any other infection, especially near your surgical site.    4. Do not drink alcohol the day before or day of your procedure.  Do not use any marijuana at least 24 hours or street drugs (heroin, cocaine) at minimum 5 days prior to your procedure.     5. A Pre-Surgical History and Physical MUST be completed WITHIN 30 DAYS OR LESS prior to your procedure.by your Physician or an Urgent Care        THE DAY OF YOUR PROCEDURE:  1.  Follow instructions for ARRIVAL TIME as DIRECTED BY YOUR SURGEON. [address] [address]     2. Enter the MAIN entrance from [facility] and follow the signs to the free Parking Garage or  Parking (offered free of charge 7 am-5pm).      3. Enter the Main Entrance of the hospital (do not enter from the lower level of the parking garage). Upon entrance, check in with the

## 2025-05-29 ENCOUNTER — HOSPITAL ENCOUNTER (INPATIENT)
Age: 50
LOS: 3 days | Discharge: HOME | DRG: 496 | End: 2025-06-01
Attending: ORTHOPAEDIC SURGERY | Admitting: ORTHOPAEDIC SURGERY
Payer: COMMERCIAL

## 2025-05-29 ENCOUNTER — ANESTHESIA (OUTPATIENT)
Dept: OPERATING ROOM | Age: 50
End: 2025-05-29
Payer: COMMERCIAL

## 2025-05-29 ENCOUNTER — ANESTHESIA EVENT (OUTPATIENT)
Dept: OPERATING ROOM | Age: 50
End: 2025-05-29
Payer: COMMERCIAL

## 2025-05-29 DIAGNOSIS — T84.293S: ICD-10-CM

## 2025-05-29 DIAGNOSIS — M86.672 CHRONIC OSTEOMYELITIS OF LEFT FOOT (HCC): ICD-10-CM

## 2025-05-29 DIAGNOSIS — M85.80 OSTEOPENIA DETERMINED BY X-RAY: Primary | ICD-10-CM

## 2025-05-29 LAB
GLUCOSE BLD-MCNC: 99 MG/DL (ref 70–99)
HCG UR QL: NEGATIVE
LOEFFLER MB STN SPEC: NORMAL
LOEFFLER MB STN SPEC: NORMAL
PERFORMED ON: NORMAL

## 2025-05-29 PROCEDURE — 0QPM04Z REMOVAL OF INTERNAL FIXATION DEVICE FROM LEFT TARSAL, OPEN APPROACH: ICD-10-PCS | Performed by: ORTHOPAEDIC SURGERY

## 2025-05-29 PROCEDURE — 7100000001 HC PACU RECOVERY - ADDTL 15 MIN: Performed by: ORTHOPAEDIC SURGERY

## 2025-05-29 PROCEDURE — 87205 SMEAR GRAM STAIN: CPT

## 2025-05-29 PROCEDURE — 6360000002 HC RX W HCPCS: Performed by: ANESTHESIOLOGY

## 2025-05-29 PROCEDURE — 2580000003 HC RX 258: Performed by: ORTHOPAEDIC SURGERY

## 2025-05-29 PROCEDURE — 64445 NJX AA&/STRD SCIATIC NRV IMG: CPT | Performed by: ANESTHESIOLOGY

## 2025-05-29 PROCEDURE — 87206 SMEAR FLUORESCENT/ACID STAI: CPT

## 2025-05-29 PROCEDURE — 3600000014 HC SURGERY LEVEL 4 ADDTL 15MIN: Performed by: ORTHOPAEDIC SURGERY

## 2025-05-29 PROCEDURE — 84703 CHORIONIC GONADOTROPIN ASSAY: CPT

## 2025-05-29 PROCEDURE — 2580000003 HC RX 258: Performed by: INTERNAL MEDICINE

## 2025-05-29 PROCEDURE — 2709999900 HC NON-CHARGEABLE SUPPLY: Performed by: ORTHOPAEDIC SURGERY

## 2025-05-29 PROCEDURE — 6370000000 HC RX 637 (ALT 250 FOR IP): Performed by: ANESTHESIOLOGY

## 2025-05-29 PROCEDURE — 2500000003 HC RX 250 WO HCPCS: Performed by: ORTHOPAEDIC SURGERY

## 2025-05-29 PROCEDURE — 87070 CULTURE OTHR SPECIMN AEROBIC: CPT

## 2025-05-29 PROCEDURE — 3700000000 HC ANESTHESIA ATTENDED CARE: Performed by: ORTHOPAEDIC SURGERY

## 2025-05-29 PROCEDURE — 87116 MYCOBACTERIA CULTURE: CPT

## 2025-05-29 PROCEDURE — 3700000001 HC ADD 15 MINUTES (ANESTHESIA): Performed by: ORTHOPAEDIC SURGERY

## 2025-05-29 PROCEDURE — 99223 1ST HOSP IP/OBS HIGH 75: CPT | Performed by: INTERNAL MEDICINE

## 2025-05-29 PROCEDURE — 2580000003 HC RX 258: Performed by: ANESTHESIOLOGY

## 2025-05-29 PROCEDURE — 2500000003 HC RX 250 WO HCPCS: Performed by: ANESTHESIOLOGY

## 2025-05-29 PROCEDURE — 6360000002 HC RX W HCPCS: Performed by: NURSE ANESTHETIST, CERTIFIED REGISTERED

## 2025-05-29 PROCEDURE — 6360000002 HC RX W HCPCS: Performed by: INTERNAL MEDICINE

## 2025-05-29 PROCEDURE — 6370000000 HC RX 637 (ALT 250 FOR IP): Performed by: ORTHOPAEDIC SURGERY

## 2025-05-29 PROCEDURE — 3600000004 HC SURGERY LEVEL 4 BASE: Performed by: ORTHOPAEDIC SURGERY

## 2025-05-29 PROCEDURE — 87077 CULTURE AEROBIC IDENTIFY: CPT

## 2025-05-29 PROCEDURE — 87186 SC STD MICRODIL/AGAR DIL: CPT

## 2025-05-29 PROCEDURE — 87102 FUNGUS ISOLATION CULTURE: CPT

## 2025-05-29 PROCEDURE — 2500000003 HC RX 250 WO HCPCS: Performed by: NURSE ANESTHETIST, CERTIFIED REGISTERED

## 2025-05-29 PROCEDURE — 1200000000 HC SEMI PRIVATE

## 2025-05-29 PROCEDURE — 87641 MR-STAPH DNA AMP PROBE: CPT

## 2025-05-29 PROCEDURE — 6360000002 HC RX W HCPCS: Performed by: ORTHOPAEDIC SURGERY

## 2025-05-29 PROCEDURE — 7100000000 HC PACU RECOVERY - FIRST 15 MIN: Performed by: ORTHOPAEDIC SURGERY

## 2025-05-29 PROCEDURE — 87075 CULTR BACTERIA EXCEPT BLOOD: CPT

## 2025-05-29 RX ORDER — BUTALBITAL, ACETAMINOPHEN AND CAFFEINE 50; 325; 40 MG/1; MG/1; MG/1
1 TABLET ORAL ONCE
Status: COMPLETED | OUTPATIENT
Start: 2025-05-29 | End: 2025-05-29

## 2025-05-29 RX ORDER — SODIUM CHLORIDE 9 MG/ML
INJECTION, SOLUTION INTRAVENOUS CONTINUOUS
Status: DISCONTINUED | OUTPATIENT
Start: 2025-05-29 | End: 2025-06-01 | Stop reason: HOSPADM

## 2025-05-29 RX ORDER — SODIUM CHLORIDE 9 MG/ML
INJECTION, SOLUTION INTRAVENOUS CONTINUOUS
Status: DISCONTINUED | OUTPATIENT
Start: 2025-05-29 | End: 2025-05-29 | Stop reason: HOSPADM

## 2025-05-29 RX ORDER — PANTOPRAZOLE SODIUM 40 MG/1
40 TABLET, DELAYED RELEASE ORAL DAILY
Status: DISCONTINUED | OUTPATIENT
Start: 2025-05-29 | End: 2025-06-01 | Stop reason: HOSPADM

## 2025-05-29 RX ORDER — FENTANYL CITRATE 50 UG/ML
INJECTION, SOLUTION INTRAMUSCULAR; INTRAVENOUS
Status: DISCONTINUED | OUTPATIENT
Start: 2025-05-29 | End: 2025-05-29 | Stop reason: SDUPTHER

## 2025-05-29 RX ORDER — LABETALOL HYDROCHLORIDE 5 MG/ML
10 INJECTION, SOLUTION INTRAVENOUS
Status: DISCONTINUED | OUTPATIENT
Start: 2025-05-29 | End: 2025-05-29 | Stop reason: HOSPADM

## 2025-05-29 RX ORDER — ONDANSETRON 4 MG/1
4 TABLET, ORALLY DISINTEGRATING ORAL EVERY 8 HOURS PRN
Status: DISCONTINUED | OUTPATIENT
Start: 2025-05-29 | End: 2025-06-01 | Stop reason: HOSPADM

## 2025-05-29 RX ORDER — GABAPENTIN 100 MG/1
100 CAPSULE ORAL 2 TIMES DAILY
Status: DISCONTINUED | OUTPATIENT
Start: 2025-05-29 | End: 2025-06-01 | Stop reason: HOSPADM

## 2025-05-29 RX ORDER — METOPROLOL SUCCINATE 50 MG/1
50 TABLET, EXTENDED RELEASE ORAL DAILY
Status: DISCONTINUED | OUTPATIENT
Start: 2025-05-30 | End: 2025-06-01 | Stop reason: HOSPADM

## 2025-05-29 RX ORDER — FUROSEMIDE 40 MG/1
40 TABLET ORAL DAILY PRN
Status: DISCONTINUED | OUTPATIENT
Start: 2025-05-29 | End: 2025-06-01 | Stop reason: HOSPADM

## 2025-05-29 RX ORDER — SODIUM CHLORIDE 0.9 % (FLUSH) 0.9 %
5-40 SYRINGE (ML) INJECTION EVERY 12 HOURS SCHEDULED
Status: DISCONTINUED | OUTPATIENT
Start: 2025-05-29 | End: 2025-06-01 | Stop reason: HOSPADM

## 2025-05-29 RX ORDER — DEXAMETHASONE SODIUM PHOSPHATE 4 MG/ML
INJECTION, SOLUTION INTRA-ARTICULAR; INTRALESIONAL; INTRAMUSCULAR; INTRAVENOUS; SOFT TISSUE
Status: DISCONTINUED | OUTPATIENT
Start: 2025-05-29 | End: 2025-05-29 | Stop reason: SDUPTHER

## 2025-05-29 RX ORDER — FENTANYL CITRATE 50 UG/ML
25 INJECTION, SOLUTION INTRAMUSCULAR; INTRAVENOUS EVERY 5 MIN PRN
Status: DISCONTINUED | OUTPATIENT
Start: 2025-05-29 | End: 2025-05-29 | Stop reason: HOSPADM

## 2025-05-29 RX ORDER — SODIUM CHLORIDE, SODIUM LACTATE, POTASSIUM CHLORIDE, CALCIUM CHLORIDE 600; 310; 30; 20 MG/100ML; MG/100ML; MG/100ML; MG/100ML
INJECTION, SOLUTION INTRAVENOUS CONTINUOUS
Status: DISCONTINUED | OUTPATIENT
Start: 2025-05-29 | End: 2025-05-29 | Stop reason: HOSPADM

## 2025-05-29 RX ORDER — SODIUM CHLORIDE 0.9 % (FLUSH) 0.9 %
5-40 SYRINGE (ML) INJECTION PRN
Status: DISCONTINUED | OUTPATIENT
Start: 2025-05-29 | End: 2025-05-29 | Stop reason: HOSPADM

## 2025-05-29 RX ORDER — HYDROMORPHONE HYDROCHLORIDE 1 MG/ML
0.25 INJECTION, SOLUTION INTRAMUSCULAR; INTRAVENOUS; SUBCUTANEOUS
Status: DISCONTINUED | OUTPATIENT
Start: 2025-05-29 | End: 2025-06-01 | Stop reason: HOSPADM

## 2025-05-29 RX ORDER — PROPOFOL 10 MG/ML
INJECTION, EMULSION INTRAVENOUS
Status: DISCONTINUED | OUTPATIENT
Start: 2025-05-29 | End: 2025-05-29 | Stop reason: SDUPTHER

## 2025-05-29 RX ORDER — FLUTICASONE PROPIONATE 50 MCG
1 SPRAY, SUSPENSION (ML) NASAL DAILY PRN
Status: DISCONTINUED | OUTPATIENT
Start: 2025-05-29 | End: 2025-06-01 | Stop reason: HOSPADM

## 2025-05-29 RX ORDER — CLINDAMYCIN PHOSPHATE 900 MG/50ML
INJECTION, SOLUTION INTRAVENOUS
Status: DISCONTINUED | OUTPATIENT
Start: 2025-05-29 | End: 2025-05-29 | Stop reason: SDUPTHER

## 2025-05-29 RX ORDER — ONDANSETRON 2 MG/ML
4 INJECTION INTRAMUSCULAR; INTRAVENOUS
Status: COMPLETED | OUTPATIENT
Start: 2025-05-29 | End: 2025-05-29

## 2025-05-29 RX ORDER — LISDEXAMFETAMINE DIMESYLATE 40 MG/1
40 CAPSULE ORAL DAILY
Status: DISCONTINUED | OUTPATIENT
Start: 2025-05-29 | End: 2025-06-01 | Stop reason: HOSPADM

## 2025-05-29 RX ORDER — METOPROLOL TARTRATE 50 MG
50 TABLET ORAL 2 TIMES DAILY
Status: DISCONTINUED | OUTPATIENT
Start: 2025-05-29 | End: 2025-05-29

## 2025-05-29 RX ORDER — HYDROMORPHONE HYDROCHLORIDE 1 MG/ML
0.5 INJECTION, SOLUTION INTRAMUSCULAR; INTRAVENOUS; SUBCUTANEOUS EVERY 5 MIN PRN
Status: DISCONTINUED | OUTPATIENT
Start: 2025-05-29 | End: 2025-05-29 | Stop reason: HOSPADM

## 2025-05-29 RX ORDER — SODIUM CHLORIDE 0.9 % (FLUSH) 0.9 %
5-40 SYRINGE (ML) INJECTION PRN
Status: DISCONTINUED | OUTPATIENT
Start: 2025-05-29 | End: 2025-06-01 | Stop reason: HOSPADM

## 2025-05-29 RX ORDER — IPRATROPIUM BROMIDE AND ALBUTEROL SULFATE 2.5; .5 MG/3ML; MG/3ML
1 SOLUTION RESPIRATORY (INHALATION)
Status: DISCONTINUED | OUTPATIENT
Start: 2025-05-29 | End: 2025-05-29 | Stop reason: HOSPADM

## 2025-05-29 RX ORDER — LIDOCAINE HYDROCHLORIDE 10 MG/ML
50 INJECTION, SOLUTION EPIDURAL; INFILTRATION; INTRACAUDAL; PERINEURAL ONCE
Status: COMPLETED | OUTPATIENT
Start: 2025-05-29 | End: 2025-05-30

## 2025-05-29 RX ORDER — MAGNESIUM HYDROXIDE 1200 MG/15ML
LIQUID ORAL CONTINUOUS PRN
Status: COMPLETED | OUTPATIENT
Start: 2025-05-29 | End: 2025-05-29

## 2025-05-29 RX ORDER — HEPARIN SODIUM 5000 [USP'U]/ML
5000 INJECTION, SOLUTION INTRAVENOUS; SUBCUTANEOUS 2 TIMES DAILY
Status: DISCONTINUED | OUTPATIENT
Start: 2025-05-30 | End: 2025-06-01 | Stop reason: HOSPADM

## 2025-05-29 RX ORDER — ALOGLIPTIN 25 MG/1
25 TABLET, FILM COATED ORAL DAILY
Status: DISCONTINUED | OUTPATIENT
Start: 2025-05-29 | End: 2025-06-01 | Stop reason: HOSPADM

## 2025-05-29 RX ORDER — SODIUM CHLORIDE 0.9 % (FLUSH) 0.9 %
5-40 SYRINGE (ML) INJECTION EVERY 12 HOURS SCHEDULED
Status: DISCONTINUED | OUTPATIENT
Start: 2025-05-29 | End: 2025-05-29 | Stop reason: HOSPADM

## 2025-05-29 RX ORDER — CETIRIZINE HYDROCHLORIDE 10 MG/1
10 TABLET ORAL DAILY
Status: DISCONTINUED | OUTPATIENT
Start: 2025-05-29 | End: 2025-06-01 | Stop reason: HOSPADM

## 2025-05-29 RX ORDER — SODIUM CHLORIDE 9 MG/ML
INJECTION, SOLUTION INTRAVENOUS PRN
Status: DISCONTINUED | OUTPATIENT
Start: 2025-05-29 | End: 2025-06-01 | Stop reason: HOSPADM

## 2025-05-29 RX ORDER — PROCHLORPERAZINE EDISYLATE 5 MG/ML
5 INJECTION INTRAMUSCULAR; INTRAVENOUS
Status: DISCONTINUED | OUTPATIENT
Start: 2025-05-29 | End: 2025-05-29 | Stop reason: HOSPADM

## 2025-05-29 RX ORDER — ROPIVACAINE HYDROCHLORIDE 5 MG/ML
INJECTION, SOLUTION EPIDURAL; INFILTRATION; PERINEURAL
Status: DISCONTINUED | OUTPATIENT
Start: 2025-05-29 | End: 2025-05-29 | Stop reason: SDUPTHER

## 2025-05-29 RX ORDER — NALOXONE HYDROCHLORIDE 0.4 MG/ML
INJECTION, SOLUTION INTRAMUSCULAR; INTRAVENOUS; SUBCUTANEOUS PRN
Status: DISCONTINUED | OUTPATIENT
Start: 2025-05-29 | End: 2025-05-29 | Stop reason: HOSPADM

## 2025-05-29 RX ORDER — ROPIVACAINE HYDROCHLORIDE 5 MG/ML
INJECTION, SOLUTION EPIDURAL; INFILTRATION; PERINEURAL
Status: COMPLETED
Start: 2025-05-29 | End: 2025-05-29

## 2025-05-29 RX ORDER — ACETAMINOPHEN 325 MG/1
650 TABLET ORAL EVERY 6 HOURS PRN
Status: ON HOLD | COMMUNITY
End: 2025-05-29 | Stop reason: HOSPADM

## 2025-05-29 RX ORDER — ROSUVASTATIN CALCIUM 20 MG/1
10 TABLET, COATED ORAL DAILY
Status: DISCONTINUED | OUTPATIENT
Start: 2025-05-29 | End: 2025-06-01 | Stop reason: HOSPADM

## 2025-05-29 RX ORDER — HYDROCODONE BITARTRATE AND ACETAMINOPHEN 5; 325 MG/1; MG/1
1 TABLET ORAL EVERY 6 HOURS PRN
Status: DISCONTINUED | OUTPATIENT
Start: 2025-05-29 | End: 2025-06-01 | Stop reason: HOSPADM

## 2025-05-29 RX ORDER — ONDANSETRON 2 MG/ML
INJECTION INTRAMUSCULAR; INTRAVENOUS
Status: DISCONTINUED | OUTPATIENT
Start: 2025-05-29 | End: 2025-05-29 | Stop reason: SDUPTHER

## 2025-05-29 RX ORDER — HYDROMORPHONE HYDROCHLORIDE 1 MG/ML
0.5 INJECTION, SOLUTION INTRAMUSCULAR; INTRAVENOUS; SUBCUTANEOUS
Status: DISCONTINUED | OUTPATIENT
Start: 2025-05-29 | End: 2025-06-01 | Stop reason: HOSPADM

## 2025-05-29 RX ORDER — ACETAMINOPHEN 325 MG/1
650 TABLET ORAL EVERY 6 HOURS PRN
Status: DISCONTINUED | OUTPATIENT
Start: 2025-05-29 | End: 2025-06-01 | Stop reason: HOSPADM

## 2025-05-29 RX ORDER — METFORMIN HYDROCHLORIDE 500 MG/1
1000 TABLET, EXTENDED RELEASE ORAL 2 TIMES DAILY WITH MEALS
Status: DISCONTINUED | OUTPATIENT
Start: 2025-05-29 | End: 2025-06-01 | Stop reason: HOSPADM

## 2025-05-29 RX ORDER — SUCCINYLCHOLINE/SOD CL,ISO/PF 200MG/10ML
SYRINGE (ML) INTRAVENOUS
Status: DISCONTINUED | OUTPATIENT
Start: 2025-05-29 | End: 2025-05-29 | Stop reason: SDUPTHER

## 2025-05-29 RX ORDER — LIDOCAINE HYDROCHLORIDE 20 MG/ML
INJECTION, SOLUTION INTRAVENOUS
Status: DISCONTINUED | OUTPATIENT
Start: 2025-05-29 | End: 2025-05-29 | Stop reason: SDUPTHER

## 2025-05-29 RX ORDER — SPIRONOLACTONE 25 MG/1
25 TABLET ORAL DAILY
Status: DISCONTINUED | OUTPATIENT
Start: 2025-05-29 | End: 2025-06-01 | Stop reason: HOSPADM

## 2025-05-29 RX ORDER — ALBUTEROL SULFATE 0.83 MG/ML
2.5 SOLUTION RESPIRATORY (INHALATION) EVERY 6 HOURS PRN
Status: DISCONTINUED | OUTPATIENT
Start: 2025-05-29 | End: 2025-06-01 | Stop reason: HOSPADM

## 2025-05-29 RX ORDER — ONDANSETRON 2 MG/ML
4 INJECTION INTRAMUSCULAR; INTRAVENOUS EVERY 6 HOURS PRN
Status: DISCONTINUED | OUTPATIENT
Start: 2025-05-29 | End: 2025-06-01 | Stop reason: HOSPADM

## 2025-05-29 RX ORDER — ACETAMINOPHEN 325 MG/1
650 TABLET ORAL
Status: DISCONTINUED | OUTPATIENT
Start: 2025-05-29 | End: 2025-05-29 | Stop reason: HOSPADM

## 2025-05-29 RX ORDER — SODIUM CHLORIDE 9 MG/ML
INJECTION, SOLUTION INTRAVENOUS PRN
Status: DISCONTINUED | OUTPATIENT
Start: 2025-05-29 | End: 2025-05-29 | Stop reason: HOSPADM

## 2025-05-29 RX ADMIN — FENTANYL CITRATE 25 MCG: 50 INJECTION INTRAMUSCULAR; INTRAVENOUS at 10:09

## 2025-05-29 RX ADMIN — SODIUM CHLORIDE: 0.9 INJECTION, SOLUTION INTRAVENOUS at 23:30

## 2025-05-29 RX ADMIN — HYDROCODONE BITARTRATE AND ACETAMINOPHEN 1 TABLET: 5; 325 TABLET ORAL at 21:14

## 2025-05-29 RX ADMIN — ROPIVACAINE HYDROCHLORIDE 30 ML: 5 INJECTION, SOLUTION EPIDURAL; INFILTRATION; PERINEURAL at 10:02

## 2025-05-29 RX ADMIN — METFORMIN ER 500 MG 1000 MG: 500 TABLET ORAL at 21:14

## 2025-05-29 RX ADMIN — HYDROMORPHONE HYDROCHLORIDE 0.5 MG: 1 INJECTION, SOLUTION INTRAMUSCULAR; INTRAVENOUS; SUBCUTANEOUS at 09:32

## 2025-05-29 RX ADMIN — PROPOFOL 100 MG: 10 INJECTION, EMULSION INTRAVENOUS at 07:50

## 2025-05-29 RX ADMIN — SODIUM CHLORIDE, SODIUM LACTATE, POTASSIUM CHLORIDE, AND CALCIUM CHLORIDE: .6; .31; .03; .02 INJECTION, SOLUTION INTRAVENOUS at 06:46

## 2025-05-29 RX ADMIN — ROSUVASTATIN CALCIUM 10 MG: 20 TABLET, FILM COATED ORAL at 23:30

## 2025-05-29 RX ADMIN — ONDANSETRON 4 MG: 2 INJECTION, SOLUTION INTRAMUSCULAR; INTRAVENOUS at 14:35

## 2025-05-29 RX ADMIN — DEXAMETHASONE SODIUM PHOSPHATE 4 MG: 4 INJECTION INTRA-ARTICULAR; INTRALESIONAL; INTRAMUSCULAR; INTRAVENOUS; SOFT TISSUE at 07:49

## 2025-05-29 RX ADMIN — ONDANSETRON 4 MG: 2 INJECTION, SOLUTION INTRAMUSCULAR; INTRAVENOUS at 07:37

## 2025-05-29 RX ADMIN — FENTANYL CITRATE 100 MCG: 50 INJECTION, SOLUTION INTRAMUSCULAR; INTRAVENOUS at 07:37

## 2025-05-29 RX ADMIN — SODIUM CHLORIDE: 0.9 INJECTION, SOLUTION INTRAVENOUS at 19:04

## 2025-05-29 RX ADMIN — ALOGLIPTIN 25 MG: 25 TABLET, FILM COATED ORAL at 21:14

## 2025-05-29 RX ADMIN — LIDOCAINE HYDROCHLORIDE 100 MG: 20 INJECTION, SOLUTION INTRAVENOUS at 07:45

## 2025-05-29 RX ADMIN — BUTALBITAL, ACETAMINOPHEN, AND CAFFEINE 1 TABLET: 325; 50; 40 TABLET ORAL at 12:45

## 2025-05-29 RX ADMIN — GABAPENTIN 100 MG: 100 CAPSULE ORAL at 23:30

## 2025-05-29 RX ADMIN — VANCOMYCIN HYDROCHLORIDE 2500 MG: 10 INJECTION, POWDER, LYOPHILIZED, FOR SOLUTION INTRAVENOUS at 19:56

## 2025-05-29 RX ADMIN — CETIRIZINE HYDROCHLORIDE 10 MG: 10 TABLET, FILM COATED ORAL at 21:14

## 2025-05-29 RX ADMIN — HYDROMORPHONE HYDROCHLORIDE 1 MG: 1 INJECTION, SOLUTION INTRAMUSCULAR; INTRAVENOUS; SUBCUTANEOUS at 08:00

## 2025-05-29 RX ADMIN — PROPOFOL 100 MG: 10 INJECTION, EMULSION INTRAVENOUS at 07:59

## 2025-05-29 RX ADMIN — Medication 120 MG: at 07:45

## 2025-05-29 RX ADMIN — PROPOFOL 100 MG: 10 INJECTION, EMULSION INTRAVENOUS at 07:45

## 2025-05-29 RX ADMIN — CLINDAMYCIN PHOSPHATE 900 MG: 900 INJECTION, SOLUTION INTRAVENOUS at 08:07

## 2025-05-29 RX ADMIN — CEFEPIME 2000 MG: 2 INJECTION, POWDER, FOR SOLUTION INTRAVENOUS at 23:41

## 2025-05-29 RX ADMIN — HYDROMORPHONE HYDROCHLORIDE 0.5 MG: 1 INJECTION, SOLUTION INTRAMUSCULAR; INTRAVENOUS; SUBCUTANEOUS at 09:05

## 2025-05-29 RX ADMIN — SPIRONOLACTONE 25 MG: 25 TABLET ORAL at 21:14

## 2025-05-29 ASSESSMENT — PAIN SCALES - GENERAL
PAINLEVEL_OUTOF10: 7
PAINLEVEL_OUTOF10: 6
PAINLEVEL_OUTOF10: 3
PAINLEVEL_OUTOF10: 7
PAINLEVEL_OUTOF10: 3
PAINLEVEL_OUTOF10: 4
PAINLEVEL_OUTOF10: 0
PAINLEVEL_OUTOF10: 4
PAINLEVEL_OUTOF10: 0
PAINLEVEL_OUTOF10: 0
PAINLEVEL_OUTOF10: 5

## 2025-05-29 ASSESSMENT — PAIN DESCRIPTION - ORIENTATION
ORIENTATION: RIGHT
ORIENTATION: ANTERIOR

## 2025-05-29 ASSESSMENT — PAIN DESCRIPTION - DESCRIPTORS
DESCRIPTORS: THROBBING
DESCRIPTORS: ACHING
DESCRIPTORS: THROBBING
DESCRIPTORS: TINGLING

## 2025-05-29 ASSESSMENT — PAIN DESCRIPTION - LOCATION
LOCATION: FOOT
LOCATION: HEAD

## 2025-05-29 ASSESSMENT — PAIN DESCRIPTION - ONSET: ONSET: ON-GOING

## 2025-05-29 ASSESSMENT — PAIN DESCRIPTION - PAIN TYPE: TYPE: ACUTE PAIN

## 2025-05-29 ASSESSMENT — PAIN - FUNCTIONAL ASSESSMENT: PAIN_FUNCTIONAL_ASSESSMENT: ACTIVITIES ARE NOT PREVENTED

## 2025-05-29 ASSESSMENT — PAIN DESCRIPTION - FREQUENCY: FREQUENCY: CONTINUOUS

## 2025-05-29 NOTE — ADDENDUM NOTE
Addendum  created 05/29/25 1008 by Oscar Vasquez MD    Child order released for a procedure order, Clinical Note Signed, Intraprocedure Blocks edited, SmartForm saved

## 2025-05-29 NOTE — ANESTHESIA POSTPROCEDURE EVALUATION
Department of Anesthesiology  Postprocedure Note    Patient: Garima Taylor  MRN: [id number]  YOB: 1975  Date of evaluation: 5/29/2025    Procedure Summary       Date: 05/29/25 Room / Location: [address]    Anesthesia Start: 0737 Anesthesia Stop: 0841    Procedure: LEFT REMOVAL OF HEEL SCREWS (Left: Foot) Diagnosis:       Other mechanical complication of internal fixation device of bone of foot, sequela      (Other mechanical complication of internal fixation device of bone of foot, sequela [T84.293S])    Surgeons: Roland Gillespie MD Responsible Provider: Oscar Vasquez MD    Anesthesia Type: general ASA Status: 3            Anesthesia Type: No value filed.    Weston Phase I: Weston Score: 8    Weston Phase II:      Anesthesia Post Evaluation    Patient location during evaluation: PACU  Patient participation: complete - patient participated  Level of consciousness: awake  Pain score: 1  Nausea & Vomiting: no nausea and no vomiting  Cardiovascular status: blood pressure returned to baseline  Respiratory status: acceptable  Hydration status: euvolemic  Comments: Pain 7/10, so postoperative popliteal nerve block performed. Pain decreased to 1/10.  Pain management: adequate    No notable events documented.

## 2025-05-29 NOTE — PROGRESS NOTES
4 Eyes Skin Assessment     NAME:  Garima Taylor  YOB: 1975  MEDICAL RECORD NUMBER:  [id number]    The patient is being assessed for  Admission    I agree that at least one RN has performed a thorough Head to Toe Skin Assessment on the patient. ALL assessment sites listed below have been assessed.      Areas assessed by both nurses:    Head, Face, Ears, Shoulders, Back, Chest, Arms, Elbows, Hands, Sacrum. Buttock, Coccyx, Ischium, Legs. Feet and Heels, and Under Medical Devices     -Incision to left ankle        Does the Patient have a Wound? No noted wound(s)       Bean Prevention initiated by RN: No  Wound Care Orders initiated by RN: No    Pressure Injury (Stage 3,4, Unstageable, DTI, NWPT, and Complex wounds) if present, place Wound referral order by RN under : No    New Ostomies, if present place, Ostomy referral order under : No     Nurse 1 eSignature: Electronically signed by Kelli You RN on 5/29/25 at 6:49 PM EDT    **SHARE this note so that the co-signing nurse can place an eSignature**    Nurse 2 eSignature: Electronically signed by CHAD BOYER RN on 5/29/25 at 8:00 PM EDT

## 2025-05-29 NOTE — PROGRESS NOTES
PACU Transfer Note    Vitals:    05/29/25 1700   BP: 136/76   Pulse: 96   Resp: 19   Temp: 98.4 °F (36.9 °C)   SpO2: 96%       In: 1191 [P.O.:400; I.V.:791]  Out: -     Pain assessment:  none  Pain Level: 0    Report given to Receiving unit RN.    5/29/2025 5:06 PM

## 2025-05-29 NOTE — ANESTHESIA PRE PROCEDURE
Department of Anesthesiology  Preprocedure Note       Name:  Garima Taylor   Age:  49 y.o.  :  1975                                          MRN:  [id number]         Date:  2025      Surgeon: Surgeon(s):  Roland Gillespie MD    Procedure: Procedure(s):  LEFT REMOVAL OF HEEL SCREWS    Medications prior to admission:   Prior to Admission medications    Medication Sig Start Date End Date Taking? Authorizing Provider   acetaminophen (TYLENOL) 325 MG tablet Take 2 tablets by mouth every 6 hours as needed for Pain   Yes Jennifer Clarke MD   VYVANSE 40 MG CAPS Take 40 mg by mouth daily for 30 days. Max Daily Amount: 40 mg 25 Yes Rimma Nava MD   tiZANidine (ZANAFLEX) 2 MG tablet Take  1-2  BID prn leg cramps 25  Yes Daniel aMr MD   pantoprazole (PROTONIX) 40 MG tablet TAKE 1 TABLET BY MOUTH EVERY DAY 5/15/25  Yes Rimma Nava MD   furosemide (LASIX) 40 MG tablet TAKE 1 TABLET BY MOUTH EVERY DAY AS NEEDED 5/15/25  Yes Rimma Nava MD   levocetirizine (XYZAL) 5 MG tablet Take 1 tablet by mouth nightly as needed (allergies) 25  Yes Rimma Nava MD   metFORMIN (GLUCOPHAGE-XR) 500 MG extended release tablet Take 2 bid 3/28/25  Yes Rimma Nava MD   rosuvastatin (CRESTOR) 10 MG tablet TAKE 1 TABLET BY MOUTH EVERY DAY 25  Yes Rimma Nava MD   fluticasone (FLONASE) 50 MCG/ACT nasal spray SPRAY 1 SPRAY INTRANASALLY EVERY DAY AS NEEDED FOR RHINITIS 24  Yes Rimma Nava MD   albuterol sulfate HFA (VENTOLIN HFA) 108 (90 Base) MCG/ACT inhaler Inhale 2 puffs into the lungs 4 times daily as needed for Wheezing 12/15/23  Yes Rimma Nava MD   spironolactone (ALDACTONE) 25 MG tablet daily 19  Yes Jennifer Clarke MD   metoprolol tartrate (LOPRESSOR) 50 MG tablet TAKE 1 TABLET BY MOUTH TWICE A DAY 19  Yes Provider, MD Jennifer   acetaminophen-codeine (TYLENOL #3) 300-30 MG per tablet TAKE 1 TABLET BY

## 2025-05-29 NOTE — DISCHARGE INSTRUCTIONS
[facility], [facility]  [phone]    POSTOPERATIVE INSTRUCTIONS    - For the first 5 Days after your surgery, rest and elevate the surgical area as much as possible.     - Side effects to anesthesia may include nausea, lightheadedness, coughing, sore throat, and/or muscle aches. Rest, fluids, and light foods can help.    Please call our office if:      - Your hand or foot becomes numb, blue, or cold. It is normal to experience numbness 12 to 24 hrs after surgery if you did receive a nerve block.    - You have severe pain or burning which is not relieved with your pain medication.     - Your incision has become reddened or swollen, painful or has excessive bleeding or foul smelling drainage.    - Medications have been called to your pharmacy.  This will be the pharmacy you gave Dr. Gillespie's team before surgery.    Take Aspirin 325mg morning and evening until instructed to discontinue this medication.      If your lower extremity was operated on:    Crutches/Walker/Roll-about as needed to aid in ambulation.     You are to remain non-weight bearing on your operative extremity.     You may loosen your Ace wrap: as necessary for pain control    Ok to begin gentle knee range of motion exercises evening of surgery.  Do your exercsies for DVT prevention as described to you by the therapist pre-operatively.    Your dressings will need to be changed daily.  The foot should be washed with soap/water and dried.  The wound on the posterior heel will need to be packed with 1/4\" Iodoform gauze and covered with a wet-to-dry dressing, 4x4s, rolled gauze, and an ACE wrap.    Caring for Your Peripherally Inserted Central Catheter (PICC)      You are going home with a peripherally inserted catheter (PICC). This small, soft tube has been placed in a vein in your arm. It is often used when treatment requires medications or nutrition for weeks or months. At home, you need to take care of your PICC to keep it working. And since a

## 2025-05-29 NOTE — DISCHARGE INSTR - COC
Continuity of Care Form    Patient Name: Garima Taylor   :  1975  MRN:  [id number]    Admit date:  2025  Discharge date:  ***    Code Status Order: No Order   Advance Directives:    Date/Time Healthcare Directive Type of Healthcare Directive Copy in Chart Healthcare Agent Appointed Healthcare Agent's Name Healthcare Agent's Phone Number    [phone] No, patient does not have an advance directive for healthcare treatment  --  --  --  --  --             Admitting Physician:  Roland Gillespie MD  PCP: Rimma Nava MD    Discharging Nurse: ***  Discharging Hospital Unit/Room#: OR/NONE  Discharging Unit Phone Number: ***    Emergency Contact:   Extended Emergency Contact Information  Primary Emergency Contact: Cody Vizcarra  Mobile Phone: [phone]  Relation: Child  Secondary Emergency Contact: David Ellis  Mobile Phone: [phone]  Relation: Domestic Partner    Past Surgical History:  Past Surgical History:   Procedure Laterality Date     SECTION      x3    COLONOSCOPY      DILATION AND CURETTAGE OF UTERUS      ENDOMETRIAL ABLATION      FOOT SURGERY Left 2025    LEFT: SUBTALAR JOINT ARTHRODESIS, POSTERIOR TIBIAL TENDON RECONSTRUCTION, FLEXOR TENDON TRANSFER, GASTROCNEMIUS RECESSION performed by Roland Gillespie MD at [facility] OR    FOOT SURGERY Left 2025    . performed by Roland Gillespie MD at [facility] OR    GASTROCNEMIUS RECESSION Left 2025    . performed by Roland Gillespie MD at [facility] OR    NASAL FRACTURE SURGERY      UPPER GASTROINTESTINAL ENDOSCOPY N/A 2024    ESOPHAGOGASTRODUODENOSCOPY BIOPSY performed by Juani Hook MD at [facility] ENDOSCOPY       Immunization History:   Immunization History   Administered Date(s) Administered    COVID-19, MODERNA BLUE border, Primary or Immunocompromised, (age 12y+), IM, 100 mcg/0.5mL 2021    Hep A, HAVRIX, VAQTA, (age 19y+), IM, 1mL 2018, 10/12/2020    Influenza, FLUARIX,

## 2025-05-29 NOTE — PROGRESS NOTES
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    Thank you for consulting pharmacy,    Khurram Ramirez [facility]  5/29/2025  7:34 PM        Interval update:  therapy initiation     Subjective/Objective:   Garima Taylor is a 49 y.o. 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     Pharmacy is consulted to dose vancomycin.    Ht Readings from Last 1 Encounters:   05/29/25 1.651 m (5' 5\")     Wt Readings from Last 1 Encounters:   05/29/25 130.4 kg (287 lb 6.4 oz)     Current & Prior Antimicrobial Regimen(s):  Vancomycin 1250 mg IV q12hr  Cefepime 2 gm IV q8hr    Vancomycin Level(s) / Doses:    Date Time Dose Type of Level / Level Interpretation

## 2025-05-29 NOTE — PROGRESS NOTES
Vitals:    05/29/25 1801   BP: 131/82   Pulse: 92   Resp: 18   Temp: 98.9 °F (37.2 °C)   SpO2: 95%     Patient admitted to room 5523. Welcome packet given. Report given from PACU RN. Dressing CDI. Passes swallow screening. Numbness present in LLE, block received in LLE. Unable to wiggle toes or flex foot. Partial weight bearing in LLE. Ambulated to bathroom. Voiding well via BRP. No BM this shift. SCD pumps on. IVF running. Tolerating po diet, denies n/v. No acute neuro changes. Fall precautions in place. Call light left within reach.

## 2025-05-29 NOTE — PROGRESS NOTES
Pt arrived from OR, s/p LEFT REMOVAL OF HEEL SCREWS - Left , report received from CRNA and OR RN, pt sleepy on arrival, oral airway removed. Pt will be admitted, see orders

## 2025-05-29 NOTE — H&P
Date of Surgery Update:  Garima Taylor was seen, history and physical examination reviewed, and patient examined by me today. There have been no significant clinical changes since the completion of the previous history and physical.    The risks, benefits, and alternatives of the proposed procedure have been explained to the patient (or appropriate guardian) and understanding verbalized. All questions answered. Patient wishes to proceed.    Electronically signed by: Roland Gillespie MD,5/29/2025,7:36 AM

## 2025-05-29 NOTE — ANESTHESIA PROCEDURE NOTES
Peripheral Block    Patient location during procedure: PACU  Reason for block: procedure for pain, post-op pain management and at surgeon's request  Start time: 5/29/2025 10:02 AM  End time: 5/29/2025 10:06 AM  Staffing  Performed: anesthesiologist   Anesthesiologist: Oscar Vasquez MD  Performed by: Oscar Vasquez MD  Authorized by: Oscar Vasquez MD    Preanesthetic Checklist  Completed: patient identified, IV checked, site marked, risks and benefits discussed, surgical/procedural consents, equipment checked, pre-op evaluation, timeout performed, anesthesia consent given, oxygen available, monitors applied/VS acknowledged, fire risk safety assessment completed and verbalized and blood product R/B/A discussed and consented  Peripheral Block   Patient position: right lateral decubitus  Prep: ChloraPrep  Provider prep: mask and sterile gloves  Patient monitoring: cardiac monitor, continuous pulse ox, continuous capnometry, frequent blood pressure checks, IV access, oxygen and responsive to questions  Block type: Sciatic  Popliteal  Laterality: left  Injection technique: single-shot  Guidance: ultrasound guided    Needle   Needle type: insulated echogenic nerve stimulator needle   Needle gauge: 22 G  Needle localization: ultrasound guidance  Needle length: 8 cm  Assessment   Injection assessment: negative aspiration for heme, no paresthesia on injection, local visualized surrounding nerve on ultrasound and no intravascular symptoms  Paresthesia pain: none  Slow fractionated injection: yes  Hemodynamics: stable  Outcomes: uncomplicated and patient tolerated procedure well    Additional Notes  30 ml 0.5% ropivacaine injected in 5 ml increments with negative aspiration between. No complications.  Medications Administered  ropivacaine (NAROPIN) injection 0.5% - Perineural   30 mL - 5/29/2025 10:02:00 AM

## 2025-05-29 NOTE — BRIEF OP NOTE
Brief Postoperative Note      Patient: Garima Taylor  YOB: 1975  MRN: [id number]    Date of Procedure: 5/29/2025    Pre-Op Diagnosis Codes:      * Other mechanical complication of internal fixation device of bone of foot, sequela [T84.293S]    Post-Op Diagnosis: Same       Procedure(s):  LEFT REMOVAL OF HEEL SCREWS    Surgeon(s):  Roland Gillespie MD    Assistant:  Surgical Assistant: Geo Fatima Assistant: Kendal Martines PA    Anesthesia: General    Estimated Blood Loss (mL): Minimal    Complications: None    Specimens:   ID Type Source Tests Collected by Time Destination   1 : LEFT FOOT CALCANEUS TISSUE Tissue Tissue CULTURE, FUNGUS, CULTURE WITH SMEAR, ACID FAST BACILLIUS, CULTURE, ANAEROBIC AND AEROBIC Roland Gillespie MD 5/29/2025 0812    2 : LEFT FOOT CALCANEUS SWAB Body Fluid Fluid CULTURE, FUNGUS, CULTURE WITH SMEAR, ACID FAST BACILLIUS, CULTURE, ANAEROBIC AND AEROBIC Roland Gillespie MD 5/29/2025 0814        Implants:  * No implants in log *      Drains: * No LDAs found *    Findings:  Other Findings: The ulceration on the posterior heel that previously had resolved has recurred.  The ulceration extended down to the posterior heel screws and bone.     Electronically signed by Roland Gillespie MD on 5/29/2025 at 8:31 AM

## 2025-05-29 NOTE — OP NOTE
opportunity to ask questions, her questions answered to her satisfaction.  She has given consent to proceed with the above-outlined procedures.    OPERATIVE PROCEDURE:  The patient was brought to the operating room, placed in the supine position on the operating table.  After induction of general anesthetic, the patient's left leg was prepped and draped free in the usual sterile fashion.      A second surgeon was necessary throughout the procedure to aid with appropriate positioning of the patient and positioning of the extremity due to the patient's increased size and body mass index.  This increased the overall technical complexity of the procedure.  A second surgeon was necessary to aid with curettage of the apparently infected screw sites and a second  surgeon was necessary to aid with operation of the multiplanar fluoroscopy unit.  A second surgeon was necessary to aid with identification and protection of neurologic or vascular structures.  A second surgeon was necessary to decrease overall operative time and to improve patient's safety and outcome.  An assistant with these skills was not available from the hospital at the time of the procedure necessitating Kendal Martines's presence.  Kendal Martines was present, performed, and participated in all integral parts of the procedure.    Removal of hardware.  At this point, the 2 previous screw entry sites were incised.  Dissection was carried out through the subcutaneous and deeper tissue and each screw was entered with the appropriate guidewire.  Purulent tissue was appreciated from within the cannulation and cultures were obtained of this.  The screws were backed out without incident and tissue on the screws was also placed in the culture jar.  The screw tracts were swabbed with culture swabs.    Incision and debridement of left heel.  Gross purulence was appreciated.  I felt the patient would benefit from curettage and formal pulsatile irrigation.  For this reason,

## 2025-05-29 NOTE — PROGRESS NOTES
Pt concerned if she will be on antibiotics, Verna Stahl's office called, no response, pt aware that cultures were obtained in the OR

## 2025-05-30 LAB
ACID FAST STN SPEC QL: NORMAL
ANION GAP SERPL CALCULATED.3IONS-SCNC: 12 MMOL/L (ref 3–16)
BUN SERPL-MCNC: 12 MG/DL (ref 7–20)
CALCIUM SERPL-MCNC: 8.9 MG/DL (ref 8.3–10.6)
CHLORIDE SERPL-SCNC: 104 MMOL/L (ref 99–110)
CK SERPL-CCNC: 80 U/L (ref 26–192)
CO2 SERPL-SCNC: 23 MMOL/L (ref 21–32)
CREAT SERPL-MCNC: 0.8 MG/DL (ref 0.6–1.1)
GFR SERPLBLD CREATININE-BSD FMLA CKD-EPI: 90 ML/MIN/{1.73_M2}
GLUCOSE SERPL-MCNC: 137 MG/DL (ref 70–99)
MRSA DNA SPEC QL NAA+PROBE: NORMAL
POTASSIUM SERPL-SCNC: 3.6 MMOL/L (ref 3.5–5.1)
SODIUM SERPL-SCNC: 139 MMOL/L (ref 136–145)

## 2025-05-30 PROCEDURE — C1751 CATH, INF, PER/CENT/MIDLINE: HCPCS

## 2025-05-30 PROCEDURE — 6360000002 HC RX W HCPCS: Performed by: INTERNAL MEDICINE

## 2025-05-30 PROCEDURE — 2500000003 HC RX 250 WO HCPCS: Performed by: INTERNAL MEDICINE

## 2025-05-30 PROCEDURE — 80048 BASIC METABOLIC PNL TOTAL CA: CPT

## 2025-05-30 PROCEDURE — 2580000003 HC RX 258: Performed by: INTERNAL MEDICINE

## 2025-05-30 PROCEDURE — 6370000000 HC RX 637 (ALT 250 FOR IP): Performed by: ORTHOPAEDIC SURGERY

## 2025-05-30 PROCEDURE — 6360000002 HC RX W HCPCS: Performed by: ORTHOPAEDIC SURGERY

## 2025-05-30 PROCEDURE — 36569 INSJ PICC 5 YR+ W/O IMAGING: CPT

## 2025-05-30 PROCEDURE — 99233 SBSQ HOSP IP/OBS HIGH 50: CPT | Performed by: INTERNAL MEDICINE

## 2025-05-30 PROCEDURE — 6360000002 HC RX W HCPCS: Performed by: NURSE PRACTITIONER

## 2025-05-30 PROCEDURE — 82550 ASSAY OF CK (CPK): CPT

## 2025-05-30 PROCEDURE — 02HV33Z INSERTION OF INFUSION DEVICE INTO SUPERIOR VENA CAVA, PERCUTANEOUS APPROACH: ICD-10-PCS | Performed by: ORTHOPAEDIC SURGERY

## 2025-05-30 PROCEDURE — 36415 COLL VENOUS BLD VENIPUNCTURE: CPT

## 2025-05-30 PROCEDURE — 1200000000 HC SEMI PRIVATE

## 2025-05-30 PROCEDURE — 94150 VITAL CAPACITY TEST: CPT

## 2025-05-30 RX ORDER — GLUCAGON 1 MG/ML
1 KIT INJECTION PRN
Status: DISCONTINUED | OUTPATIENT
Start: 2025-05-30 | End: 2025-05-30

## 2025-05-30 RX ORDER — INSULIN LISPRO 100 [IU]/ML
0-4 INJECTION, SOLUTION INTRAVENOUS; SUBCUTANEOUS
Status: DISCONTINUED | OUTPATIENT
Start: 2025-05-30 | End: 2025-05-30

## 2025-05-30 RX ORDER — DEXTROSE MONOHYDRATE 100 MG/ML
INJECTION, SOLUTION INTRAVENOUS CONTINUOUS PRN
Status: DISCONTINUED | OUTPATIENT
Start: 2025-05-30 | End: 2025-05-30

## 2025-05-30 RX ORDER — NALOXONE HYDROCHLORIDE 0.4 MG/ML
0.4 INJECTION, SOLUTION INTRAMUSCULAR; INTRAVENOUS; SUBCUTANEOUS PRN
Status: DISCONTINUED | OUTPATIENT
Start: 2025-05-30 | End: 2025-06-01 | Stop reason: HOSPADM

## 2025-05-30 RX ORDER — HYDROCODONE BITARTRATE AND ACETAMINOPHEN 5; 325 MG/1; MG/1
1-2 TABLET ORAL
COMMUNITY
Start: 2025-05-30 | End: 2025-06-06

## 2025-05-30 RX ORDER — ASPIRIN 325 MG
325 TABLET ORAL
COMMUNITY
Start: 2025-05-30

## 2025-05-30 RX ORDER — DIPHENHYDRAMINE HYDROCHLORIDE 50 MG/ML
25 INJECTION, SOLUTION INTRAMUSCULAR; INTRAVENOUS
Status: DISCONTINUED | OUTPATIENT
Start: 2025-05-30 | End: 2025-06-01 | Stop reason: HOSPADM

## 2025-05-30 RX ADMIN — METFORMIN ER 500 MG 1000 MG: 500 TABLET ORAL at 16:49

## 2025-05-30 RX ADMIN — HYDROCODONE BITARTRATE AND ACETAMINOPHEN 1 TABLET: 5; 325 TABLET ORAL at 17:40

## 2025-05-30 RX ADMIN — HEPARIN SODIUM 5000 UNITS: 5000 INJECTION INTRAVENOUS; SUBCUTANEOUS at 21:01

## 2025-05-30 RX ADMIN — FUROSEMIDE 40 MG: 40 TABLET ORAL at 11:10

## 2025-05-30 RX ADMIN — PANTOPRAZOLE SODIUM 40 MG: 40 TABLET, DELAYED RELEASE ORAL at 08:40

## 2025-05-30 RX ADMIN — SODIUM CHLORIDE 1500 MG: 0.9 INJECTION, SOLUTION INTRAVENOUS at 11:10

## 2025-05-30 RX ADMIN — LISDEXAMFETAMINE DIMESYLATE 40 MG: 40 CAPSULE ORAL at 11:30

## 2025-05-30 RX ADMIN — SODIUM CHLORIDE, PRESERVATIVE FREE 10 ML: 5 INJECTION INTRAVENOUS at 11:16

## 2025-05-30 RX ADMIN — LIDOCAINE HYDROCHLORIDE 50 MG: 10 INJECTION, SOLUTION EPIDURAL; INFILTRATION; INTRACAUDAL; PERINEURAL at 10:30

## 2025-05-30 RX ADMIN — SPIRONOLACTONE 25 MG: 25 TABLET ORAL at 08:39

## 2025-05-30 RX ADMIN — HYDROMORPHONE HYDROCHLORIDE 0.5 MG: 1 INJECTION, SOLUTION INTRAMUSCULAR; INTRAVENOUS; SUBCUTANEOUS at 21:02

## 2025-05-30 RX ADMIN — DIPHENHYDRAMINE HYDROCHLORIDE 25 MG: 50 INJECTION INTRAMUSCULAR; INTRAVENOUS at 21:02

## 2025-05-30 RX ADMIN — HYDROCODONE BITARTRATE AND ACETAMINOPHEN 1 TABLET: 5; 325 TABLET ORAL at 11:10

## 2025-05-30 RX ADMIN — ERTAPENEM SODIUM 1000 MG: 1 INJECTION INTRAMUSCULAR; INTRAVENOUS at 15:06

## 2025-05-30 RX ADMIN — HEPARIN SODIUM 5000 UNITS: 5000 INJECTION INTRAVENOUS; SUBCUTANEOUS at 08:40

## 2025-05-30 RX ADMIN — CETIRIZINE HYDROCHLORIDE 10 MG: 10 TABLET, FILM COATED ORAL at 08:39

## 2025-05-30 RX ADMIN — METFORMIN ER 500 MG 1000 MG: 500 TABLET ORAL at 08:39

## 2025-05-30 RX ADMIN — CEFEPIME 2000 MG: 2 INJECTION, POWDER, FOR SOLUTION INTRAVENOUS at 21:08

## 2025-05-30 RX ADMIN — HYDROMORPHONE HYDROCHLORIDE 0.5 MG: 1 INJECTION, SOLUTION INTRAMUSCULAR; INTRAVENOUS; SUBCUTANEOUS at 16:48

## 2025-05-30 RX ADMIN — HYDROCODONE BITARTRATE AND ACETAMINOPHEN 1 TABLET: 5; 325 TABLET ORAL at 06:14

## 2025-05-30 RX ADMIN — ROSUVASTATIN CALCIUM 10 MG: 20 TABLET, FILM COATED ORAL at 08:39

## 2025-05-30 RX ADMIN — GABAPENTIN 100 MG: 100 CAPSULE ORAL at 08:39

## 2025-05-30 RX ADMIN — CEFEPIME 2000 MG: 2 INJECTION, POWDER, FOR SOLUTION INTRAVENOUS at 13:26

## 2025-05-30 RX ADMIN — GABAPENTIN 100 MG: 100 CAPSULE ORAL at 21:01

## 2025-05-30 RX ADMIN — METOPROLOL SUCCINATE 50 MG: 25 TABLET, FILM COATED, EXTENDED RELEASE ORAL at 08:39

## 2025-05-30 RX ADMIN — DAPTOMYCIN 500 MG: 500 INJECTION, POWDER, LYOPHILIZED, FOR SOLUTION INTRAVENOUS at 15:04

## 2025-05-30 ASSESSMENT — PAIN DESCRIPTION - PAIN TYPE
TYPE: SURGICAL PAIN
TYPE: SURGICAL PAIN
TYPE: SURGICAL PAIN;CHRONIC PAIN
TYPE: SURGICAL PAIN

## 2025-05-30 ASSESSMENT — PAIN DESCRIPTION - ONSET
ONSET: ON-GOING
ONSET: PROGRESSIVE

## 2025-05-30 ASSESSMENT — PAIN DESCRIPTION - LOCATION
LOCATION: FOOT
LOCATION: ANKLE
LOCATION: FOOT;NECK
LOCATION: FOOT
LOCATION: FOOT

## 2025-05-30 ASSESSMENT — PAIN - FUNCTIONAL ASSESSMENT
PAIN_FUNCTIONAL_ASSESSMENT: ACTIVITIES ARE NOT PREVENTED
PAIN_FUNCTIONAL_ASSESSMENT: PREVENTS OR INTERFERES SOME ACTIVE ACTIVITIES AND ADLS
PAIN_FUNCTIONAL_ASSESSMENT: ACTIVITIES ARE NOT PREVENTED

## 2025-05-30 ASSESSMENT — PAIN DESCRIPTION - ORIENTATION
ORIENTATION: LEFT

## 2025-05-30 ASSESSMENT — PAIN DESCRIPTION - DESCRIPTORS
DESCRIPTORS: ACHING;DISCOMFORT
DESCRIPTORS: ACHING;NUMBNESS
DESCRIPTORS: TINGLING;BURNING;STABBING
DESCRIPTORS: DULL;JABBING
DESCRIPTORS: ACHING

## 2025-05-30 ASSESSMENT — PAIN SCALES - GENERAL
PAINLEVEL_OUTOF10: 4
PAINLEVEL_OUTOF10: 4
PAINLEVEL_OUTOF10: 6
PAINLEVEL_OUTOF10: 3
PAINLEVEL_OUTOF10: 4
PAINLEVEL_OUTOF10: 8
PAINLEVEL_OUTOF10: 10
PAINLEVEL_OUTOF10: 4
PAINLEVEL_OUTOF10: 3
PAINLEVEL_OUTOF10: 6

## 2025-05-30 ASSESSMENT — PAIN DESCRIPTION - FREQUENCY
FREQUENCY: CONTINUOUS

## 2025-05-30 NOTE — PROGRESS NOTES
Ortho Progress Note    POD 1 s/p I&D left foot and removal of hardware.  She complains of some heel pain with movement.  Dressings with some sanguinous drainage.    Dressings intact.  Dressings were removed to visualize wound on heel which seems to be less erythematous.  Brisk capillary refill in all digits.    Continue NWB on operative extremity.  Begin daily wound care today -- wash foot with antibacterial soap/water and dry foot.  Pack wound on heel with 1/4\" Iodoform gauze and cover with a wet-to-dry dressing, 4x4s, rolled gauze, and ACE.  IV antibiotics per ID.      Okay for discharge from an ortho standpoint once IV abx and home care/home nursing have been arranged for the patient.  Her pain medication will be sent to her home pharmacy.

## 2025-05-30 NOTE — PROGRESS NOTES
[facility] -  Clinical Pharmacy Note    Vancomycin - Management by Pharmacy    Consult Date(s): 5/30/25  Consulting Provider(s): He    Assessment / Plan  L foot hardware infection/OM - Vancomycin  Concurrent Antimicrobials: Cefepime  Day of Vanc Therapy / Ordered Duration: 2  Current Dosing Method: Bayesian-Guided AUC Dosing  Therapeutic Goal: -600 mg/L*hr  Current Dose / Plan:   Renal function stable / baseline - SCr 0.8  Received loading dose of 2500mg IV x1 yesterday  Will continue 1500mg IV q12h  Regimen predicts AUC = 575 and trough = 13.6  Level ordered for tomorrow AM, Sat 5/31 to confirm kinetic estimates   Will continue to monitor clinical condition and make adjustments to regimen as appropriate.    Thank you for consulting pharmacy,    Please call with questions--  Dilia De, PharmD, [facility]  Wireless: [phone]   5/30/2025 10:30 AM        Interval update:   S/p removal of L heel screws, I&D 5/29, noted to have gross purulence per op note    Subjective/Objective:   Garima Taylor is a 49 y.o. female with a PMHx significant for asthma, PCOS, L subtalar joint arthrodesis (Jan 2025) admitted 5/29 with planned admission for L foot I&D for presumed osteomyelitis (done 5/29).    Pharmacy is consulted to dose Vancomycin .    Ht Readings from Last 1 Encounters:   05/29/25 1.651 m (5' 5\")     Wt Readings from Last 1 Encounters:   05/29/25 130.4 kg (287 lb 6.4 oz)     Current & Prior Antimicrobial Regimen(s):  Cefepime (5/29-current)  Vancomycin - Pharmacy to dose  2500mg IV x1 5/29  1500mg IV q12h (5/30-current)    Vancomycin Level(s) / Doses:    Date Time Dose Type of Level / Level Interpretation                 Note: Serum levels collected for AUC-based dosing may be high if collected in close proximity to the dose administered. This is not necessarily indicative of toxicity.    Cultures & Sensitivities:    Date Site Micro Susceptibility / Result   5/29 MRSA DNA probe, nasal  Negative    5/29 L

## 2025-05-30 NOTE — PROGRESS NOTES
[facility] - Clinical Pharmacy Note - Extended Infusion Beta-Lactam Adjustment     Cefepime 2000 mg IV over 30 minutes q8hr ordered for treatment of bone and joint infection. Per [facility] Extended Infusion Beta-Lactam Policy, cefepime 2000 mg IV over 30 minutes q8hr will be changed to cefepime 2000 mg IV over 240 minutes q8hr.      Estimated Creatinine Clearance: Estimated Creatinine Clearance: 103 mL/min (based on SCr of 0.9 mg/dL).  Dialysis Status, ANGELO, CKD: N/A  BMI: Body mass index is 47.83 kg/m².     Rationale for Adjustment: Agent demonstrates time-dependent effect on bacterial eradication. Extended-infusion dosing strategy aims to enhance microbiologic and clinical efficacy.     Pharmacy will continue to monitor cultures and sensitivities (where available) and renal function and adjust dose as necessary.       Please call with any questions.     Khurram Ramirez RPH  5/29/2025

## 2025-05-30 NOTE — DISCHARGE INSTR - DIET

## 2025-05-30 NOTE — PROCEDURES
PROCEDURE NOTE  Date: 2025   Name: Garima Taylor  YOB: 1975    Procedures                                                                   SINGLE PICC PROCEDURE NOTE  Chart reviewed for allergies, diagnosis, labs, known contraindications, reason for line placement and planned length of treatment.  Informed consent noted to be signed and on chart.  Insertion procedure discussed with patient/family member.  Three patient identifiers - Patient name,   and MRN -  completed &  confirmed verbally.         Time out performed Hat, mask and eye shield donned.  PICC site cleaned with chlorhexidine wipes then scrubbed with Chloraprep  One-Step applicator for 30 seconds x 1.   Hand Hygiene  performed with 3% Chlorhexidine surgical scrub x1 min prior to  sterile gloves, sterile gown being donned.  Patient draped using maximal sterile barrier technique ( head to toe ).  PICC site scrubbed a 2nd time with Chloraprep One-Step applicator x 30 sec. Modified Seldinger technique/ultrasound assisted and tip locating system utilized for insertion and 1% Lidocaine 5 ml injected intradermal pre-insertion.  PICC tip location in the SVC confirmed by ECG technology.   Positive brisk blood return obtained from lumen.  Valve applied to lumen and flushed with 10 mls  0.9% Sterile Sodium Chloride.  All lumens flush easily with no resistance.  Skin prep applied to site.  Catheter secured with non-sutured locking device per hospital protocol. Bio-patch/CHG impregnated sterile tegaderm dressing applied.  Alcohol Swab Cap applied to valve.  Sterile field maintained during procedure.  PICC insertion, rhythm and positioning wire (utilized prn) accounted for post procedure and disposed of in sharps.  Appearance of site is Clean dry and intact without bleeding or edema. All edges of Tegaderm occlusive.   Site marked with date and initials of RN placing line. Teaching performed to pt/family and noted in education section.   Bed

## 2025-05-30 NOTE — ACP (ADVANCE CARE PLANNING)
Advance Care Planning     Advance Care Planning Inpatient Note  [facility] Department    Today's Date: 5/30/2025  Unit: [facility] 5T ORTHO/NEURO    Received request from IDT Member.  Upon review of chart and communication with care team, patient's decision making abilities are not in question.. Patient was/were present in the room during visit.    Goals of ACP Conversation:  Discuss advance care planning documents    Health Care Decision Makers:     No healthcare decision makers have been documented.    Summary:  No Decision Maker named by patient at this time    Advance Care Planning Documents (Patient Wishes):  Healthcare Power of /Advance Directive Appointment of Health Care Agent     Assessment:  Patient shared about her family and support system. She has three children, two adult sons and a teenage daughter. Patient expressed confidence that her oldest son will be an appropriate decision maker. Patient encouraged to fill out documents, she would like to read them and complete this weekend.  will follow up Monday as available to assist.     Interventions:  Provided education on documents for clarity and greater understanding  Discussed and provided education on state decision maker hierarchy  Encouraged ongoing ACP conversation with future decision makers and loved ones  Reviewed but did not complete ACP document    Care Preferences Communicated:   No    Outcomes/Plan:  ACP Discussion: Postponed  Teach Back Method used to verify the patient's and/or Healthcare Decision Maker's understanding of key information in the advance directive documents    Electronically signed by RITA Chung on 5/30/2025 at 12:05 PM

## 2025-05-30 NOTE — PROGRESS NOTES
Dressing change completed. Serosanguineous drainage noted on old dressing. Patient tolerated fairly well. Medicated before procedure. Wound cleansed with CHG soap and water. Wound packed with 1/4 inch iodoform gauze and covered with wet to dry dressing. ACE wrap placed.

## 2025-05-30 NOTE — PROGRESS NOTES
Pt. A&Ox4, with stable vitals & head to toe assessments throughout shift.     PIV R arm, infusing well. NS continues to run @ 125mL/hr, w/ vanc & cefepime being given via piggyback at scheduled times. No signs of infiltration or phlebitis.    Pt. home meds, specifically vyvance (Lisdexamfetamine Dimesylate)have been verified by pharmacy & are away in [facility] B cart meds.    Pt. has had no complaints of pain, but reports that she is starting to feel tingling in her LLE. Pt. has been able to sleep thus fair, & has no called out for RNs.     Pt.s bilateral LEs are edematous, pitting, +2-3. PE stocking was folded over Pt.s R ankle, causing an indentation that did not quickly go subside.     Dressing of LLE remains intact, free of drainage.  Stockings & SCDs on bilaterally, with LLE elevated by 3 pillows, & foot of the bed elevated.     Electronically signed by Monse Grayson RN on 5/30/2025 at 3:18 AM

## 2025-05-30 NOTE — PROGRESS NOTES
ID Follow-up NOTE    CC:   Left foot drainage  Antibiotics: Vancomycin, cefepime    Admit Date: 5/29/2025  Hospital Day: 2    Subjective:     Patient denies any fevers overnight, no worsening left foot pain. Is a bit overwhelmed with having to go home with PICC line and iv abx.       Objective:     Patient Vitals for the past 8 hrs:   BP Temp Temp src Pulse Resp SpO2   05/30/25 1110 -- -- -- -- 16 --   05/30/25 0825 114/69 98.6 °F (37 °C) Oral 81 16 100 %   05/30/25 0644 -- -- -- -- 16 --   05/30/25 0614 -- -- -- -- 17 --   05/30/25 0500 134/77 98.2 °F (36.8 °C) Oral 72 16 --     I/O last 3 completed shifts:  In: 3411.1 [P.O.:700; I.V.:2111.1; IV Piggyback:600]  Out: 1000 [Urine:1000]  No intake/output data recorded.    EXAM:  GENERAL: No apparent distress.    HEENT: Membranes moist, no oral lesion  NECK:  Supple, no lymphadenopathy  LUNGS: Clear b/l, no rales, no dullness  CARDIAC: RRR, no murmur appreciated  ABD:  + BS, soft / NT  EXT:  Left foot in dressing postop  NEURO: No focal neurologic findings  PSYCH: Orientation, sensorium, mood normal  LINES:  Peripheral iv, PICC in right upper extremity       Data Review:  Lab Results   Component Value Date    WBC 11.6 (H) 05/23/2025    HGB 13.2 05/23/2025    HCT 40.6 05/23/2025    MCV 81.0 05/23/2025     05/23/2025     Lab Results   Component Value Date    CREATININE 0.8 05/30/2025    BUN 12 05/30/2025     05/30/2025    K 3.6 05/30/2025     05/30/2025    CO2 23 05/30/2025       Hepatic Function Panel:   Lab Results   Component Value Date/Time    ALKPHOS 108 10/09/2024 03:44 PM    ALT 30 11/22/2024 01:00 PM    AST 26 11/22/2024 01:00 PM    BILITOT 0.4 10/09/2024 03:44 PM       MICRO:    Left foot OR cultures 5/29: moderate pseudomonas     IMAGING: I have independently reviewed the images and reports.       CT left lower extremity without contrast 5/13:  1. Postsurgical changes of two screw fixation through the posterior subtalar articulation. Intact

## 2025-05-31 LAB
ANION GAP SERPL CALCULATED.3IONS-SCNC: 9 MMOL/L (ref 3–16)
BUN SERPL-MCNC: 15 MG/DL (ref 7–20)
CALCIUM SERPL-MCNC: 8.6 MG/DL (ref 8.3–10.6)
CHLORIDE SERPL-SCNC: 104 MMOL/L (ref 99–110)
CO2 SERPL-SCNC: 26 MMOL/L (ref 21–32)
CREAT SERPL-MCNC: 0.8 MG/DL (ref 0.6–1.1)
GFR SERPLBLD CREATININE-BSD FMLA CKD-EPI: 90 ML/MIN/{1.73_M2}
GLUCOSE SERPL-MCNC: 89 MG/DL (ref 70–99)
POTASSIUM SERPL-SCNC: 3.9 MMOL/L (ref 3.5–5.1)
SODIUM SERPL-SCNC: 139 MMOL/L (ref 136–145)
VANCOMYCIN SERPL-MCNC: 22.3 UG/ML

## 2025-05-31 PROCEDURE — 36415 COLL VENOUS BLD VENIPUNCTURE: CPT

## 2025-05-31 PROCEDURE — 80048 BASIC METABOLIC PNL TOTAL CA: CPT

## 2025-05-31 PROCEDURE — 6360000002 HC RX W HCPCS: Performed by: ORTHOPAEDIC SURGERY

## 2025-05-31 PROCEDURE — 1200000000 HC SEMI PRIVATE

## 2025-05-31 PROCEDURE — 6360000002 HC RX W HCPCS: Performed by: INTERNAL MEDICINE

## 2025-05-31 PROCEDURE — 2580000003 HC RX 258: Performed by: ORTHOPAEDIC SURGERY

## 2025-05-31 PROCEDURE — 2580000003 HC RX 258: Performed by: INTERNAL MEDICINE

## 2025-05-31 PROCEDURE — 80202 ASSAY OF VANCOMYCIN: CPT

## 2025-05-31 PROCEDURE — 6370000000 HC RX 637 (ALT 250 FOR IP): Performed by: ORTHOPAEDIC SURGERY

## 2025-05-31 RX ORDER — HYDRALAZINE HYDROCHLORIDE 20 MG/ML
10 INJECTION INTRAMUSCULAR; INTRAVENOUS EVERY 6 HOURS PRN
Status: DISCONTINUED | OUTPATIENT
Start: 2025-05-31 | End: 2025-06-01 | Stop reason: HOSPADM

## 2025-05-31 RX ADMIN — SPIRONOLACTONE 25 MG: 25 TABLET ORAL at 09:32

## 2025-05-31 RX ADMIN — HYDROCODONE BITARTRATE AND ACETAMINOPHEN 1 TABLET: 5; 325 TABLET ORAL at 22:12

## 2025-05-31 RX ADMIN — CEFEPIME 2000 MG: 2 INJECTION, POWDER, FOR SOLUTION INTRAVENOUS at 14:04

## 2025-05-31 RX ADMIN — ROSUVASTATIN CALCIUM 10 MG: 20 TABLET, FILM COATED ORAL at 09:32

## 2025-05-31 RX ADMIN — SODIUM CHLORIDE 1500 MG: 0.9 INJECTION, SOLUTION INTRAVENOUS at 01:44

## 2025-05-31 RX ADMIN — TIZANIDINE 4 MG: 4 TABLET ORAL at 01:37

## 2025-05-31 RX ADMIN — HEPARIN SODIUM 5000 UNITS: 5000 INJECTION INTRAVENOUS; SUBCUTANEOUS at 21:24

## 2025-05-31 RX ADMIN — CEFEPIME 2000 MG: 2 INJECTION, POWDER, FOR SOLUTION INTRAVENOUS at 05:01

## 2025-05-31 RX ADMIN — SODIUM CHLORIDE: 0.9 INJECTION, SOLUTION INTRAVENOUS at 19:57

## 2025-05-31 RX ADMIN — HYDROCODONE BITARTRATE AND ACETAMINOPHEN 1 TABLET: 5; 325 TABLET ORAL at 01:37

## 2025-05-31 RX ADMIN — METOPROLOL SUCCINATE 50 MG: 25 TABLET, FILM COATED, EXTENDED RELEASE ORAL at 09:32

## 2025-05-31 RX ADMIN — PANTOPRAZOLE SODIUM 40 MG: 40 TABLET, DELAYED RELEASE ORAL at 09:31

## 2025-05-31 RX ADMIN — GABAPENTIN 100 MG: 100 CAPSULE ORAL at 21:24

## 2025-05-31 RX ADMIN — HYDROMORPHONE HYDROCHLORIDE 0.5 MG: 1 INJECTION, SOLUTION INTRAMUSCULAR; INTRAVENOUS; SUBCUTANEOUS at 18:48

## 2025-05-31 RX ADMIN — CEFEPIME 2000 MG: 2 INJECTION, POWDER, FOR SOLUTION INTRAVENOUS at 21:32

## 2025-05-31 RX ADMIN — METFORMIN ER 500 MG 1000 MG: 500 TABLET ORAL at 09:32

## 2025-05-31 RX ADMIN — FUROSEMIDE 40 MG: 40 TABLET ORAL at 09:38

## 2025-05-31 RX ADMIN — METFORMIN ER 500 MG 1000 MG: 500 TABLET ORAL at 17:37

## 2025-05-31 RX ADMIN — CETIRIZINE HYDROCHLORIDE 10 MG: 10 TABLET, FILM COATED ORAL at 09:33

## 2025-05-31 RX ADMIN — HYDROCODONE BITARTRATE AND ACETAMINOPHEN 1 TABLET: 5; 325 TABLET ORAL at 10:11

## 2025-05-31 RX ADMIN — GABAPENTIN 100 MG: 100 CAPSULE ORAL at 09:32

## 2025-05-31 RX ADMIN — HYDROCODONE BITARTRATE AND ACETAMINOPHEN 1 TABLET: 5; 325 TABLET ORAL at 16:05

## 2025-05-31 RX ADMIN — HEPARIN SODIUM 5000 UNITS: 5000 INJECTION INTRAVENOUS; SUBCUTANEOUS at 09:32

## 2025-05-31 RX ADMIN — SODIUM CHLORIDE 1500 MG: 0.9 INJECTION, SOLUTION INTRAVENOUS at 14:14

## 2025-05-31 RX ADMIN — ALOGLIPTIN 25 MG: 25 TABLET, FILM COATED ORAL at 09:32

## 2025-05-31 ASSESSMENT — PAIN SCALES - GENERAL
PAINLEVEL_OUTOF10: 5
PAINLEVEL_OUTOF10: 2
PAINLEVEL_OUTOF10: 8
PAINLEVEL_OUTOF10: 5
PAINLEVEL_OUTOF10: 4
PAINLEVEL_OUTOF10: 7
PAINLEVEL_OUTOF10: 6
PAINLEVEL_OUTOF10: 5
PAINLEVEL_OUTOF10: 4
PAINLEVEL_OUTOF10: 6

## 2025-05-31 ASSESSMENT — PAIN DESCRIPTION - PAIN TYPE
TYPE: SURGICAL PAIN
TYPE: SURGICAL PAIN

## 2025-05-31 ASSESSMENT — PAIN DESCRIPTION - ONSET
ONSET: ON-GOING
ONSET: ON-GOING

## 2025-05-31 ASSESSMENT — PAIN - FUNCTIONAL ASSESSMENT
PAIN_FUNCTIONAL_ASSESSMENT: ACTIVITIES ARE NOT PREVENTED

## 2025-05-31 ASSESSMENT — PAIN DESCRIPTION - LOCATION
LOCATION: FOOT
LOCATION: OTHER (COMMENT)
LOCATION: ANKLE
LOCATION: FOOT
LOCATION: FOOT

## 2025-05-31 ASSESSMENT — PAIN DESCRIPTION - DESCRIPTORS
DESCRIPTORS: ACHING;DULL
DESCRIPTORS: ACHING;DISCOMFORT
DESCRIPTORS: SORE
DESCRIPTORS: SHARP;SHOOTING
DESCRIPTORS: SHARP
DESCRIPTORS: SHARP

## 2025-05-31 ASSESSMENT — PAIN DESCRIPTION - ORIENTATION
ORIENTATION: LEFT

## 2025-05-31 ASSESSMENT — PAIN DESCRIPTION - FREQUENCY
FREQUENCY: CONTINUOUS
FREQUENCY: CONTINUOUS

## 2025-05-31 NOTE — PROGRESS NOTES
Pt arrived to room 3307 from 5 tower. Pt oriented to room and educated on safety. Pt stable at this time. Family at bedside. Call light within reach and educated on how to use. Pt verbalized understanding and has no further needs at this time. Will continue with plan of care. Cody Llanes RN

## 2025-05-31 NOTE — PROGRESS NOTES
A&Ox4. VSS on RA. Managing pain with medications per MAR. Dressing CDI. Dressing change completed as ordered. PICC placed today, dressing changed due to breakthrough drainage. Partial weight bearing to LLE. 1x gait belt SBA. Tolerating po diet, denies n/v. Voiding well via BRP. No BM this shift. No acute neuro changes this shift. All needs met. Fall precautions in place. Call light left within reach. Plan of care continues.

## 2025-05-31 NOTE — PROGRESS NOTES
PICC dressing soiled with bloody drainage. Sterile dressing changed completed with second RN, TIMI Pereira at bedside to ensure sterile technique. Patient tolerated procedure well.

## 2025-05-31 NOTE — PROGRESS NOTES
4 Eyes Skin Assessment     NAME:  Garima Taylor  YOB: 1975  MEDICAL RECORD NUMBER:  [id number]    The patient is being assessed for  Admission    I agree that at least one RN has performed a thorough Head to Toe Skin Assessment on the patient. ALL assessment sites listed below have been assessed.      Areas assessed by both nurses:    Head, Face, Ears, Shoulders, Back, Chest, Arms, Elbows, Hands, Sacrum. Buttock, Coccyx, Ischium, Legs. Feet and Heels, and Under Medical Devices         Does the Patient have a Wound? No noted wound(s) Incision on Left Heels.          Bean Prevention initiated by RN: Baltazar  Wound Care Orders initiated by RN: No    Pressure Injury (Stage 3,4, Unstageable, DTI, NWPT, and Complex wounds) if present, place Wound referral order by RN under : No    New Ostomies, if present place, Ostomy referral order under : No     Nurse 1 eSignature: Electronically signed by Cody Llanes RN on 5/31/25 at 6:31 PM EDT    **SHARE this note so that the co-signing nurse can place an eSignature**    Nurse 2 eSignature: Electronically signed by Frances Stevenson RN on 5/31/25 at 10:19 PM EDT

## 2025-05-31 NOTE — PROGRESS NOTES
Patient is A&Ox4. VSS this shift with exception of intermittent blood pressure changes. Patient has endorsed pain to left heel/foot managed per MAR and non-pharm measures. Patient is tolerating PO diet. Tolerating ambulation x1 assist with gait belt and scooter. Voiding via BRP. Patient updated on plan of care. Fall and safety precautions in place, call light within reach. Plan of care ongoing.

## 2025-05-31 NOTE — PROGRESS NOTES
Patient transferred to  per order/policy. Report given to TIMI Ross. All belongings with patient. Personal medications sent down to patient.

## 2025-06-01 VITALS
BODY MASS INDEX: 47.57 KG/M2 | SYSTOLIC BLOOD PRESSURE: 146 MMHG | TEMPERATURE: 98 F | HEIGHT: 65 IN | OXYGEN SATURATION: 97 % | DIASTOLIC BLOOD PRESSURE: 74 MMHG | WEIGHT: 285.5 LBS | HEART RATE: 73 BPM | RESPIRATION RATE: 16 BRPM

## 2025-06-01 LAB
ANION GAP SERPL CALCULATED.3IONS-SCNC: 12 MMOL/L (ref 3–16)
ANION GAP SERPL CALCULATED.3IONS-SCNC: 12 MMOL/L (ref 3–16)
BASOPHILS # BLD: 0 K/UL (ref 0–0.2)
BASOPHILS NFR BLD: 0.6 %
BUN SERPL-MCNC: 14 MG/DL (ref 7–20)
BUN SERPL-MCNC: 14 MG/DL (ref 7–20)
CALCIUM SERPL-MCNC: 8.7 MG/DL (ref 8.3–10.6)
CALCIUM SERPL-MCNC: 8.9 MG/DL (ref 8.3–10.6)
CHLORIDE SERPL-SCNC: 103 MMOL/L (ref 99–110)
CHLORIDE SERPL-SCNC: 104 MMOL/L (ref 99–110)
CO2 SERPL-SCNC: 23 MMOL/L (ref 21–32)
CO2 SERPL-SCNC: 25 MMOL/L (ref 21–32)
CREAT SERPL-MCNC: 0.8 MG/DL (ref 0.6–1.1)
CREAT SERPL-MCNC: 0.9 MG/DL (ref 0.6–1.1)
DEPRECATED RDW RBC AUTO: 15.2 % (ref 12.4–15.4)
EOSINOPHIL # BLD: 0.2 K/UL (ref 0–0.6)
EOSINOPHIL NFR BLD: 2 %
FERRITIN SERPL IA-MCNC: 36.8 NG/ML (ref 15–150)
GFR SERPLBLD CREATININE-BSD FMLA CKD-EPI: 78 ML/MIN/{1.73_M2}
GFR SERPLBLD CREATININE-BSD FMLA CKD-EPI: 90 ML/MIN/{1.73_M2}
GLUCOSE SERPL-MCNC: 106 MG/DL (ref 70–99)
GLUCOSE SERPL-MCNC: 95 MG/DL (ref 70–99)
HCT VFR BLD AUTO: 31.8 % (ref 36–48)
HGB BLD-MCNC: 11 G/DL (ref 12–16)
IRON SATN MFR SERPL: 13 % (ref 15–50)
IRON SERPL-MCNC: 41 UG/DL (ref 37–145)
LYMPHOCYTES # BLD: 3.5 K/UL (ref 1–5.1)
LYMPHOCYTES NFR BLD: 42.3 %
MAGNESIUM SERPL-MCNC: 1.63 MG/DL (ref 1.8–2.4)
MCH RBC QN AUTO: 27.2 PG (ref 26–34)
MCHC RBC AUTO-ENTMCNC: 34.5 G/DL (ref 31–36)
MCV RBC AUTO: 78.9 FL (ref 80–100)
MONOCYTES # BLD: 0.5 K/UL (ref 0–1.3)
MONOCYTES NFR BLD: 5.5 %
NEUTROPHILS # BLD: 4.2 K/UL (ref 1.7–7.7)
NEUTROPHILS NFR BLD: 49.6 %
PLATELET # BLD AUTO: 170 K/UL (ref 135–450)
PMV BLD AUTO: 7.3 FL (ref 5–10.5)
POTASSIUM SERPL-SCNC: 3.4 MMOL/L (ref 3.5–5.1)
POTASSIUM SERPL-SCNC: 4.1 MMOL/L (ref 3.5–5.1)
RBC # BLD AUTO: 4.03 M/UL (ref 4–5.2)
SODIUM SERPL-SCNC: 139 MMOL/L (ref 136–145)
SODIUM SERPL-SCNC: 140 MMOL/L (ref 136–145)
TIBC SERPL-MCNC: 306 UG/DL (ref 260–445)
WBC # BLD AUTO: 8.4 K/UL (ref 4–11)

## 2025-06-01 PROCEDURE — 6370000000 HC RX 637 (ALT 250 FOR IP): Performed by: ORTHOPAEDIC SURGERY

## 2025-06-01 PROCEDURE — 6360000002 HC RX W HCPCS: Performed by: ORTHOPAEDIC SURGERY

## 2025-06-01 PROCEDURE — 2500000003 HC RX 250 WO HCPCS: Performed by: INTERNAL MEDICINE

## 2025-06-01 PROCEDURE — 83550 IRON BINDING TEST: CPT

## 2025-06-01 PROCEDURE — 36415 COLL VENOUS BLD VENIPUNCTURE: CPT

## 2025-06-01 PROCEDURE — 87641 MR-STAPH DNA AMP PROBE: CPT

## 2025-06-01 PROCEDURE — 6360000002 HC RX W HCPCS: Performed by: INTERNAL MEDICINE

## 2025-06-01 PROCEDURE — 83735 ASSAY OF MAGNESIUM: CPT

## 2025-06-01 PROCEDURE — 82728 ASSAY OF FERRITIN: CPT

## 2025-06-01 PROCEDURE — 2580000003 HC RX 258: Performed by: INTERNAL MEDICINE

## 2025-06-01 PROCEDURE — 6370000000 HC RX 637 (ALT 250 FOR IP): Performed by: INTERNAL MEDICINE

## 2025-06-01 PROCEDURE — 83540 ASSAY OF IRON: CPT

## 2025-06-01 PROCEDURE — 85025 COMPLETE CBC W/AUTO DIFF WBC: CPT

## 2025-06-01 PROCEDURE — 80048 BASIC METABOLIC PNL TOTAL CA: CPT

## 2025-06-01 RX ORDER — LANOLIN ALCOHOL/MO/W.PET/CERES
400 CREAM (GRAM) TOPICAL ONCE
Status: COMPLETED | OUTPATIENT
Start: 2025-06-01 | End: 2025-06-01

## 2025-06-01 RX ORDER — POTASSIUM CHLORIDE 1500 MG/1
40 TABLET, EXTENDED RELEASE ORAL ONCE
Status: COMPLETED | OUTPATIENT
Start: 2025-06-01 | End: 2025-06-01

## 2025-06-01 RX ADMIN — POTASSIUM CHLORIDE 40 MEQ: 1500 TABLET, EXTENDED RELEASE ORAL at 08:24

## 2025-06-01 RX ADMIN — Medication 400 MG: at 14:41

## 2025-06-01 RX ADMIN — METFORMIN ER 500 MG 1000 MG: 500 TABLET ORAL at 08:26

## 2025-06-01 RX ADMIN — PANTOPRAZOLE SODIUM 40 MG: 40 TABLET, DELAYED RELEASE ORAL at 08:24

## 2025-06-01 RX ADMIN — CETIRIZINE HYDROCHLORIDE 10 MG: 10 TABLET, FILM COATED ORAL at 08:24

## 2025-06-01 RX ADMIN — SODIUM CHLORIDE, PRESERVATIVE FREE 10 ML: 5 INJECTION INTRAVENOUS at 10:47

## 2025-06-01 RX ADMIN — SODIUM CHLORIDE 1500 MG: 0.9 INJECTION, SOLUTION INTRAVENOUS at 01:49

## 2025-06-01 RX ADMIN — ALOGLIPTIN 25 MG: 25 TABLET, FILM COATED ORAL at 08:26

## 2025-06-01 RX ADMIN — ROSUVASTATIN CALCIUM 10 MG: 20 TABLET, FILM COATED ORAL at 08:24

## 2025-06-01 RX ADMIN — SPIRONOLACTONE 25 MG: 25 TABLET ORAL at 08:24

## 2025-06-01 RX ADMIN — GABAPENTIN 100 MG: 100 CAPSULE ORAL at 08:24

## 2025-06-01 RX ADMIN — HEPARIN SODIUM 5000 UNITS: 5000 INJECTION INTRAVENOUS; SUBCUTANEOUS at 08:25

## 2025-06-01 RX ADMIN — CEFEPIME 2000 MG: 2 INJECTION, POWDER, FOR SOLUTION INTRAVENOUS at 04:41

## 2025-06-01 RX ADMIN — HYDROCODONE BITARTRATE AND ACETAMINOPHEN 1 TABLET: 5; 325 TABLET ORAL at 04:50

## 2025-06-01 RX ADMIN — HYDROMORPHONE HYDROCHLORIDE 0.25 MG: 1 INJECTION, SOLUTION INTRAMUSCULAR; INTRAVENOUS; SUBCUTANEOUS at 01:42

## 2025-06-01 RX ADMIN — CEFEPIME 2000 MG: 2 INJECTION, POWDER, FOR SOLUTION INTRAVENOUS at 14:34

## 2025-06-01 RX ADMIN — HYDROCODONE BITARTRATE AND ACETAMINOPHEN 1 TABLET: 5; 325 TABLET ORAL at 17:02

## 2025-06-01 RX ADMIN — METOPROLOL SUCCINATE 50 MG: 25 TABLET, FILM COATED, EXTENDED RELEASE ORAL at 08:24

## 2025-06-01 RX ADMIN — TIZANIDINE 4 MG: 4 TABLET ORAL at 10:51

## 2025-06-01 RX ADMIN — HYDROCODONE BITARTRATE AND ACETAMINOPHEN 1 TABLET: 5; 325 TABLET ORAL at 10:46

## 2025-06-01 ASSESSMENT — PAIN SCALES - GENERAL
PAINLEVEL_OUTOF10: 5
PAINLEVEL_OUTOF10: 4
PAINLEVEL_OUTOF10: 0
PAINLEVEL_OUTOF10: 4
PAINLEVEL_OUTOF10: 0
PAINLEVEL_OUTOF10: 6
PAINLEVEL_OUTOF10: 3
PAINLEVEL_OUTOF10: 6
PAINLEVEL_OUTOF10: 5
PAINLEVEL_OUTOF10: 6
PAINLEVEL_OUTOF10: 6

## 2025-06-01 ASSESSMENT — PAIN DESCRIPTION - ORIENTATION
ORIENTATION: LEFT
ORIENTATION: LEFT

## 2025-06-01 ASSESSMENT — PAIN DESCRIPTION - LOCATION
LOCATION: FOOT
LOCATION: FOOT

## 2025-06-01 ASSESSMENT — PAIN DESCRIPTION - DESCRIPTORS
DESCRIPTORS: THROBBING
DESCRIPTORS: STABBING

## 2025-06-01 NOTE — PROGRESS NOTES
[facility] Progress note    S: doing well, pain is controlled  Not happy with the food    O: Labs reassuring, glucose controlled  Vitals:    05/31/25 1721 05/31/25 1918 05/31/25 2120 05/31/25 2242   BP: 112/65  128/73    Pulse: 70  78    Resp: 18 18 18 18   Temp: 98.4 °F (36.9 °C)  98.8 °F (37.1 °C)    TempSrc: Oral  Oral    SpO2: 95%  96%    Weight: 129.5 kg (285 lb 7.9 oz)      Height: 1.651 m (5' 5\")          A/P  Left hardware deep tissue infection, hx of left subtalar joint arthrodesis on 1/30/2025, she had issues with healing the wounds over the calcaneal screws  left foot incision and debridement for OM calcaneus along with removal of hardware with Dr. Gillespie on 05/29/2025  FAIZA - on CPAP  Hx of PCOS  Hypertension  Insulin resistance, not diabetes  Hyperlipidemia  Morbid obesity Body mass index is 47.83 kg/m². - Complicating assessment and treatment. Placing patient at risk for multiple co-morbidities as well as early death and contributing to the patient's presentation.  on weight loss when appropriate.     Plan  Pain meds being guided by orthopedic surgery and well controlled  Heparin sq for DVT Px  Broad Spectrum Abx coverage per ID recs, cx with pseudomonas growth  S/p PICC placement and CM arranging home Abx  Continue home BP meds  Ok for metformin as BG controlled on that, she is not diabetic     Deidra Gomez MD  Hospitalist  Attending Physician

## 2025-06-01 NOTE — CONSULTS
V2.0  Consult received  Discussed with RN, MARGIE, Received block so pain is controlled in operative extremity  She has headache, and is on gabapentin, norco and dilaudid for breakthrough pain  Already seen by ID  Will see in am  Please call with ? Or concerns  
    V2.0  [facility] [place] Consult Note      Name:  Garima Taylor /Age/Sex: 1975  (49 y.o. female)   MRN & CSN:  [id number] & [id number] Encounter Date/Time: 2025 11:23 AM EDT   [facility]  [facility]- PCP: Rimma Nava MD     Attending:Roland Gillespei,*  Consulting Provider: Deidra Gomez MD      Hospital Day: 2    Assessment and Recommendations       Hospital Problems           Last Modified POA    * (Principal) Chronic osteomyelitis of left foot (HCC) 2025 Yes     Left hardware deep tissue infection, hx of left subtalar joint arthrodesis on 2025, she had issues with healing the wounds over the calcaneal screws  left foot incision and debridement for OM calcaneus along with removal of hardware with Dr. Gillespie on 2025  FAIZA - on CPAP  Hx of PCOS  Hypertension  Insulin resistance, not diabetes  Hyperlipidemia  Morbid obesity Body mass index is 47.83 kg/m². - Complicating assessment and treatment. Placing patient at risk for multiple co-morbidities as well as early death and contributing to the patient's presentation.  on weight loss when appropriate.    Plan  Continue IP care  Pain meds being guided by orthopedic surgery  Heparin sq for DVT Px  Broad Spectrum Abx coverage  PICC to be placed for OPAT  Continue home BP meds  Ok for metformin as BG controlled on that, she is not diabetic       Diet ADULT DIET; Regular; 4 carb choices (60 gm/meal)   DVT Prophylaxis [] Lovenox, [x]  Heparin, [] SCDs, [] Ambulation,  [] Eliquis, [] Xarelto   Code Status Full Code   Surrogate Decision Maker/ TAVO   CarmitaCody (Child)     Personally reviewed Lab Studies and Imaging   Discussed management of the case with consulting team, CM  Plan for OPAT, ID will give Recs  Monitoring for adverse effect of IV opiates, added narcan prn    History From:    History obtained from chart review and the patient.     History of Present Illness:      Chief Complaint: admitted for surgical 
Clinical Pharmacy Progress Note    Vancomycin has been discontinued - Pharmacy will sign off on dosing  If resumed, please re-consult Pharmacy    Please call with any questions,  Brook Da Silva, PharmD  PGY1 Pharmacy Resident  Wireless: [phone]  6/1/2025 10:07 AM      
hardware infection and presumed osteomyelitis has hardware in communication with bone.  Discussed OPAT with patient, she is of agreement.  She had some concerns about being able to work.  Discussed limitations with PICC line and PICC care.     Multiple antibiotic allergies:  - Patient has reported allergies to levofloxacin in the form of rash, she also developed rash and closing throat with Bactrim, states this was within 20 minutes of taking the antibiotic.    - Also listed rash with Keflex and diarrhea with amoxicillin but states that this was taken at the same time when she was in a stressful situation and likely the symptoms were attributable to that.      Medical Decision Making:  The following items were considered in medical decision making:  Discussion of patient care with other providers  Reviewed clinical lab tests  Reviewed radiology tests  Reviewed other diagnostic tests/interventions  Independent review of radiologic images  Microbiology cultures and other micro tests reviewed      Risk of Complications/Morbidity: High   Illness(es)/ Infection present that pose threat to bodily function.   There is potential for severe exacerbation of infection/side effects of treatment.  Therapy requires intensive monitoring for antimicrobial agent toxicity    Please note that this chart was generated using Dragon dictation software. Although every effort was made to ensure the accuracy of this automated transcription, some errors in transcription may have occurred inadvertently.  Any pictures or media included in this note were obtained after taking informed verbal consent from the patient and with their approval to include those in the patient's medical record.     Discussed with patient, her boyfriend and RN at bedside, primary team, orthopedic LIEN Martines.  Charmaine Villafuerte MD

## 2025-06-01 NOTE — PROGRESS NOTES
[facility] -  Clinical Pharmacy Note    Vancomycin - Management by Pharmacy    Consult Date(s): 5/30/25  Consulting Provider(s): He    Assessment / Plan  L foot hardware infection/OM - Vancomycin  Concurrent Antimicrobials: Cefepime  Day of Vanc Therapy / Ordered Duration: 4  IV ABX planned through 7/10  Current Dosing Method: Bayesian-Guided AUC Dosing  Therapeutic Goal: -600 mg/L*hr  Current Dose / Plan:   Renal function stable / baseline - SCr 0.8  Currently receiving 1500 mg IV q12h  Predicted AUC = 521 mg/L*hr ; Trough = 11.8 mg/L  Will plan to continue current regimen  Will plan to obtain repeat level ~48h if remains on vancomycin unless otherwise clinically indicated as pt is stable with extended duration of IV ax  Will discuss with ID plans with c/s results  Will continue to monitor clinical condition and make adjustments to regimen as appropriate.    Thank you for consulting pharmacy,    Please call with any questions,  Brook Da Silva, PharmD  PGY1 Pharmacy Resident  Wireless: [phone]  6/1/2025 8:02 AM        Interval update:  cx + pseudomonas (fluid and tissue), sensitive ; transferred to  overnight ; hypertensive this AM with increased pain ; plans for dapto + cefepime on dc through 7/10 ; PICC in place ; discharge pending outpt abx arrangements ; PS to ID to discuss de-escalation    Subjective/Objective:   Garima Taylor is a 49 y.o. female with a PMHx significant for asthma, PCOS, L subtalar joint arthrodesis (Jan 2025) admitted 5/29 with planned admission for L foot I&D for presumed osteomyelitis (done 5/29).    Pharmacy is consulted to dose Vancomycin .    Ht Readings from Last 1 Encounters:   05/31/25 1.651 m (5' 5\")     Wt Readings from Last 1 Encounters:   05/31/25 129.5 kg (285 lb 7.9 oz)     Current & Prior Antimicrobial Regimen(s):  Cefepime (5/29-current)  Vancomycin - Pharmacy to dose  2500mg IV x1 5/29  1500mg IV q12h (5/30-current)    Vancomycin Level(s) / Doses:    Date

## 2025-06-01 NOTE — PROGRESS NOTES
Patient called RN. She stated that she would like to change floors. She feels uneasy on 3S and requested a new bed. House supervisor contacted. RN was informed that current bed availability is prohibitive for a transfer. The house supervisor informed the RN they would note this request and make an ongoing attempt to accommodate the patient request. Patient informed of current status. RN will update upon any notable changes.

## 2025-06-01 NOTE — DISCHARGE SUMMARY
[place] DISCHARGE SUMMARY         The patient was taken to the operating room on 5/29/25 where the aforementioned procedure was preformed.  The patient was taken to the post operative anesthesia recovery unit in stable condition. The patient was then transferred to the orthopaedic floor for post operative pain management and convalesce       The patient was followed medically in the hospital for the above surgical procedures performed and below medical issues during their hospital stay    Principal Problem:    Chronic osteomyelitis of left foot (HCC)  Resolved Problems:    * No resolved hospital problems. *         (x )The patient was placed on anticoagulation therapy for DVT prophylaxis -- Aspirin 325 mg twice daily      The patient was discharged in stable condition on 6/1/2025.     Please see medical reconciliation for discharge medications.    The discharge instructions were explained to the patient and the family.  The patient will follow up in the office at her scheduled post-op appointments.

## 2025-06-01 NOTE — CARE COORDINATION
Case Management Assessment            Discharge Note                    Date / Time of Note: 6/1/2025 8:44 AM                  Discharge Note Completed by: Erika Nolasco RN    Patient Name: Garima Taylor   YOB: 1975  Diagnosis: Other mechanical complication of internal fixation device of bone of foot, sequela [T84.293S]  Chronic osteomyelitis of left foot (HCC) [M86.672]   Date / Time: 5/29/2025  5:32 AM    Current PCP: Rimma Nava MD  Clinic patient: No    Hospitalization in the last 30 days: No       Advance Directives:  Code Status: Full Code  [place] DNR form completed and on chart: Not Indicated    Financial:  Payor: [employer] / Plan: UNITED HEALTHC[employer] - CHOICE PLUS / Product Type: *No Product type* /      Pharmacy:    [facility]/pharmacy #[id number] - [place], [place] - [address] - P [phone] - F [phone]  [facility] [address]  [facility] [address]  Phone: [phone] Fax: [phone]      Assistance purchasing medications?:    Assistance provided by Case Management: None at this time    Does patient want to participate in local refill/ meds to beds program?: Yes    Meds To Beds General Rules:  1. Can ONLY be done Monday- Friday between 8:30am-5pm  2. Prescription(s) must be in pharmacy by 3pm to be filled same day  3.Copy of patient's insurance/ prescription drug card and patient face sheet must be sent along with the prescription(s)  4. Cost of Rx cannot be added to hospital bill. If financial assistance is needed, please contact unit  or ;  or  CANNOT provide pharmacy voucher for patients co-pays  5. Patients can then  the prescription on their way out of the hospital at discharge, or pharmacy can deliver to the bedside if staff is available. (payment due at time of pick-up or delivery - cash, check, or card accepted)     Able to afford home medications/ co-pay costs: Yes    ADLS:  Current PT AM-PAC Score:   
DME:    Patient expects to discharge to: House  Plan for transportation at discharge: Self    Financial    Payor: [employer] / Plan: UNITED HEALTHCARE - CHOICE PLUS / Product Type: *No Product type* /     Does insurance require precert for SNF: Yes    Potential assistance Purchasing Medications:    Meds-to-Beds request: Yes      [facility]pharmacy [id number] - [place], [place] - [address] - P [phone] - F [phone]  [address]  [facility] [address]  Phone: [phone] Fax: [phone]      Notes:    Factors facilitating achievement of predicted outcomes: Family support, Motivated, and Cooperative    Barriers to discharge: Pain    Additional Case Management Notes: Patient is from home with family, independent pta.  Patient plans to return home at discharge, has needed DME.  Patient received picc line, IV abx planned for discharge home.  CM sent referrals for home care and IV abx.  [facility] able to accept for home care.  [employer] is running pt's benefits for co-pay information.      1:57 PM  Patient covered for medication through [employer] at %100.  CM called in orders to pharmacy, left voicemail for Felecia.  Patient will need to have doses of antibiotic on Sunday for start of care on Monday.     The Plan for Transition of Care is related to the following treatment goals of Other mechanical complication of internal fixation device of bone of foot, sequela [T84.293S]  Chronic osteomyelitis of left foot (HCC) [M86.672]    IF APPLICABLE: The Patient and/or patient representative Garima and her family were provided with a choice of provider and agrees with the discharge plan. Freedom of choice list with basic dialogue that supports the patient's individualized plan of care/goals and shares the quality data associated with the providers was provided to: Patient   Patient Representative Name:       The Patient and/or Patient Representative Agree with the Discharge Plan? Yes    Erika

## 2025-06-01 NOTE — PROGRESS NOTES
USACS Progress note    S: doing well, pain is controlled  Concerned about her iron levels    O: Labs reassuring, glucose controlled  Vitals:    05/31/25 2242 06/01/25 0212 06/01/25 0456 06/01/25 0520   BP:   (!) 153/76    Pulse:   77    Resp: 18 16 17 16   Temp:   98.3 °F (36.8 °C)    TempSrc:   Oral    SpO2:   98%    Weight:       Height:           A/P  Hypokalemia   Left hardware deep tissue infection, hx of left subtalar joint arthrodesis on 1/30/2025, she had issues with healing the wounds over the calcaneal screws  left foot incision and debridement for OM calcaneus along with removal of hardware with Dr. Gillespie on 05/29/2025  FAIZA - on CPAP  Hx of PCOS  Hypertension  Insulin resistance, not diabetes  Hyperlipidemia  Morbid obesity Body mass index is 47.83 kg/m². - Complicating assessment and treatment. Placing patient at risk for multiple co-morbidities as well as early death and contributing to the patient's presentation.  on weight loss when appropriate.     Plan  Replace K   Check Mag  Keep K > 4, Mag > 2.0  Check Iron studies as hx and patient requested  Pain meds being guided by orthopedic surgery and well controlled  Heparin sq for DVT Px  [employer] Abx coverage per ID recs, cx with pseudomonas growth  S/p PICC placement and CM arranging home Abx  Continue home BP meds  Ok for metformin as BG controlled on that, she is not diabetic   Ok to dc once ok with Ortho and home Abx arranged    Deidra Gomez MD  Hospitalist  Attending Physician

## 2025-06-01 NOTE — PLAN OF CARE
Problem: Discharge Planning  Goal: Discharge to home or other facility with appropriate resources  5/29/2025 2249 by Monse Grayson RN  Outcome: Progressing  Flowsheets (Taken 5/29/2025 2249)  Discharge to home or other facility with appropriate resources:   Identify barriers to discharge with patient and caregiver   Arrange for needed discharge resources and transportation as appropriate  Note: Pt. awaiting cultures & SNIF placement.  5/29/2025 2243 by Monse Grayson RN  Outcome: Progressing  5/29/2025 2003 by Gina Paez RN  Outcome: Progressing  Flowsheets (Taken 5/29/2025 2003)  Discharge to home or other facility with appropriate resources: Identify barriers to discharge with patient and caregiver  Note: PT/OT pending.       Problem: Pain  Goal: Verbalizes/displays adequate comfort level or baseline comfort level  5/29/2025 2249 by Monse Grayson RN  Outcome: Progressing  Flowsheets (Taken 5/29/2025 2243)  Verbalizes/displays adequate comfort level or baseline comfort level:   Encourage patient to monitor pain and request assistance   Assess pain using appropriate pain scale   Implement non-pharmacological measures as appropriate and evaluate response   Administer analgesics based on type and severity of pain and evaluate response   Notify Licensed Independent Practitioner if interventions unsuccessful or patient reports new pain  Note: Pt. currently not in pain, block is starting to ware off, as pt. reports she is starting to feel tingling. No PRN pain meds given at this time.   5/29/2025 2243 by Monse Grayson RN  Outcome: Progressing  Flowsheets (Taken 5/29/2025 2243)  Verbalizes/displays adequate comfort level or baseline comfort level:   Encourage patient to monitor pain and request assistance   Assess pain using appropriate pain scale   Implement non-pharmacological measures as appropriate and evaluate response   Administer analgesics based on type and severity of 
  Problem: Discharge Planning  Goal: Discharge to home or other facility with appropriate resources  5/31/2025 0235 by Ivette Arenas RN  Outcome: Progressing  Flowsheets (Taken 5/29/2025 2249 by Monse Grayson RN)  Discharge to home or other facility with appropriate resources:   Identify barriers to discharge with patient and caregiver   Arrange for needed discharge resources and transportation as appropriate  Note: Patient  plans to discharge home with support of family.     Problem: Pain  Goal: Verbalizes/displays adequate comfort level or baseline comfort level  5/31/2025 0235 by Ivette Arenas RN  Outcome: Progressing  Flowsheets (Taken 5/29/2025 2243 by Monse Grayson, RN)  Verbalizes/displays adequate comfort level or baseline comfort level:   Encourage patient to monitor pain and request assistance   Assess pain using appropriate pain scale   Implement non-pharmacological measures as appropriate and evaluate response   Administer analgesics based on type and severity of pain and evaluate response   Notify Licensed Independent Practitioner if interventions unsuccessful or patient reports new pain  Note: Pain managed per MAR       Problem: Safety - Adult  Goal: Free from fall injury  5/31/2025 0235 by Ivette Arenas RN  Outcome: Progressing  Flowsheets (Taken 5/29/2025 2243 by Monse Grayson RN)  Free From Fall Injury: Instruct family/caregiver on patient safety  Note: All fall / safety precautions in place. Bed locked in low position, call light within reach.     Problem: Infection - Adult  Goal: Absence of infection at discharge  5/31/2025 0235 by Ivette Arenas RN  Outcome: Progressing     Problem: Infection - Adult  Goal: Absence of infection during hospitalization  5/30/2025 2020 by Gina Paez RN  Outcome: Progressing  Flowsheets (Taken 5/29/2025 2249 by Monse Grayson RN)  Absence of infection during hospitalization:   Assess and monitor for signs and 
  Problem: Discharge Planning  Goal: Discharge to home or other facility with appropriate resources  6/1/2025 0534 by Phani Saldana RN  Outcome: Progressing     Problem: Pain  Goal: Verbalizes/displays adequate comfort level or baseline comfort level  6/1/2025 0534 by Phani Saldana RN  Outcome: Progressing     Problem: Safety - Adult  Goal: Free from fall injury  6/1/2025 0534 by Phnai Saldana RN  Outcome: Progressing     Problem: ABCDS Injury Assessment  Goal: Absence of physical injury  6/1/2025 0534 by Phani Saldana RN  Outcome: Progressing     Problem: Musculoskeletal - Adult  Goal: Return mobility to safest level of function  6/1/2025 0534 by Phani Saldana RN  Outcome: Progressing     Problem: Musculoskeletal - Adult  Goal: Return ADL status to a safe level of function  6/1/2025 0534 by Phani Saldana RN  Outcome: Progressing     Problem: Infection - Adult  Goal: Absence of infection at discharge  6/1/2025 0534 by Phani Saldana RN  Outcome: Progressing     Problem: Infection - Adult  Goal: Absence of infection during hospitalization  Outcome: Progressing  Flowsheets (Taken 5/31/2025 1800 by Cody Llanes RN)  Absence of infection during hospitalization: Assess and monitor for signs and symptoms of infection     Problem: Infection - Adult  Goal: Absence of fever/infection during anticipated neutropenic period  Outcome: Progressing     Problem: Skin/Tissue Integrity - Adult  Goal: Incisions, wounds, or drain sites healing without S/S of infection  6/1/2025 0534 by Phani Saldana RN  Outcome: Progressing     
  Problem: Discharge Planning  Goal: Discharge to home or other facility with appropriate resources  Outcome: Progressing     Problem: Pain  Goal: Verbalizes/displays adequate comfort level or baseline comfort level  Outcome: Progressing   Pt endorsing pain to left foot. Being treated with PRN pain medication, rest, and frequent repositioning with pillow support for comfort and pressure relief. Pt reports some relief from pain with above interventions.     Problem: Safety - Adult  Goal: Free from fall injury  Outcome: Progressing   All fall precautions in place. Bed locked and in lowest position with alarm on. Overbed table and personal belonings within reach. Call light within reach and patient instructed to use call light for assistance. Non-skid socks on.     Problem: ABCDS Injury Assessment  Goal: Absence of physical injury  Outcome: Progressing   Patient encouraged to communicate with nurse with ambulation needs. All safety equipment at bedside.     Problem: Musculoskeletal - Adult  Goal: Return mobility to safest level of function  Outcome: Progressing   Patient able to ambulate well x1 with gait belt and scooter     Problem: Musculoskeletal - Adult  Goal: Return ADL status to a safe level of function  Outcome: Progressing    Patient able to ambulate well x1 with gait belt and scooter     Problem: Infection - Adult  Goal: Absence of infection at discharge  Outcome: Progressing   Patient continues to be treated for infection with IVABx and will continue after discharge.     Problem: Skin/Tissue Integrity - Adult  Goal: Incisions, wounds, or drain sites healing without S/S of infection  Outcome: Progressing   Skin is CDI. Magdalena care completed as necessary. Repositioning to reduce pressure and prevent injury.   
  Problem: Discharge Planning  Goal: Discharge to home or other facility with appropriate resources  Outcome: Progressing  Flowsheets (Taken 5/29/2025 2003)  Discharge to home or other facility with appropriate resources: Identify barriers to discharge with patient and caregiver  Note: PT/OT pending.       Problem: Pain  Goal: Verbalizes/displays adequate comfort level or baseline comfort level  Outcome: Progressing  Flowsheets (Taken 5/29/2025 2003)  Verbalizes/displays adequate comfort level or baseline comfort level:   Encourage patient to monitor pain and request assistance   Administer analgesics based on type and severity of pain and evaluate response   Assess pain using appropriate pain scale   Implement non-pharmacological measures as appropriate and evaluate response  Note: Medications available per MAR.      Problem: Safety - Adult  Goal: Free from fall injury  Outcome: Progressing  Flowsheets (Taken 5/29/2025 2003)  Free From Fall Injury: Instruct family/caregiver on patient safety  Note: Fall precautions in place. Call light left within reach. Plan of care continues.      Problem: Musculoskeletal - Adult  Goal: Return mobility to safest level of function  Outcome: Progressing  Flowsheets (Taken 5/29/2025 2004)  Return Mobility to Safest Level of Function:   Assess patient stability and activity tolerance for standing, transferring and ambulating with or without assistive devices   Obtain physical therapy/occupational therapy consults as needed  Note: Partial weight bearing to LLE. Ambulated to bathroom 1x gait belt and walker.  Goal: Return ADL status to a safe level of function  Outcome: Progressing  Flowsheets (Taken 5/29/2025 2004)  Return ADL Status to a Safe Level of Function: Obtain physical therapy/occupational therapy consults as needed  Note: Partial weight bearing to LLE. Ambulated to bathroom 1x gait belt and walker.     Problem: Infection - Adult  Goal: Absence of infection at 
  Problem: Discharge Planning  Goal: Discharge to home or other facility with appropriate resources  Outcome: Progressing  Flowsheets (Taken 5/29/2025 2249 by Monse Grayson, RN)  Discharge to home or other facility with appropriate resources:   Identify barriers to discharge with patient and caregiver   Arrange for needed discharge resources and transportation as appropriate  Note: Discharging possibly this Sunday or Monday.     Problem: Pain  Goal: Verbalizes/displays adequate comfort level or baseline comfort level  Outcome: Progressing  Flowsheets (Taken 5/29/2025 2243 by Monse Grayson, RN)  Verbalizes/displays adequate comfort level or baseline comfort level:   Encourage patient to monitor pain and request assistance   Assess pain using appropriate pain scale   Implement non-pharmacological measures as appropriate and evaluate response   Administer analgesics based on type and severity of pain and evaluate response   Notify Licensed Independent Practitioner if interventions unsuccessful or patient reports new pain  Note: Managing pain with medications per MAR.      Problem: Safety - Adult  Goal: Free from fall injury  Outcome: Progressing  Flowsheets (Taken 5/29/2025 2243 by Monse Grayson, RN)  Free From Fall Injury: Instruct family/caregiver on patient safety  Note: Fall precautions in place. Call light left within reach. Plan of care continues.     Problem: Musculoskeletal - Adult  Goal: Return mobility to safest level of function  Outcome: Progressing  Flowsheets (Taken 5/29/2025 2004)  Return Mobility to Safest Level of Function:   Assess patient stability and activity tolerance for standing, transferring and ambulating with or without assistive devices   Obtain physical therapy/occupational therapy consults as needed  Note: 1x gait belt and knee scooter.  Goal: Return ADL status to a safe level of function  Outcome: Progressing  Flowsheets (Taken 5/29/2025 2004)  Return ADL Status to 
  Problem: Pain  Goal: Verbalizes/displays adequate comfort level or baseline comfort level  5/29/2025 2243 by Monse Grayson RN  Outcome: Progressing  Flowsheets (Taken 5/29/2025 2243)  Verbalizes/displays adequate comfort level or baseline comfort level:   Encourage patient to monitor pain and request assistance   Assess pain using appropriate pain scale   Implement non-pharmacological measures as appropriate and evaluate response   Administer analgesics based on type and severity of pain and evaluate response   Notify Licensed Independent Practitioner if interventions unsuccessful or patient reports new pain  Note: Pain being addressed per MAR, using PRN pain medications. Pt. uses pain scale appropriately.    
Discharge  Flowsheets (Taken 6/1/2025 0830)  Absence of fever/infection during anticipated neutropenic period: Monitor white blood cell count  6/1/2025 0534 by Phani Saldana RN  Outcome: Progressing     Problem: Skin/Tissue Integrity - Adult  Goal: Incisions, wounds, or drain sites healing without S/S of infection  6/1/2025 1716 by Ade Whitfield RN  Outcome: Adequate for Discharge  Flowsheets (Taken 6/1/2025 0830)  Incisions, Wounds, or Drain Sites Healing Without Sign and Symptoms of Infection:   Implement wound care per orders   TWICE DAILY: Assess and document skin integrity  6/1/2025 0534 by Phani Saldana, RN  Outcome: Progressing

## 2025-06-01 NOTE — PROGRESS NOTES
Ortho Progress Note     POD 3 s/p I&D left foot and removal of hardware.  She has multiple questions regarding her recovery.  Cultures with pseudomonas growth.     Dressings intact -- no sanguinous drainage.  Brisk capillary refill in all digits.  FHL/EHL intact.     Continue NWB on operative extremity.  Continue with daily wound care -- wash foot with antibacterial soap/water and dry foot.  Pack wound on heel with 1/4\" Iodoform gauze and cover with a wet-to-dry dressing, 4x4s, rolled gauze, and ACE.  IV antibiotics per ID.  D/c to home today after dressings have been changed and she has had her antibiotics for today.

## 2025-06-01 NOTE — PROGRESS NOTES
Left message informing patient follow up appointment scheduled 6/13/2022 needs to be rescheduled since MBB# 2 sched 6/9/2022 was canceled. Provided call back number.    Patient resting in bed, Cisneros Fall Risk: High (45 and higher), Pain Level: 7 , norco given for pain control, vitals stable, assisted to position of comfort, call light and belongings in reach, encouraged to call with needs,   Vitals:    05/31/25 2120   BP: 128/73   Pulse: 78   Resp: 18   Temp: 98.8 °F (37.1 °C)   SpO2: 96%

## 2025-06-01 NOTE — PROGRESS NOTES
Discharged via wheelchair with belongings. Patient denies questions or concerns regarding discharge or medications. Denies pain or needs. No distress noted. Wound care supplies sent home with patient. Patient verbalized her understanding on picc line care and wound care.

## 2025-06-02 ENCOUNTER — TELEPHONE (OUTPATIENT)
Dept: INFECTIOUS DISEASES | Age: 50
End: 2025-06-02

## 2025-06-02 ENCOUNTER — TELEPHONE (OUTPATIENT)
Dept: FAMILY MEDICINE CLINIC | Age: 50
End: 2025-06-02

## 2025-06-02 DIAGNOSIS — M86.172 ACUTE OSTEOMYELITIS OF LEFT FOOT (HCC): Primary | ICD-10-CM

## 2025-06-02 LAB — MRSA DNA SPEC QL NAA+PROBE: NORMAL

## 2025-06-02 NOTE — TELEPHONE ENCOUNTER
Spoke with Mike at [facility] and Nadege CAPELLAN at [facility] and verified OPAT orders:    IV Cefepime 6g continuous infusion daily   - Planned End date: 7/10/2025  CBC w diff, CMP, ESR, CRP     Spoke with patient.  She reports she is doing good.  [facility] RN coming this afternoon for SOC.  Reports she has a f/u appt with Dr. Gillespie next week.

## 2025-06-02 NOTE — TELEPHONE ENCOUNTER
Care Transitions Initial Follow Up Call    Outreach made within 2 business days of discharge: Yes    Patient: Garima Taylor Patient : 1975   MRN: [id number]  Reason for Admission: Chronic osteomyelitis of left foot (HCC)   Discharge Date: 25       Spoke with: Garima     Discharge department/facility: [facility]     TCM Interactive Patient Contact:  Was patient able to fill all prescriptions: Yes  Was patient instructed to bring all medications to the follow-up visit: Yes  Is patient taking all medications as directed in the discharge summary? Yes  Does patient understand their discharge instructions: Yes  Does patient have questions or concerns that need addressed prior to 7-14 day follow up office visit: no    Additional needs identified to be addressed with provider  No needs identified             Scheduled appointment with PCP within 7-14 days    Follow Up  Future Appointments   Date Time Provider Department Center   2025 10:40 AM Rimma Nava MD [facility] E[facility] DEP   2025 10:40 AM Rimma Nava MD [facility] DEP     Needs VV due to foot infection. Pt was told by the surgeon not to go out in public a lot.   Danika Ryan MA

## 2025-06-03 LAB
BACTERIA SPEC AEROBE CULT: ABNORMAL
BACTERIA SPEC AEROBE CULT: ABNORMAL
BACTERIA SPEC ANAEROBE CULT: ABNORMAL
BACTERIA SPEC ANAEROBE CULT: ABNORMAL
GRAM STN SPEC: ABNORMAL
GRAM STN SPEC: ABNORMAL
ORGANISM: ABNORMAL
ORGANISM: ABNORMAL

## 2025-06-06 ENCOUNTER — TELEMEDICINE (OUTPATIENT)
Dept: FAMILY MEDICINE CLINIC | Age: 50
End: 2025-06-06

## 2025-06-06 DIAGNOSIS — E28.2 PCOS (POLYCYSTIC OVARIAN SYNDROME): ICD-10-CM

## 2025-06-06 DIAGNOSIS — R03.0 BLOOD PRESSURE ELEVATED WITHOUT HISTORY OF HTN: ICD-10-CM

## 2025-06-06 DIAGNOSIS — D50.9 IRON DEFICIENCY ANEMIA, UNSPECIFIED IRON DEFICIENCY ANEMIA TYPE: ICD-10-CM

## 2025-06-06 DIAGNOSIS — M86.672 CHRONIC OSTEOMYELITIS OF LEFT FOOT (HCC): Primary | ICD-10-CM

## 2025-06-06 DIAGNOSIS — A49.8 PSEUDOMONAS AERUGINOSA INFECTION: ICD-10-CM

## 2025-06-06 DIAGNOSIS — K21.9 GASTROESOPHAGEAL REFLUX DISEASE WITHOUT ESOPHAGITIS: ICD-10-CM

## 2025-06-06 DIAGNOSIS — F90.9 ATTENTION DEFICIT HYPERACTIVITY DISORDER (ADHD), UNSPECIFIED ADHD TYPE: ICD-10-CM

## 2025-06-06 DIAGNOSIS — Z09 HOSPITAL DISCHARGE FOLLOW-UP: Chronic | ICD-10-CM

## 2025-06-06 RX ORDER — FERROUS SULFATE 325(65) MG
325 TABLET, DELAYED RELEASE (ENTERIC COATED) ORAL
Qty: 45 TABLET | Refills: 0 | Status: SHIPPED | OUTPATIENT
Start: 2025-06-06

## 2025-06-06 RX ORDER — PANTOPRAZOLE SODIUM 20 MG/1
20 TABLET, DELAYED RELEASE ORAL DAILY
Qty: 30 TABLET | Refills: 1 | Status: SHIPPED | OUTPATIENT
Start: 2025-06-06

## 2025-06-06 NOTE — PATIENT INSTRUCTIONS
Post menopausal women need a total of 1200 mg of calcium a day.  You would want to add up with you get in your diet and see if you have a deficiency.  If you do need to take calcium supplements try not to take more than 600 mg at a time.  It is okay to take Tums which is calcium carbonate.  You should not have to spend a lot of money for calcium supplements    A lot of people are low with their vitamin D levels so try to take 1000 international units a day of vitamin D3  ( it is over the counter)    Please start taking an iron pill.  It is called  ferrous sulfate 325 mg over the counter. Take one daily with  a vitamin c pill  or juice. ( watch the sugar in juice so if you are diabetic or prediabetic avoid drinking juice).  Repeat iron studies in   6 weeks then follow up with me for an office appointment.  The iron may turn the stool dark and cause constipation so you may need a stool softener or laxative.  You can use miralax or colace if needed.

## 2025-06-06 NOTE — PROGRESS NOTES
Post-Discharge Transitional Care  Follow Up      Garima Taylor   YOB: 1975    Date of Office Visit:  6/6/2025  Date of Hospital Admission: 5/29/25  Date of Hospital Discharge: 6/1/25  Risk of hospital readmission (high >=14%. Medium >=10%) :Readmission Risk Score: 7.1      Care management risk score Rising risk (score 2-5) and Complex Care (Scores >=6): No Risk Score On File     Non face to face  following discharge, date last encounter closed (first attempt may have been earlier): 06/02/2025    Call initiated 2 business days of discharge: Yes    ASSESSMENT/PLAN:   Chronic osteomyelitis of left foot (HCC)  Comments:  ID involved  cefepine  Hospital discharge follow-up  -     AK DISCHARGE MEDS RECONCILED W/ CURRENT OUTPATIENT MED LIST  Gastroesophageal reflux disease without esophagitis  Comments:  try to get down protonix to help protect bones  20 /daily for 3 weeks  Attention deficit hyperactivity disorder (ADHD), unspecified ADHD type  PCOS (polycystic ovarian syndrome) metformin and spironolactone  Pseudomonas aeruginosa infection  Comments:  on cefepime  Iron deficiency anemia, unspecified iron deficiency anemia type  Blood pressure elevated without history of HTN    She requested I rx the gabapentin   ( Dr Muhammad is rxing)  I declined   Patient Instructions   Post menopausal women need a total of 1200 mg of calcium a day.  You would want to add up with you get in your diet and see if you have a deficiency.  If you do need to take calcium supplements try not to take more than 600 mg at a time.  It is okay to take Tums which is calcium carbonate.  You should not have to spend a lot of money for calcium supplements    A lot of people are low with their vitamin D levels so try to take 1000 international units a day of vitamin D3  ( it is over the counter)    Please start taking an iron pill.  It is called  ferrous sulfate 325 mg over the counter. Take one daily with  a vitamin c pill  or juice. (

## 2025-06-09 ENCOUNTER — TELEPHONE (OUTPATIENT)
Dept: INFECTIOUS DISEASES | Age: 50
End: 2025-06-09

## 2025-06-09 LAB
ALBUMIN: 4.5 G/DL
ALP BLD-CCNC: 62 U/L
ALT SERPL-CCNC: 24 U/L
ANION GAP SERPL CALCULATED.3IONS-SCNC: ABNORMAL MMOL/L
AST SERPL-CCNC: 21 U/L
BASOPHILS ABSOLUTE: 0.1 /ΜL
BASOPHILS RELATIVE PERCENT: 1 %
BILIRUB SERPL-MCNC: 0.4 MG/DL (ref 0.1–1.4)
BUN BLDV-MCNC: 16 MG/DL
C-REACTIVE PROTEIN: 5
CALCIUM SERPL-MCNC: 9.5 MG/DL
CHLORIDE BLD-SCNC: 100 MMOL/L
CO2: 28 MMOL/L
CREAT SERPL-MCNC: 0.71 MG/DL
EOSINOPHILS ABSOLUTE: 0.1 /ΜL
EOSINOPHILS RELATIVE PERCENT: 1 %
FUNGUS SPEC CULT: NORMAL
FUNGUS SPEC CULT: NORMAL
GFR, ESTIMATED: 104
GLUCOSE BLD-MCNC: 119 MG/DL
HCT VFR BLD CALC: 37.8 % (ref 36–46)
HEMOGLOBIN: 12.4 G/DL (ref 12–16)
LOEFFLER MB STN SPEC: NORMAL
LOEFFLER MB STN SPEC: NORMAL
LYMPHOCYTES ABSOLUTE: 2.9 /ΜL
LYMPHOCYTES RELATIVE PERCENT: 34 %
MCH RBC QN AUTO: 26.2 PG
MCHC RBC AUTO-ENTMCNC: 32.9 G/DL
MCV RBC AUTO: 79.8 FL
MONOCYTES ABSOLUTE: 0.5 /ΜL
MONOCYTES RELATIVE PERCENT: 6 %
NEUTROPHILS ABSOLUTE: 4.8 /ΜL
NEUTROPHILS RELATIVE PERCENT: 58 %
PDW BLD-RTO: 15.6 %
PLATELET # BLD: 165 K/ΜL
PMV BLD AUTO: 8.5 FL
POTASSIUM SERPL-SCNC: 3.7 MMOL/L
RBC # BLD: 4.73 10^6/ΜL
SED RATE, AUTOMATED: 26
SODIUM BLD-SCNC: 137 MMOL/L
TOTAL PROTEIN: 6.7 G/DL (ref 6.4–8.2)
WBC # BLD: 8.3 10^3/ML

## 2025-06-09 NOTE — TELEPHONE ENCOUNTER
OPAT Nurse Coordinator Weekly Update Note    Current OPAT plan:  IV Cefepime 6g continuous infusion daily   - Planned End date: 7/10/2025    Diagnosis:  Left foot hardware infection/osteomyelitis    Assessment:  Patient reports she is tired.  She denies fevers.  Her drsg has a scant amount of drainage noted.  C RN at house now for drsg change and labs.  Patient adherent to NWB status.     Follow up appts:  Dr. Gillespie 6/11

## 2025-06-10 DIAGNOSIS — M86.172 ACUTE OSTEOMYELITIS OF LEFT FOOT (HCC): ICD-10-CM

## 2025-06-10 LAB
ACID FAST STN SPEC QL: NORMAL
MYCOBACTERIUM SPEC CULT: NORMAL

## 2025-06-11 NOTE — TELEPHONE ENCOUNTER
Received call from patient who reports she started with very itchy hives with welts yesterday morning around 10 AM.    She reports it is predominantly on her Left leg, neck, chin, inside her arms and in between her fingers but they are everywhere as well.     She did take benadryl and they did go down a little and are flat now.      She denies being short of breath, but reports that her esophagus was \"hurting\".  She reports it felt like it was the inside like how acid reflux feels.  It did go away with drinking hot tea.      She reports she stopped her IV abx yesterday at 10PM and is not sure what she should do.  She has taken Benadryl last PM at 8PM, 2AM and is getting ready to take another dose.      She has an appt with Dr. Gillespie today at 3:40PM

## 2025-06-11 NOTE — TELEPHONE ENCOUNTER
Ensure there is no new detergents, cleaning supplies, environmental factors etc but this sounds like it could be a delayed hypersensitivity reaction to cefepime.    Stop cefepime.     She can take benadryl or zyrtec (less sedating than benadryl) for symptoms. If they don't go away within 48 hours, I can rx prednisone.     For alternative pseudomonas coverage, we can either switch to IV pip/tazo 18gm/24hr as CI or IV meropenem 1gm q8hr as IV push. Whichever patient prefers. I note rash with cephalexin and diarrhea with amox but we can still proceed with either pip/tazo or macy and monitor.     Also, I note, \"rash\" to levofloxacin which would be our only PO option if she needs suppression in the future.     Jaz Erazo, PharmD, [facility]  Clinical Pharmacy Specialist- [facility]  Phone: [phone] (also available on Epic!)  Fax: [phone]    For Pharmacy Admin Tracking Only    Program: Medical Group  CPA in place: Yes  Intervention Detail: Discontinued Rx: 1, reason: DUSTIN and New Rx: 1, reason: Needs Additional Therapy  Time Spent (min): 15

## 2025-06-11 NOTE — TELEPHONE ENCOUNTER
Spoke with patient.  She reports she has not used any new detergents, cleaning supplies, environmental factors.      Patient advised to stop Cefepime.  She states she stopped this last PM.    I advised of pharmacist note She can take benadryl or zyrtec (less sedating than benadryl) for symptoms. If they don't go away within 48 hours, I can rx prednisone. She v/u and will call if it does not go away    Patient opted to switch to IV pip/tazo 18gm/24hr as CI.      Spoke with Mike at [facility] and advised to d/c cefepime and start IV pip/tazo 18gm/24hr as CI. All labs to remain the same.  Will send brenda kit.    Spoke with Nadege CAPELLAN at [facility] and advised of above.  She reprots ok for 1st dose in home.

## 2025-06-12 ENCOUNTER — HOSPITAL ENCOUNTER (INPATIENT)
Age: 50
LOS: 3 days | Discharge: HOME | DRG: 540 | End: 2025-06-15
Attending: EMERGENCY MEDICINE | Admitting: FAMILY MEDICINE
Payer: COMMERCIAL

## 2025-06-12 DIAGNOSIS — L50.9 URTICARIA: Primary | ICD-10-CM

## 2025-06-12 DIAGNOSIS — R60.0 BILATERAL LOWER EXTREMITY EDEMA: ICD-10-CM

## 2025-06-12 DIAGNOSIS — Z79.2 RECEIVING INTRAVENOUS ANTIBIOTIC TREATMENT AS OUTPATIENT: ICD-10-CM

## 2025-06-12 DIAGNOSIS — Z88.1 ALLERGY TO MULTIPLE ANTIBIOTICS: ICD-10-CM

## 2025-06-12 DIAGNOSIS — L50.9 HIVES: ICD-10-CM

## 2025-06-12 DIAGNOSIS — M86.672 CHRONIC OSTEOMYELITIS OF LEFT FOOT (HCC): ICD-10-CM

## 2025-06-12 DIAGNOSIS — R22.0 LIP SWELLING: ICD-10-CM

## 2025-06-12 DIAGNOSIS — M89.572: ICD-10-CM

## 2025-06-12 DIAGNOSIS — M85.80 OSTEOPENIA DETERMINED BY X-RAY: ICD-10-CM

## 2025-06-12 PROBLEM — M86.172 ACUTE OSTEOMYELITIS OF LEFT CALCANEUS (HCC): Status: ACTIVE | Noted: 2025-06-12

## 2025-06-12 PROBLEM — E66.01 MORBID OBESITY WITH BMI OF 45.0-49.9, ADULT (HCC): Status: ACTIVE | Noted: 2025-06-12

## 2025-06-12 PROBLEM — T37.95XA ALLERGIC DRUG RASH DUE TO ANTI-INFECTIVE AGENT: Status: ACTIVE | Noted: 2025-06-12

## 2025-06-12 PROBLEM — L27.0 ALLERGIC DRUG RASH DUE TO ANTI-INFECTIVE AGENT: Status: ACTIVE | Noted: 2025-06-12

## 2025-06-12 PROBLEM — A49.8 PSEUDOMONAS INFECTION: Status: ACTIVE | Noted: 2025-06-12

## 2025-06-12 PROBLEM — T84.7XXA HARDWARE COMPLICATING WOUND INFECTION: Status: ACTIVE | Noted: 2025-06-12

## 2025-06-12 LAB
ANION GAP SERPL CALCULATED.3IONS-SCNC: 12 MMOL/L (ref 3–16)
BASOPHILS # BLD: 0 K/UL (ref 0–0.2)
BASOPHILS NFR BLD: 0.3 %
BUN SERPL-MCNC: 14 MG/DL (ref 7–20)
CALCIUM SERPL-MCNC: 9.7 MG/DL (ref 8.3–10.6)
CHLORIDE SERPL-SCNC: 101 MMOL/L (ref 99–110)
CO2 SERPL-SCNC: 26 MMOL/L (ref 21–32)
CREAT SERPL-MCNC: 0.8 MG/DL (ref 0.6–1.1)
DEPRECATED RDW RBC AUTO: 16.6 % (ref 12.4–15.4)
EOSINOPHIL # BLD: 0.1 K/UL (ref 0–0.6)
EOSINOPHIL NFR BLD: 0.7 %
GFR SERPLBLD CREATININE-BSD FMLA CKD-EPI: 90 ML/MIN/{1.73_M2}
GLUCOSE SERPL-MCNC: 145 MG/DL (ref 70–99)
HCT VFR BLD AUTO: 36.9 % (ref 36–48)
HGB BLD-MCNC: 12.2 G/DL (ref 12–16)
LYMPHOCYTES # BLD: 3.3 K/UL (ref 1–5.1)
LYMPHOCYTES NFR BLD: 21.7 %
MCH RBC QN AUTO: 26.7 PG (ref 26–34)
MCHC RBC AUTO-ENTMCNC: 33 G/DL (ref 31–36)
MCV RBC AUTO: 80.8 FL (ref 80–100)
MONOCYTES # BLD: 0.7 K/UL (ref 0–1.3)
MONOCYTES NFR BLD: 4.4 %
NEUTROPHILS # BLD: 11.2 K/UL (ref 1.7–7.7)
NEUTROPHILS NFR BLD: 72.9 %
PLATELET # BLD AUTO: 184 K/UL (ref 135–450)
PMV BLD AUTO: 8.6 FL (ref 5–10.5)
POTASSIUM SERPL-SCNC: 3.9 MMOL/L (ref 3.5–5.1)
RBC # BLD AUTO: 4.57 M/UL (ref 4–5.2)
SODIUM SERPL-SCNC: 139 MMOL/L (ref 136–145)
WBC # BLD AUTO: 15.4 K/UL (ref 4–11)

## 2025-06-12 PROCEDURE — 6370000000 HC RX 637 (ALT 250 FOR IP)

## 2025-06-12 PROCEDURE — 2500000003 HC RX 250 WO HCPCS: Performed by: EMERGENCY MEDICINE

## 2025-06-12 PROCEDURE — 2500000003 HC RX 250 WO HCPCS: Performed by: FAMILY MEDICINE

## 2025-06-12 PROCEDURE — 6360000002 HC RX W HCPCS: Performed by: INTERNAL MEDICINE

## 2025-06-12 PROCEDURE — 96374 THER/PROPH/DIAG INJ IV PUSH: CPT

## 2025-06-12 PROCEDURE — 6360000002 HC RX W HCPCS: Performed by: EMERGENCY MEDICINE

## 2025-06-12 PROCEDURE — 96376 TX/PRO/DX INJ SAME DRUG ADON: CPT

## 2025-06-12 PROCEDURE — 80048 BASIC METABOLIC PNL TOTAL CA: CPT

## 2025-06-12 PROCEDURE — 2580000003 HC RX 258: Performed by: FAMILY MEDICINE

## 2025-06-12 PROCEDURE — 6370000000 HC RX 637 (ALT 250 FOR IP): Performed by: FAMILY MEDICINE

## 2025-06-12 PROCEDURE — 96375 TX/PRO/DX INJ NEW DRUG ADDON: CPT

## 2025-06-12 PROCEDURE — 6370000000 HC RX 637 (ALT 250 FOR IP): Performed by: STUDENT IN AN ORGANIZED HEALTH CARE EDUCATION/TRAINING PROGRAM

## 2025-06-12 PROCEDURE — 1200000000 HC SEMI PRIVATE

## 2025-06-12 PROCEDURE — 6360000002 HC RX W HCPCS: Performed by: FAMILY MEDICINE

## 2025-06-12 PROCEDURE — 99223 1ST HOSP IP/OBS HIGH 75: CPT | Performed by: INTERNAL MEDICINE

## 2025-06-12 PROCEDURE — 2500000003 HC RX 250 WO HCPCS

## 2025-06-12 PROCEDURE — 6360000002 HC RX W HCPCS: Performed by: STUDENT IN AN ORGANIZED HEALTH CARE EDUCATION/TRAINING PROGRAM

## 2025-06-12 PROCEDURE — 99285 EMERGENCY DEPT VISIT HI MDM: CPT

## 2025-06-12 PROCEDURE — 85025 COMPLETE CBC W/AUTO DIFF WBC: CPT

## 2025-06-12 RX ORDER — OXYCODONE AND ACETAMINOPHEN 5; 325 MG/1; MG/1
1 TABLET ORAL EVERY 4 HOURS PRN
Status: DISCONTINUED | OUTPATIENT
Start: 2025-06-12 | End: 2025-06-12

## 2025-06-12 RX ORDER — METHYLPREDNISOLONE SODIUM SUCCINATE 125 MG/2ML
60 INJECTION INTRAMUSCULAR; INTRAVENOUS ONCE
Status: COMPLETED | OUTPATIENT
Start: 2025-06-12 | End: 2025-06-12

## 2025-06-12 RX ORDER — ONDANSETRON 4 MG/1
4 TABLET, ORALLY DISINTEGRATING ORAL EVERY 8 HOURS PRN
Status: DISCONTINUED | OUTPATIENT
Start: 2025-06-12 | End: 2025-06-15 | Stop reason: HOSPADM

## 2025-06-12 RX ORDER — HYDROCORTISONE SODIUM SUCCINATE 100 MG/2ML
100 INJECTION INTRAMUSCULAR; INTRAVENOUS EVERY 8 HOURS
Status: COMPLETED | OUTPATIENT
Start: 2025-06-12 | End: 2025-06-13

## 2025-06-12 RX ORDER — OXYCODONE AND ACETAMINOPHEN 5; 325 MG/1; MG/1
1 TABLET ORAL EVERY 4 HOURS PRN
Refills: 0 | Status: DISCONTINUED | OUTPATIENT
Start: 2025-06-12 | End: 2025-06-15 | Stop reason: HOSPADM

## 2025-06-12 RX ORDER — HYDROXYZINE PAMOATE 25 MG/1
25 CAPSULE ORAL ONCE
Status: COMPLETED | OUTPATIENT
Start: 2025-06-12 | End: 2025-06-12

## 2025-06-12 RX ORDER — ENOXAPARIN SODIUM 100 MG/ML
30 INJECTION SUBCUTANEOUS 2 TIMES DAILY
Status: DISCONTINUED | OUTPATIENT
Start: 2025-06-12 | End: 2025-06-15 | Stop reason: HOSPADM

## 2025-06-12 RX ORDER — ONDANSETRON 2 MG/ML
4 INJECTION INTRAMUSCULAR; INTRAVENOUS EVERY 6 HOURS PRN
Status: DISCONTINUED | OUTPATIENT
Start: 2025-06-12 | End: 2025-06-15 | Stop reason: HOSPADM

## 2025-06-12 RX ORDER — FERROUS SULFATE 324(65)MG
325 TABLET, DELAYED RELEASE (ENTERIC COATED) ORAL
Status: DISCONTINUED | OUTPATIENT
Start: 2025-06-13 | End: 2025-06-15 | Stop reason: HOSPADM

## 2025-06-12 RX ORDER — ROSUVASTATIN CALCIUM 10 MG/1
10 TABLET, COATED ORAL DAILY
Status: DISCONTINUED | OUTPATIENT
Start: 2025-06-12 | End: 2025-06-15 | Stop reason: HOSPADM

## 2025-06-12 RX ORDER — METOPROLOL SUCCINATE 50 MG/1
100 TABLET, EXTENDED RELEASE ORAL DAILY
Status: DISCONTINUED | OUTPATIENT
Start: 2025-06-12 | End: 2025-06-15 | Stop reason: HOSPADM

## 2025-06-12 RX ORDER — POTASSIUM CHLORIDE 1500 MG/1
40 TABLET, EXTENDED RELEASE ORAL PRN
Status: DISCONTINUED | OUTPATIENT
Start: 2025-06-12 | End: 2025-06-15 | Stop reason: HOSPADM

## 2025-06-12 RX ORDER — PANTOPRAZOLE SODIUM 20 MG/1
20 TABLET, DELAYED RELEASE ORAL DAILY
Status: DISCONTINUED | OUTPATIENT
Start: 2025-06-12 | End: 2025-06-15 | Stop reason: HOSPADM

## 2025-06-12 RX ORDER — POTASSIUM CHLORIDE 7.45 MG/ML
10 INJECTION INTRAVENOUS PRN
Status: DISCONTINUED | OUTPATIENT
Start: 2025-06-12 | End: 2025-06-15 | Stop reason: HOSPADM

## 2025-06-12 RX ORDER — DIPHENHYDRAMINE HYDROCHLORIDE 50 MG/ML
25 INJECTION, SOLUTION INTRAMUSCULAR; INTRAVENOUS ONCE
Status: COMPLETED | OUTPATIENT
Start: 2025-06-12 | End: 2025-06-12

## 2025-06-12 RX ORDER — WATER 10 ML/10ML
INJECTION INTRAMUSCULAR; INTRAVENOUS; SUBCUTANEOUS
Status: COMPLETED
Start: 2025-06-12 | End: 2025-06-12

## 2025-06-12 RX ORDER — MAGNESIUM SULFATE IN WATER 40 MG/ML
2000 INJECTION, SOLUTION INTRAVENOUS PRN
Status: DISCONTINUED | OUTPATIENT
Start: 2025-06-12 | End: 2025-06-15 | Stop reason: HOSPADM

## 2025-06-12 RX ORDER — SODIUM CHLORIDE 9 MG/ML
INJECTION, SOLUTION INTRAVENOUS PRN
Status: DISCONTINUED | OUTPATIENT
Start: 2025-06-12 | End: 2025-06-15 | Stop reason: HOSPADM

## 2025-06-12 RX ORDER — ACETAMINOPHEN 650 MG/1
650 SUPPOSITORY RECTAL EVERY 6 HOURS PRN
Status: DISCONTINUED | OUTPATIENT
Start: 2025-06-12 | End: 2025-06-15 | Stop reason: HOSPADM

## 2025-06-12 RX ORDER — SODIUM CHLORIDE 0.9 % (FLUSH) 0.9 %
5-40 SYRINGE (ML) INJECTION EVERY 12 HOURS SCHEDULED
Status: DISCONTINUED | OUTPATIENT
Start: 2025-06-12 | End: 2025-06-15 | Stop reason: HOSPADM

## 2025-06-12 RX ORDER — SODIUM CHLORIDE 0.9 % (FLUSH) 0.9 %
5-40 SYRINGE (ML) INJECTION PRN
Status: DISCONTINUED | OUTPATIENT
Start: 2025-06-12 | End: 2025-06-15 | Stop reason: HOSPADM

## 2025-06-12 RX ORDER — POLYETHYLENE GLYCOL 3350 17 G/17G
17 POWDER, FOR SOLUTION ORAL DAILY PRN
Status: DISCONTINUED | OUTPATIENT
Start: 2025-06-12 | End: 2025-06-15 | Stop reason: HOSPADM

## 2025-06-12 RX ORDER — HYDROXYZINE PAMOATE 25 MG/1
25 CAPSULE ORAL 3 TIMES DAILY PRN
Status: DISPENSED | OUTPATIENT
Start: 2025-06-12 | End: 2025-06-14

## 2025-06-12 RX ORDER — ACETAMINOPHEN 325 MG/1
650 TABLET ORAL EVERY 6 HOURS PRN
Status: DISCONTINUED | OUTPATIENT
Start: 2025-06-12 | End: 2025-06-15 | Stop reason: HOSPADM

## 2025-06-12 RX ORDER — SPIRONOLACTONE 25 MG/1
25 TABLET ORAL DAILY
Status: DISCONTINUED | OUTPATIENT
Start: 2025-06-12 | End: 2025-06-15 | Stop reason: HOSPADM

## 2025-06-12 RX ADMIN — OXYCODONE AND ACETAMINOPHEN 1 TABLET: 5; 325 TABLET ORAL at 10:15

## 2025-06-12 RX ADMIN — OXYCODONE AND ACETAMINOPHEN 1 TABLET: 5; 325 TABLET ORAL at 18:12

## 2025-06-12 RX ADMIN — FAMOTIDINE 20 MG: 10 INJECTION, SOLUTION INTRAVENOUS at 05:47

## 2025-06-12 RX ADMIN — HYDROCORTISONE SODIUM SUCCINATE 100 MG: 100 INJECTION, POWDER, FOR SOLUTION INTRAMUSCULAR; INTRAVENOUS at 16:43

## 2025-06-12 RX ADMIN — MEROPENEM 1000 MG: 1 INJECTION INTRAVENOUS at 20:20

## 2025-06-12 RX ADMIN — SPIRONOLACTONE 25 MG: 25 TABLET ORAL at 10:15

## 2025-06-12 RX ADMIN — ROSUVASTATIN CALCIUM 10 MG: 10 TABLET, FILM COATED ORAL at 10:15

## 2025-06-12 RX ADMIN — PANTOPRAZOLE SODIUM 20 MG: 20 TABLET, DELAYED RELEASE ORAL at 10:15

## 2025-06-12 RX ADMIN — METOPROLOL SUCCINATE 100 MG: 50 TABLET, EXTENDED RELEASE ORAL at 11:58

## 2025-06-12 RX ADMIN — DIPHENHYDRAMINE HYDROCHLORIDE 25 MG: 50 INJECTION INTRAMUSCULAR; INTRAVENOUS at 05:41

## 2025-06-12 RX ADMIN — HYDROXYZINE PAMOATE 25 MG: 25 CAPSULE ORAL at 06:59

## 2025-06-12 RX ADMIN — ENOXAPARIN SODIUM 30 MG: 100 INJECTION SUBCUTANEOUS at 20:21

## 2025-06-12 RX ADMIN — WATER 0.96 ML: 1 INJECTION INTRAMUSCULAR; INTRAVENOUS; SUBCUTANEOUS at 05:43

## 2025-06-12 RX ADMIN — HYDROXYZINE PAMOATE 25 MG: 25 CAPSULE ORAL at 21:13

## 2025-06-12 RX ADMIN — MEROPENEM 1000 MG: 1 INJECTION INTRAVENOUS at 11:56

## 2025-06-12 RX ADMIN — SODIUM CHLORIDE, PRESERVATIVE FREE 10 ML: 5 INJECTION INTRAVENOUS at 20:22

## 2025-06-12 RX ADMIN — METHYLPREDNISOLONE SODIUM SUCCINATE 60 MG: 125 INJECTION, POWDER, LYOPHILIZED, FOR SOLUTION INTRAMUSCULAR; INTRAVENOUS at 05:41

## 2025-06-12 RX ADMIN — METHYLPREDNISOLONE SODIUM SUCCINATE 60 MG: 125 INJECTION, POWDER, LYOPHILIZED, FOR SOLUTION INTRAMUSCULAR; INTRAVENOUS at 07:39

## 2025-06-12 RX ADMIN — ENOXAPARIN SODIUM 30 MG: 100 INJECTION SUBCUTANEOUS at 10:15

## 2025-06-12 RX ADMIN — SODIUM CHLORIDE, PRESERVATIVE FREE 10 ML: 5 INJECTION INTRAVENOUS at 11:58

## 2025-06-12 ASSESSMENT — PAIN SCALES - GENERAL
PAINLEVEL_OUTOF10: 4
PAINLEVEL_OUTOF10: 0
PAINLEVEL_OUTOF10: 7
PAINLEVEL_OUTOF10: 4
PAINLEVEL_OUTOF10: 6

## 2025-06-12 ASSESSMENT — PAIN DESCRIPTION - DESCRIPTORS: DESCRIPTORS: ACHING;THROBBING

## 2025-06-12 ASSESSMENT — PAIN DESCRIPTION - LOCATION
LOCATION: FOOT

## 2025-06-12 ASSESSMENT — PAIN DESCRIPTION - ORIENTATION
ORIENTATION: LEFT

## 2025-06-12 ASSESSMENT — PAIN - FUNCTIONAL ASSESSMENT: PAIN_FUNCTIONAL_ASSESSMENT: 0-10

## 2025-06-12 ASSESSMENT — LIFESTYLE VARIABLES
HOW OFTEN DO YOU HAVE A DRINK CONTAINING ALCOHOL: MONTHLY OR LESS
HOW MANY STANDARD DRINKS CONTAINING ALCOHOL DO YOU HAVE ON A TYPICAL DAY: 1 OR 2

## 2025-06-12 NOTE — CARE COORDINATION
Case Management Assessment  Initial Evaluation    Date/Time of Evaluation: 6/12/2025 3:49 PM  Assessment Completed by: DARRELL Hirsch    If patient is discharged prior to next notation, then this note serves as note for discharge by case management.    Patient Name: Garima Taylor                   YOB: 1975  Diagnosis: Osteolysis of left foot [M89.572]                   Date / Time: 6/12/2025  5:02 AM    Patient Admission Status: Inpatient   Readmission Risk (Low < 19, Mod (19-27), High > 27): Readmission Risk Score: 14.3    Current PCP: Rimma Nava MD  PCP verified by CM? (P) Yes    Chart Reviewed: Yes      History Provided by: (P) Patient  Patient Orientation: (P) Alert and Oriented, Person, Place, Situation, Self    Patient Cognition: (P) Alert    Hospitalization in the last 30 days (Readmission):  Yes    If yes, Readmission Assessment in  Navigator will be completed.    Advance Directives:      Code Status: Full Code   Patient's Primary Decision Maker is: (P) Legal Next of Kin    Primary Decision Maker: Cody Vizcarra  Child - [phone]    Discharge Planning:    Patient lives with: (P) Children (Resides with adult children and 16 year old dgtr) Type of Home: (P) House  Primary Care Giver: (P) Self  Patient Support Systems include: (P) Spouse/Significant Other, Children, Friends/Neighbors   Current Financial resources: (P) Other (Comment) (Commerical insurance)  Current community resources: (P) ECF/Home Care (Active with [facility] for RN and [facility] for home infusion)  Current services prior to admission: (P) Durable Medical Equipment, Home Care, Home Infusion            Current DME: (P) Other (Comment) (Knee scooter)            Type of Home Care services:  (P) Nursing Services, IV Therapy    ADLS  Prior functional level: (P) Independent in ADLs/IADLs  Current functional level: (P) Independent in ADLs/IADLs    PT AM-PAC:   /24  OT AM-PAC:   /24    Family can provide

## 2025-06-12 NOTE — ED PROVIDER NOTES
Signout:  Garima Taylor 49 y.o. female with a chief complaint of Allergic Reaction (Pt to ED for allergic reaction. She has a swollen lip, hives, welts, and itchiness. Pt thinks she is allergic to cefepime which she was recently taking or fish oil.  )   received in sign-out.      Vitals:    06/12/25 0500 06/12/25 0529   BP: 138/75    Pulse: 93    Resp: 16    Temp: 98.6 °F (37 °C)    TempSrc: Oral    SpO2: 93%    Weight:  131.2 kg (289 lb 3.9 oz)   Height: 1.651 m (5' 5\")        ED Course as of 06/12/25 1241   Thu Jun 12, 2025   0618 49-year-old female being treated with antibiotics for osteomyelitis of the heel currently on cefepime here today with a suspected allergic reaction, x 3, has nonspecific hives throughout the body, but then began having lip swelling and a cough early this morning.  Received Solu-Medrol, Benadryl, Pepcid.  Plan to observe patient.  Disposition pending workup findings and reassessment. [BT]   0619 No stridor no concerns for airway at this time [BT]   0708 Per nursing staff patient now having itching again, they report improvement with initial supportive care measures.  I provided patient with some hydroxyzine.  Will continue to reassess the patient on my exam has urticarial rash in the upper extremities that she is itching.  Symptoms still consistent with allergic reaction [BT]      ED Course User Index  [BT] Kennedy Black MD     I spoke with infectious disease regarding this patient, they are recommending admission to medicine where they can determine if patient is started on meropenem they did not want the patient to be started on Zosyn at this time given the allergic reaction she is having    Care of this patient was turned over to me at the time of shift change.  Previous provider was Dr. Johnson. I have discussed the patient with the previous provider and agree with the findings and plan as documented in the providers note.  I fully participate in the care of this patient.  I

## 2025-06-12 NOTE — ED PROVIDER NOTES
EMERGENCY DEPARTMENT ENCOUNTER     [facility] EMERGENCY DEPARTMENT     Pt Name: Garima Taylor   MRN: [id number]   Birthdate 1975   Date of evaluation: 2025   Provider: Chris Johnson MD   PCP: Rimma Nava MD   Note Started: 5:20 AM EDT 25     CHIEF COMPLAINT     No chief complaint on file.       HISTORY OF PRESENT ILLNESS:          Garima Taylor is a 49 y.o. female who presents for evaluation of hives and swelling of the lips.  Patient is currently undergoing treatment for osteomyelitis and is on cefepime.  She states that she began developing symptoms a few days prior to presentation  that worsened over the night.  States he does have allergic reaction to Levaquin and Bactrim.  Never had symptoms like this before.  She denies difficulty speaking difficulties breathing or shortness of breath but does report having a dry cough.  She denies fevers nausea or vomiting.  Patient did call her physician the previous day and was instructed to discontinue cefepime.  Based on documentation it appears the patient will be started to start piperacillin/tazobactam. 18mg/24hr.     Patient states that he has an EpiPen in the past.     Nursing Notes were all reviewed and agreed with or any disagreements were addressed in the HPI.     ROS: Positives and Pertinent negatives as per HPI.    PAST MEDICAL HISTORY     Past medical history:  has a past medical history of Anemia, Anesthesia (), Ankle pain, Asthma, Fatty liver, Hypertension, Kidney stones, PCOS (polycystic ovarian syndrome), Prolonged emergence from general anesthesia, Sleep apnea, Tachycardia, and Wears glasses.    Past surgical history:  has a past surgical history that includes  section; Dilation and curettage of uterus; Endometrial ablation; Upper gastrointestinal endoscopy (N/A, 2024); Colonoscopy; Nasal fracture surgery (); Foot surgery (Left, 2025); Foot surgery (Left, 2025); Gastrocnemius Recession

## 2025-06-12 NOTE — PLAN OF CARE
Problem: Discharge Planning  Goal: Discharge to home or other facility with appropriate resources  Outcome: Progressing  Flowsheets (Taken 6/12/2025 1101)  Discharge to home or other facility with appropriate resources:   Identify barriers to discharge with patient and caregiver   Arrange for needed discharge resources and transportation as appropriate     Problem: Pain  Goal: Verbalizes/displays adequate comfort level or baseline comfort level  Outcome: Progressing     Problem: ABCDS Injury Assessment  Goal: Absence of physical injury  Outcome: Progressing

## 2025-06-12 NOTE — CONSULTS
with prior surgeries. AND \"does have some issues with anesthesia and she will discuss that with the anesthesiologist. does have some issues with anesthesia and she will discuss that with the anesthesiologist.\"    Ankle pain     PCOS (polycystic ovarian syndrome) metformin and spironolactone 2025   Gastroesophageal reflux disease without esophagitis  protonix 2025  Bilateral lower extremity edema lasix 2025  ADHD , 2024,Tibialis posterior tendinitis, right 2021,Right foot pain 2021 ,Right ankle pain 2021,Plantar fasciitis of right foot2019    Asthma     Fatty liver     Hypertension     Kidney stones     PCOS (polycystic ovarian syndrome)     Prolonged emergence from general anesthesia     \"SLOW TO WAKE\" prefers to be left alone- do not rouse too soon\"    Sleep apnea     uses cpap    Tachycardia     Wears glasses        Past Surgical History:    Past Surgical History:   Procedure Laterality Date     SECTION      x3    COLONOSCOPY      DILATION AND CURETTAGE OF UTERUS      ENDOMETRIAL ABLATION      FOOT SURGERY Left 2025    LEFT: SUBTALAR JOINT ARTHRODESIS, POSTERIOR TIBIAL TENDON RECONSTRUCTION, FLEXOR TENDON TRANSFER, GASTROCNEMIUS RECESSION performed by Roland Gillespie MD at [facility] OR    FOOT SURGERY Left 2025    . performed by Roland Gillespie MD at [facility] OR    FOOT SURGERY Left 2025    LEFT REMOVAL OF HEEL SCREWS performed by Roland Gillespie MD at [facility] OR    GASTROCNEMIUS RECESSION Left 2025    . performed by Roland Gillespie MD at [facility] OR    NASAL FRACTURE SURGERY      UPPER GASTROINTESTINAL ENDOSCOPY N/A 2024    ESOPHAGOGASTRODUODENOSCOPY BIOPSY performed by Juani Hook MD at [facility] ENDOSCOPY       Current Medications:    Outpatient Medications Marked as Taking for the 25 encounter (Hospital Encounter)   Medication Sig Dispense Refill    metFORMIN (GLUCOPHAGE-XR) 500 MG extended release

## 2025-06-12 NOTE — ED NOTES
Pt to bed 6 from [facility].  Pt stable.  Placed in gown and placed on monitor.  PICC placement in R Cephalic d/t Osteomyelitis infection, has been having atx at home by self and home nurse to come and change dressing.  Last dressing changed 6/9/25.  Pt with bra, sock, pants, and t shirt along with scooter.    
Report given to Lily CAPELLAN. DB discussed, all questions answered.   
6/12/25 0547)   sterile water injection (0.96 mLs  Given 6/12/25 0543)   hydrOXYzine pamoate (VISTARIL) capsule 25 mg (25 mg Oral Given 6/12/25 0659)   methylPREDNISolone sodium succ (SOLU-MEDROL) injection 60 mg (60 mg IntraVENous Given 6/12/25 0739)     Last documented pain medication administration: 0830  Pertinent or High Risk Medications/Drips: no   If Yes, please provide details: na  Blood Product Administration: no  If Yes, please provide details: na  Process Protocols/Bundles: N/A    Recommendation  Incomplete STAT orders: na  Overdue Medications: na  Patient Belongings:    Additional Comments: na  If any further questions, please call Sending RN at [phone]      Admitting Unit Notification  Name of person notified and time: Eileen      Electronically signed by: Electronically signed by Eileen Petersen RN on 6/12/2025 at 8:27 AM

## 2025-06-12 NOTE — H&P
V2.0  History and Physical      Name:  Garima Taylor /Age/Sex: 1975  (49 y.o. female)   MRN & CSN:  [id number] & [id number] Encounter Date/Time: 2025 9:38 AM EDT   Location:  S3N-3708/4253-01 PCP: Rimma Nava MD       Hospital Day: 1    Assessment and Plan:   Garima Taylor is a 49 y.o. female with a pmh of asthma, htn who presents with Osteolysis of left foot    Hospital Problems           Last Modified POA    * (Principal) Osteolysis of left foot 2025 Yes       Plan:  Osteomyelitis of left heel  Has been on Cefepime since 2025  S/P hardware removal of left calcaneal screws 2025   Having allergic reaction symptoms   Will start meropenum  Will consult ID for their recommendations       Allergic reaction  Swollen lips, hives, itchy x 2- 3 days  Has been on cefepime since 25  Also recently started on Fish oil   S/P Solu-Mdrol, Benadryl, Pepcid in the ED     3.Morbid Obesity  Presenting with BMI of 48.2  Most likely in the setting of increase caloric intake    Will continue to follow, monitor and manage chronic medical conditions with medications listed below     Disposition:   Current Living situation: Home  Expected Disposition: Home  Estimated D/C: 1-2 days    Diet No diet orders on file   DVT Prophylaxis [x] Lovenox, []  Heparin, [] SCDs, [x] Ambulation,  [] Eliquis, [] Xarelto, [] Coumadin   Code Status Prior   Surrogate Decision Maker/ POA self     Personally reviewed Lab Studies and Imaging           History from:     Patient, ED physician, Medical records     History of Present Illness:     Chief Complaint: allergic reaction   Garima Taylor is a 49 y.o. female with pmh of HTN who presents with allergic reaction symptoms    Pat has a history of osteomyelitis of the right heel. Has been on cefepime since 25. Has a picc line.   Started having allergic reaction symptoms 3 days ago, with swollen lips, hives, and itchy all over.   She also started taking fish oil

## 2025-06-12 NOTE — ACP (ADVANCE CARE PLANNING)
Advance Care Planning   Healthcare Decision Maker:    Primary Decision Maker: Cody Vizcarra - [phone]      Today we documented Decision Maker(s) consistent with Legal Next of Kin hierarchy.       DARRELL Talbot [facility]  [phone]  Electronically signed by DARRELL Hirsch on 6/12/2025 at 3:46 PM

## 2025-06-12 NOTE — CARE COORDINATION
06/12/25 1545   Readmission Assessment   Number of Days since last admission? 8-30 days   Previous Disposition Home with Home Health   Who is being Interviewed Patient   What was the patient's/caregiver's perception as to why they think they needed to return back to the hospital? Other (Comment)  (Allergic reaction to meds)   Did you visit your Primary Care Physician after you left the hospital, before you returned this time? Yes  (Tele visit)   Did you see a specialist, such as Cardiac, Pulmonary, Orthopedic Physician, etc. after you left the hospital? No   Who advised the patient to return to the hospital? Self-referral   Does the patient report anything that got in the way of taking their medications? Yes   What reasons did they give? Other (Comment)  (Allergic reaction)   In our efforts to provide the best possible care to you and others like you, can you think of anything that we could have done to help you after you left the hospital the first time, so that you might not have needed to return so soon? Other (Comment)  (Nothing reported)     DARRELL [facility]  [phone]  Electronically signed by DARRELL Hirsch on 6/12/2025 at 3:46 PM

## 2025-06-13 PROCEDURE — 6370000000 HC RX 637 (ALT 250 FOR IP): Performed by: FAMILY MEDICINE

## 2025-06-13 PROCEDURE — 6360000002 HC RX W HCPCS: Performed by: INTERNAL MEDICINE

## 2025-06-13 PROCEDURE — 2500000003 HC RX 250 WO HCPCS: Performed by: FAMILY MEDICINE

## 2025-06-13 PROCEDURE — 1200000000 HC SEMI PRIVATE

## 2025-06-13 PROCEDURE — 2580000003 HC RX 258: Performed by: FAMILY MEDICINE

## 2025-06-13 PROCEDURE — 6360000002 HC RX W HCPCS: Performed by: FAMILY MEDICINE

## 2025-06-13 PROCEDURE — 6370000000 HC RX 637 (ALT 250 FOR IP): Performed by: INTERNAL MEDICINE

## 2025-06-13 PROCEDURE — 94760 N-INVAS EAR/PLS OXIMETRY 1: CPT

## 2025-06-13 PROCEDURE — 6370000000 HC RX 637 (ALT 250 FOR IP)

## 2025-06-13 PROCEDURE — 99233 SBSQ HOSP IP/OBS HIGH 50: CPT | Performed by: INTERNAL MEDICINE

## 2025-06-13 RX ORDER — ALOGLIPTIN 25 MG/1
25 TABLET, FILM COATED ORAL DAILY
Status: DISCONTINUED | OUTPATIENT
Start: 2025-06-13 | End: 2025-06-15 | Stop reason: HOSPADM

## 2025-06-13 RX ORDER — METFORMIN HYDROCHLORIDE 500 MG/1
1000 TABLET, EXTENDED RELEASE ORAL 2 TIMES DAILY WITH MEALS
Status: DISCONTINUED | OUTPATIENT
Start: 2025-06-13 | End: 2025-06-15 | Stop reason: HOSPADM

## 2025-06-13 RX ORDER — ASPIRIN 325 MG
325 TABLET ORAL
Status: DISCONTINUED | OUTPATIENT
Start: 2025-06-13 | End: 2025-06-13

## 2025-06-13 RX ORDER — FUROSEMIDE 40 MG/1
40 TABLET ORAL DAILY
Status: DISCONTINUED | OUTPATIENT
Start: 2025-06-13 | End: 2025-06-15 | Stop reason: HOSPADM

## 2025-06-13 RX ORDER — PREDNISONE 5 MG/1
5 TABLET ORAL DAILY
Status: DISCONTINUED | OUTPATIENT
Start: 2025-06-13 | End: 2025-06-15 | Stop reason: HOSPADM

## 2025-06-13 RX ORDER — METOPROLOL SUCCINATE 100 MG/1
100 TABLET, EXTENDED RELEASE ORAL DAILY
COMMUNITY

## 2025-06-13 RX ADMIN — ROSUVASTATIN CALCIUM 10 MG: 10 TABLET, FILM COATED ORAL at 08:11

## 2025-06-13 RX ADMIN — MEROPENEM 1000 MG: 1 INJECTION INTRAVENOUS at 04:17

## 2025-06-13 RX ADMIN — OXYCODONE AND ACETAMINOPHEN 1 TABLET: 5; 325 TABLET ORAL at 08:11

## 2025-06-13 RX ADMIN — OXYCODONE AND ACETAMINOPHEN 1 TABLET: 5; 325 TABLET ORAL at 20:55

## 2025-06-13 RX ADMIN — HYDROCORTISONE SODIUM SUCCINATE 100 MG: 100 INJECTION, POWDER, FOR SOLUTION INTRAMUSCULAR; INTRAVENOUS at 00:00

## 2025-06-13 RX ADMIN — ENOXAPARIN SODIUM 30 MG: 100 INJECTION SUBCUTANEOUS at 08:12

## 2025-06-13 RX ADMIN — MEROPENEM 1000 MG: 1 INJECTION INTRAVENOUS at 19:55

## 2025-06-13 RX ADMIN — HYDROXYZINE PAMOATE 25 MG: 25 CAPSULE ORAL at 20:55

## 2025-06-13 RX ADMIN — SODIUM CHLORIDE, PRESERVATIVE FREE 10 ML: 5 INJECTION INTRAVENOUS at 19:45

## 2025-06-13 RX ADMIN — PREDNISONE 5 MG: 5 TABLET ORAL at 16:29

## 2025-06-13 RX ADMIN — METOPROLOL SUCCINATE 100 MG: 50 TABLET, EXTENDED RELEASE ORAL at 08:11

## 2025-06-13 RX ADMIN — MEROPENEM 1000 MG: 1 INJECTION INTRAVENOUS at 13:01

## 2025-06-13 RX ADMIN — OXYCODONE AND ACETAMINOPHEN 1 TABLET: 5; 325 TABLET ORAL at 16:46

## 2025-06-13 RX ADMIN — ENOXAPARIN SODIUM 30 MG: 100 INJECTION SUBCUTANEOUS at 19:45

## 2025-06-13 RX ADMIN — ALOGLIPTIN 25 MG: 25 TABLET, FILM COATED ORAL at 10:05

## 2025-06-13 RX ADMIN — FERROUS SULFATE TAB EC 324 MG (65 MG FE EQUIVALENT) 325 MG: 324 (65 FE) TABLET DELAYED RESPONSE at 08:10

## 2025-06-13 RX ADMIN — METFORMIN HYDROCHLORIDE 1000 MG: 500 TABLET, EXTENDED RELEASE ORAL at 16:29

## 2025-06-13 RX ADMIN — PANTOPRAZOLE SODIUM 20 MG: 20 TABLET, DELAYED RELEASE ORAL at 08:11

## 2025-06-13 RX ADMIN — HYDROCORTISONE SODIUM SUCCINATE 100 MG: 100 INJECTION, POWDER, FOR SOLUTION INTRAMUSCULAR; INTRAVENOUS at 08:10

## 2025-06-13 RX ADMIN — FUROSEMIDE 40 MG: 40 TABLET ORAL at 10:05

## 2025-06-13 RX ADMIN — METFORMIN HYDROCHLORIDE 1000 MG: 500 TABLET, EXTENDED RELEASE ORAL at 10:05

## 2025-06-13 RX ADMIN — SODIUM CHLORIDE, PRESERVATIVE FREE 10 ML: 5 INJECTION INTRAVENOUS at 08:11

## 2025-06-13 RX ADMIN — SPIRONOLACTONE 25 MG: 25 TABLET ORAL at 08:11

## 2025-06-13 ASSESSMENT — PAIN DESCRIPTION - LOCATION
LOCATION: FOOT
LOCATION: FOOT

## 2025-06-13 ASSESSMENT — PAIN DESCRIPTION - DESCRIPTORS: DESCRIPTORS: ACHING;THROBBING

## 2025-06-13 ASSESSMENT — PAIN SCALES - GENERAL
PAINLEVEL_OUTOF10: 2
PAINLEVEL_OUTOF10: 4
PAINLEVEL_OUTOF10: 2
PAINLEVEL_OUTOF10: 4
PAINLEVEL_OUTOF10: 3
PAINLEVEL_OUTOF10: 4
PAINLEVEL_OUTOF10: 5

## 2025-06-13 ASSESSMENT — PAIN SCALES - WONG BAKER
WONGBAKER_NUMERICALRESPONSE: NO HURT
WONGBAKER_NUMERICALRESPONSE: NO HURT

## 2025-06-13 ASSESSMENT — PAIN - FUNCTIONAL ASSESSMENT: PAIN_FUNCTIONAL_ASSESSMENT: PREVENTS OR INTERFERES SOME ACTIVE ACTIVITIES AND ADLS

## 2025-06-13 ASSESSMENT — PAIN DESCRIPTION - PAIN TYPE: TYPE: CHRONIC PAIN

## 2025-06-13 ASSESSMENT — PAIN DESCRIPTION - ONSET: ONSET: ON-GOING

## 2025-06-13 ASSESSMENT — PAIN DESCRIPTION - ORIENTATION: ORIENTATION: LEFT

## 2025-06-13 ASSESSMENT — PAIN DESCRIPTION - FREQUENCY: FREQUENCY: CONTINUOUS

## 2025-06-13 NOTE — PLAN OF CARE
Problem: Discharge Planning  Goal: Discharge to home or other facility with appropriate resources  6/13/2025 0827 by Jenni Cooper RN  Outcome: Progressing  6/13/2025 0110 by Sofía Dupont RN  Outcome: Progressing  6/13/2025 0109 by Sofía Dupont RN  Outcome: Progressing     Problem: Pain  Goal: Verbalizes/displays adequate comfort level or baseline comfort level  6/13/2025 0827 by Jenni Cooper RN  Outcome: Progressing  6/13/2025 0110 by Sofía Dupont RN  Outcome: Progressing  6/13/2025 0109 by Sofía Dupont RN  Outcome: Progressing     Problem: ABCDS Injury Assessment  Goal: Absence of physical injury  6/13/2025 0827 by Jenni Cooper RN  Outcome: Progressing  6/13/2025 0110 by Sofía Dupont RN  Outcome: Progressing  6/13/2025 0109 by Sofía Dupont RN  Outcome: Progressing     Problem: Skin/Tissue Integrity  Goal: Skin integrity remains intact  Description: 1.  Monitor for areas of redness and/or skin breakdown2.  Assess vascular access sites hourly3.  Every 4-6 hours minimum:  Change oxygen saturation probe site4.  Every 4-6 hours:  If on nasal continuous positive airway pressure, respiratory therapy assess nares and determine need for appliance change or resting period  6/13/2025 0827 by Jenni Cooper RN  Outcome: Progressing  Flowsheets (Taken 6/13/2025 0827)  Skin Integrity Remains Intact: Monitor for areas of redness and/or skin breakdown  6/13/2025 0110 by Sofía Dupont RN  Outcome: Progressing     Problem: Infection - Adult  Goal: Absence of infection at discharge  6/13/2025 0827 by Jenni Cooper RN  Outcome: Progressing  6/13/2025 0110 by Sofía Dupont RN  Outcome: Progressing     Problem: Hematologic - Adult  Goal: Maintains hematologic stability  6/13/2025 0827 by Jenni Cooper RN  Outcome: Progressing  6/13/2025 0110 by Sofía Dupont RN  Outcome: Progressing

## 2025-06-13 NOTE — PLAN OF CARE
Problem: Discharge Planning  Goal: Discharge to home or other facility with appropriate resources  6/13/2025 0110 by Sofía Dupont RN  Outcome: Progressing  6/13/2025 0109 by Sofía Dupont RN  Outcome: Progressing     Problem: Pain  Goal: Verbalizes/displays adequate comfort level or baseline comfort level  6/13/2025 0110 by Sofía Dupont RN  Outcome: Progressing  6/13/2025 0109 by Sofía Dupont RN  Outcome: Progressing     Problem: ABCDS Injury Assessment  Goal: Absence of physical injury  6/13/2025 0110 by Sofía Dupont RN  Outcome: Progressing  6/13/2025 0109 by Sofía Dupont RN  Outcome: Progressing     Problem: Skin/Tissue Integrity  Goal: Skin integrity remains intact  Description: 1.  Monitor for areas of redness and/or skin breakdown2.  Assess vascular access sites hourly3.  Every 4-6 hours minimum:  Change oxygen saturation probe site4.  Every 4-6 hours:  If on nasal continuous positive airway pressure, respiratory therapy assess nares and determine need for appliance change or resting period  Outcome: Progressing     Problem: Infection - Adult  Goal: Absence of infection at discharge  Outcome: Progressing     Problem: Hematologic - Adult  Goal: Maintains hematologic stability  Outcome: Progressing

## 2025-06-13 NOTE — DISCHARGE INSTR - COC
Continuity of Care Form    Patient Name: Garima Taylor   :  1975  MRN:  [id number]    Admit date:  2025  Discharge date:  6/15/25    Code Status Order: Full Code   Advance Directives:     Admitting Physician:  Mikey Brooks MD  PCP: Rimma Nava MD    Discharging Nurse: Nadege Li RN  Discharging Hospital Unit/Room#: N9P-1094/4253-01  Discharging Unit Phone Number: [phone]    Emergency Contact:   Extended Emergency Contact Information  Primary Emergency Contact: Cody Vizcarra  Mobile Phone: [phone]  Relation: Child  Secondary Emergency Contact: David Ellis  Mobile Phone: [phone]  Relation: Domestic Partner    Past Surgical History:  Past Surgical History:   Procedure Laterality Date     SECTION      x3    COLONOSCOPY      DILATION AND CURETTAGE OF UTERUS      ENDOMETRIAL ABLATION      FOOT SURGERY Left 2025    LEFT: SUBTALAR JOINT ARTHRODESIS, POSTERIOR TIBIAL TENDON RECONSTRUCTION, FLEXOR TENDON TRANSFER, GASTROCNEMIUS RECESSION performed by Roland Gillespie MD at [facility] OR    FOOT SURGERY Left 2025    . performed by Roland Gillespie MD at [facility] OR    FOOT SURGERY Left 2025    LEFT REMOVAL OF HEEL SCREWS performed by Roland Gillespie MD at [facility] OR    GASTROCNEMIUS RECESSION Left 2025    . performed by Roland Gillespie MD at [facility] OR    NASAL FRACTURE SURGERY      UPPER GASTROINTESTINAL ENDOSCOPY N/A 2024    ESOPHAGOGASTRODUODENOSCOPY BIOPSY performed by Juani Hook MD at [facility] ENDOSCOPY       Immunization History:   Immunization History   Administered Date(s) Administered    COVID-19, MODERNA BLUE border, Primary or Immunocompromised, (age 12y+), IM, 100 mcg/0.5mL 2021    Hep A, HAVRIX, VAQTA, (age 19y+), IM, 1mL 2018, 10/12/2020    Influenza, FLUARIX, FLULAVAL, FLUZONE (age 6 mo+) and AFLURIA, (age 3 y+), Quadv PF, 0.5mL 10/08/2020    TDaP, ADACEL (age 10y-64y), BOOSTRIX (age 10y+), IM,

## 2025-06-13 NOTE — CARE COORDINATION
Discharge Planning:  LAMONT contacted Mikaela [phone] with [facility] to provide her with an update.  Per ID, pt may d/c on Meropenem if she is able to tolerate this.    Mikaela requested the RN CM or bedside RN to contact [facility] at [phone] option [facility] to provide verbal orders once they are completed.   LAMONT contacted Rimma with [facility] to inform her of the possible d/c on 6/14/25.  PLAN: Home with resumption of IV abx through [facility] and RN through [facility].  RN will need to call [facility] with verbal orders at [phone] option 9.  DARRELL Talbot [facility]  [phone]  Electronically signed by DARRELL Hirsch on 6/13/2025 at 1:50 PM

## 2025-06-14 PROCEDURE — 6370000000 HC RX 637 (ALT 250 FOR IP): Performed by: FAMILY MEDICINE

## 2025-06-14 PROCEDURE — 94760 N-INVAS EAR/PLS OXIMETRY 1: CPT

## 2025-06-14 PROCEDURE — 6360000002 HC RX W HCPCS: Performed by: FAMILY MEDICINE

## 2025-06-14 PROCEDURE — 6370000000 HC RX 637 (ALT 250 FOR IP): Performed by: INTERNAL MEDICINE

## 2025-06-14 PROCEDURE — 2580000003 HC RX 258: Performed by: FAMILY MEDICINE

## 2025-06-14 PROCEDURE — 1200000000 HC SEMI PRIVATE

## 2025-06-14 PROCEDURE — 99232 SBSQ HOSP IP/OBS MODERATE 35: CPT | Performed by: INTERNAL MEDICINE

## 2025-06-14 RX ORDER — PREDNISONE 10 MG/1
5 TABLET ORAL 2 TIMES DAILY
Qty: 3 TABLET | Refills: 0 | Status: CANCELLED | OUTPATIENT
Start: 2025-06-14 | End: 2025-06-17

## 2025-06-14 RX ORDER — PREDNISONE 5 MG/1
5 TABLET ORAL DAILY
Qty: 3 TABLET | Refills: 0 | Status: SHIPPED | OUTPATIENT
Start: 2025-06-15 | End: 2025-06-18

## 2025-06-14 RX ORDER — FUROSEMIDE 40 MG/1
40 TABLET ORAL DAILY
Qty: 30 TABLET | Refills: 0
Start: 2025-06-14

## 2025-06-14 RX ORDER — OXYCODONE AND ACETAMINOPHEN 5; 325 MG/1; MG/1
1 TABLET ORAL EVERY 8 HOURS PRN
Qty: 7 TABLET | Refills: 0 | Status: SHIPPED | OUTPATIENT
Start: 2025-06-14 | End: 2025-06-17

## 2025-06-14 RX ADMIN — FERROUS SULFATE TAB EC 324 MG (65 MG FE EQUIVALENT) 325 MG: 324 (65 FE) TABLET DELAYED RESPONSE at 08:17

## 2025-06-14 RX ADMIN — OXYCODONE AND ACETAMINOPHEN 1 TABLET: 5; 325 TABLET ORAL at 04:16

## 2025-06-14 RX ADMIN — OXYCODONE AND ACETAMINOPHEN 1 TABLET: 5; 325 TABLET ORAL at 14:35

## 2025-06-14 RX ADMIN — MEROPENEM 1000 MG: 1 INJECTION INTRAVENOUS at 12:10

## 2025-06-14 RX ADMIN — MEROPENEM 1000 MG: 1 INJECTION INTRAVENOUS at 04:11

## 2025-06-14 RX ADMIN — PANTOPRAZOLE SODIUM 20 MG: 20 TABLET, DELAYED RELEASE ORAL at 08:17

## 2025-06-14 RX ADMIN — OXYCODONE AND ACETAMINOPHEN 1 TABLET: 5; 325 TABLET ORAL at 21:31

## 2025-06-14 RX ADMIN — SPIRONOLACTONE 25 MG: 25 TABLET ORAL at 08:17

## 2025-06-14 RX ADMIN — ENOXAPARIN SODIUM 30 MG: 100 INJECTION SUBCUTANEOUS at 21:31

## 2025-06-14 RX ADMIN — ROSUVASTATIN CALCIUM 10 MG: 10 TABLET, FILM COATED ORAL at 08:17

## 2025-06-14 RX ADMIN — PREDNISONE 5 MG: 5 TABLET ORAL at 08:17

## 2025-06-14 RX ADMIN — FUROSEMIDE 40 MG: 40 TABLET ORAL at 08:17

## 2025-06-14 RX ADMIN — ALOGLIPTIN 25 MG: 25 TABLET, FILM COATED ORAL at 08:17

## 2025-06-14 RX ADMIN — METFORMIN HYDROCHLORIDE 1000 MG: 500 TABLET, EXTENDED RELEASE ORAL at 08:17

## 2025-06-14 RX ADMIN — MEROPENEM 1000 MG: 1 INJECTION INTRAVENOUS at 20:12

## 2025-06-14 RX ADMIN — ENOXAPARIN SODIUM 30 MG: 100 INJECTION SUBCUTANEOUS at 08:17

## 2025-06-14 RX ADMIN — METOPROLOL SUCCINATE 100 MG: 50 TABLET, EXTENDED RELEASE ORAL at 08:17

## 2025-06-14 RX ADMIN — METFORMIN HYDROCHLORIDE 1000 MG: 500 TABLET, EXTENDED RELEASE ORAL at 17:50

## 2025-06-14 ASSESSMENT — PAIN DESCRIPTION - ORIENTATION
ORIENTATION: LEFT
ORIENTATION: LEFT

## 2025-06-14 ASSESSMENT — PAIN DESCRIPTION - LOCATION
LOCATION: FOOT
LOCATION: FOOT

## 2025-06-14 ASSESSMENT — PAIN DESCRIPTION - ONSET: ONSET: ON-GOING

## 2025-06-14 ASSESSMENT — PAIN SCALES - GENERAL
PAINLEVEL_OUTOF10: 7
PAINLEVEL_OUTOF10: 2
PAINLEVEL_OUTOF10: 6
PAINLEVEL_OUTOF10: 6

## 2025-06-14 ASSESSMENT — PAIN - FUNCTIONAL ASSESSMENT: PAIN_FUNCTIONAL_ASSESSMENT: PREVENTS OR INTERFERES WITH MANY ACTIVE NOT PASSIVE ACTIVITIES

## 2025-06-14 ASSESSMENT — PAIN DESCRIPTION - FREQUENCY: FREQUENCY: INTERMITTENT

## 2025-06-14 ASSESSMENT — PAIN DESCRIPTION - PAIN TYPE: TYPE: CHRONIC PAIN

## 2025-06-14 ASSESSMENT — PAIN DESCRIPTION - DESCRIPTORS
DESCRIPTORS: ACHING;DULL;SHARP
DESCRIPTORS: ACHING

## 2025-06-14 NOTE — CARE COORDINATION
[facility]:    has rec'd what they need from bedside RN and will deliver the med TODAY between 5 pm - 8 pm.    Spoke with [facility] :  They will see her tomorrow.    Spoke with bedside RN to see if she can stay for her next dose and then go home after that.    Spoke with patient to inform of delivery of Meds to her home between 5 - pm today.   She confirmed she has someone to receive them.    Asked her if she is willing to stay for her next dose of IV which is around 8pm.   She is Willing.  Informed her [facility] will visit her tomorrow but not for her 4 am dose as NO Home care does that.    Perfect served to Dr Brooks as pt is requesting a pain med and prednisone be ordered from [facility] on [address].    EDWARD Santiago     Case Management   [phone]    6/14/2025  1:35 PM    Updated bedside RNMerced.    EDWARD Santiago     Case Management   [phone]    6/14/2025  1:40 PM

## 2025-06-14 NOTE — PLAN OF CARE
Problem: Discharge Planning  Goal: Discharge to home or other facility with appropriate resources  Outcome: Progressing  Flowsheets (Taken 6/13/2025 2224)  Discharge to home or other facility with appropriate resources:   Identify barriers to discharge with patient and caregiver   Arrange for needed discharge resources and transportation as appropriate     Problem: Pain  Goal: Verbalizes/displays adequate comfort level or baseline comfort level  Outcome: Progressing  Flowsheets (Taken 6/13/2025 2055)  Verbalizes/displays adequate comfort level or baseline comfort level: Encourage patient to monitor pain and request assistance     Problem: ABCDS Injury Assessment  Goal: Absence of physical injury  Outcome: Progressing     Problem: Skin/Tissue Integrity  Goal: Skin integrity remains intact  Description: 1.  Monitor for areas of redness and/or skin breakdown2.  Assess vascular access sites hourly3.  Every 4-6 hours minimum:  Change oxygen saturation probe site4.  Every 4-6 hours:  If on nasal continuous positive airway pressure, respiratory therapy assess nares and determine need for appliance change or resting period  Outcome: Progressing  Flowsheets (Taken 6/13/2025 2224)  Skin Integrity Remains Intact: Monitor for areas of redness and/or skin breakdown     Problem: Infection - Adult  Goal: Absence of infection at discharge  Outcome: Progressing  Flowsheets (Taken 6/13/2025 2224)  Absence of infection at discharge: Monitor lab/diagnostic results     Problem: Hematologic - Adult  Goal: Maintains hematologic stability  Outcome: Progressing  Flowsheets (Taken 6/13/2025 2224)  Maintains hematologic stability: Assess for signs and symptoms of bleeding or hemorrhage     Problem: Safety - Adult  Goal: Free from fall injury  Outcome: Progressing     Problem: Neurosensory - Adult  Goal: Achieves stable or improved neurological status  Outcome: Progressing  Flowsheets (Taken 6/13/2025 2224)  Achieves stable or improved

## 2025-06-14 NOTE — CARE COORDINATION
Spoke with Dr Brooks about possible DC for today as long as IV therapies and home care can be arranged.    In reviewing the chart, She is Active with [facility] for home infusion prior to admit.    Call placed to [facility] to ask for returned call to confirm they are able to staff this weekend.    Perfect serve to Dr Brooks to inquire medication and frequency of abx.    EDWARD Santiago     Case Management   [phone]    6/14/2025  11:13 AM

## 2025-06-15 VITALS
WEIGHT: 286.6 LBS | SYSTOLIC BLOOD PRESSURE: 149 MMHG | BODY MASS INDEX: 47.75 KG/M2 | RESPIRATION RATE: 16 BRPM | TEMPERATURE: 98.8 F | DIASTOLIC BLOOD PRESSURE: 86 MMHG | HEART RATE: 72 BPM | OXYGEN SATURATION: 95 % | HEIGHT: 65 IN

## 2025-06-15 LAB
ANISOCYTOSIS BLD QL SMEAR: ABNORMAL
BASOPHILS # BLD: 0 K/UL (ref 0–0.2)
BASOPHILS NFR BLD: 0 %
DEPRECATED RDW RBC AUTO: 16.3 % (ref 12.4–15.4)
EOSINOPHIL # BLD: 0.1 K/UL (ref 0–0.6)
EOSINOPHIL NFR BLD: 1 %
HCT VFR BLD AUTO: 35.9 % (ref 36–48)
HGB BLD-MCNC: 12.2 G/DL (ref 12–16)
LYMPHOCYTES # BLD: 6.7 K/UL (ref 1–5.1)
LYMPHOCYTES NFR BLD: 53 %
MCH RBC QN AUTO: 27.2 PG (ref 26–34)
MCHC RBC AUTO-ENTMCNC: 34.1 G/DL (ref 31–36)
MCV RBC AUTO: 79.8 FL (ref 80–100)
MONOCYTES # BLD: 0.3 K/UL (ref 0–1.3)
MONOCYTES NFR BLD: 3 %
NEUTROPHILS # BLD: 4.4 K/UL (ref 1.7–7.7)
NEUTROPHILS NFR BLD: 38 %
OVALOCYTES BLD QL SMEAR: ABNORMAL
PATH INTERP BLD-IMP: YES
PLATELET # BLD AUTO: 205 K/UL (ref 135–450)
PMV BLD AUTO: 7.7 FL (ref 5–10.5)
RBC # BLD AUTO: 4.5 M/UL (ref 4–5.2)
SMUDGE CELLS BLD QL SMEAR: PRESENT
VARIANT LYMPHS NFR BLD MANUAL: 5 % (ref 0–6)
WBC # BLD AUTO: 11.5 K/UL (ref 4–11)

## 2025-06-15 PROCEDURE — 2580000003 HC RX 258: Performed by: FAMILY MEDICINE

## 2025-06-15 PROCEDURE — 6370000000 HC RX 637 (ALT 250 FOR IP)

## 2025-06-15 PROCEDURE — 85025 COMPLETE CBC W/AUTO DIFF WBC: CPT

## 2025-06-15 PROCEDURE — 6370000000 HC RX 637 (ALT 250 FOR IP): Performed by: FAMILY MEDICINE

## 2025-06-15 PROCEDURE — 94760 N-INVAS EAR/PLS OXIMETRY 1: CPT

## 2025-06-15 PROCEDURE — 6360000002 HC RX W HCPCS: Performed by: FAMILY MEDICINE

## 2025-06-15 PROCEDURE — 6370000000 HC RX 637 (ALT 250 FOR IP): Performed by: INTERNAL MEDICINE

## 2025-06-15 RX ADMIN — ROSUVASTATIN CALCIUM 10 MG: 10 TABLET, FILM COATED ORAL at 08:11

## 2025-06-15 RX ADMIN — PANTOPRAZOLE SODIUM 20 MG: 20 TABLET, DELAYED RELEASE ORAL at 08:11

## 2025-06-15 RX ADMIN — ENOXAPARIN SODIUM 30 MG: 100 INJECTION SUBCUTANEOUS at 08:14

## 2025-06-15 RX ADMIN — PREDNISONE 5 MG: 5 TABLET ORAL at 08:11

## 2025-06-15 RX ADMIN — OXYCODONE AND ACETAMINOPHEN 1 TABLET: 5; 325 TABLET ORAL at 09:13

## 2025-06-15 RX ADMIN — SALINE NASAL SPRAY 1 SPRAY: 1.5 SOLUTION NASAL at 04:57

## 2025-06-15 RX ADMIN — MEROPENEM 1000 MG: 1 INJECTION INTRAVENOUS at 04:54

## 2025-06-15 RX ADMIN — METOPROLOL SUCCINATE 100 MG: 50 TABLET, EXTENDED RELEASE ORAL at 08:10

## 2025-06-15 RX ADMIN — OXYCODONE AND ACETAMINOPHEN 1 TABLET: 5; 325 TABLET ORAL at 04:59

## 2025-06-15 RX ADMIN — ALOGLIPTIN 25 MG: 25 TABLET, FILM COATED ORAL at 08:10

## 2025-06-15 RX ADMIN — FUROSEMIDE 40 MG: 40 TABLET ORAL at 08:11

## 2025-06-15 RX ADMIN — FERROUS SULFATE TAB EC 324 MG (65 MG FE EQUIVALENT) 325 MG: 324 (65 FE) TABLET DELAYED RESPONSE at 08:10

## 2025-06-15 RX ADMIN — SPIRONOLACTONE 25 MG: 25 TABLET ORAL at 08:10

## 2025-06-15 RX ADMIN — METFORMIN HYDROCHLORIDE 1000 MG: 500 TABLET, EXTENDED RELEASE ORAL at 08:11

## 2025-06-15 ASSESSMENT — PAIN DESCRIPTION - LOCATION
LOCATION: FOOT
LOCATION: FOOT

## 2025-06-15 ASSESSMENT — PAIN DESCRIPTION - DESCRIPTORS
DESCRIPTORS: DULL
DESCRIPTORS: ACHING

## 2025-06-15 ASSESSMENT — PAIN DESCRIPTION - ORIENTATION
ORIENTATION: LEFT
ORIENTATION: LEFT

## 2025-06-15 ASSESSMENT — PAIN - FUNCTIONAL ASSESSMENT: PAIN_FUNCTIONAL_ASSESSMENT: ACTIVITIES ARE NOT PREVENTED

## 2025-06-15 ASSESSMENT — PAIN SCALES - GENERAL
PAINLEVEL_OUTOF10: 5
PAINLEVEL_OUTOF10: 5

## 2025-06-15 NOTE — CARE COORDINATION
DC order noted.  Spoke with bedside RN who states she is waiting on Sign other to pick her up.  [facility] to see her today to initiate home IV.    EDWARD Santiago     Case Management   [phone]    6/15/2025  10:24 AM

## 2025-06-15 NOTE — PROGRESS NOTES
Infectious Diseases Inpatient Progress Note      CHIEF COMPLAINT:     Allergic reaction to IV cefepime  Receiving IV antibiotics  PICC line in place  Left calcaneus osteomyelitis  Pseudomonas infection  Multiple antibiotic allergies  Hardware infection    HISTORY OF PRESENT ILLNESS:  49 y.o. female with significant history for asthma, hypertension, sleep apnea morbid obesity BMI at 48 left subtalar joint arthrodesis recent admission to [facility] for left foot infection concern for osteomyelitis patient was evaluated by  and patient underwent a left foot incision and drainage debridement for suspected osteomyelitis of the calcaneus with removal of hardware intraoperatively there was gross purulence cultures positive for Pseudomonas from 5/29/25, she was discharged home with a PICC line in place and IV cefepime.  Patient does report multiple antibiotic allergies.  Patient now present to the hospital with allergic reaction including lip swelling hives itching IV cefepime was promptly discontinued. She had symptoms started on Tuesday with hives that progressed with itching, swelling of the face and lips and  hence presented to hospital and PICC working well.  we are consulted for recommendation for ongoing IV antibiotic use.      Interval history: Skin rash RESOLVED and c/p Left ankle pain and PICC working home care being arranged and star done       Past Medical History:    Past Medical History:   Diagnosis Date    Anemia     Anemia     Asthma    Hypertension    PCOS (polycystic ovarian syndrome)    Sleep apnea     uses cpap    Anesthesia 2025    CHARTED IN H&P NOTE \"Difficulty with intubation is anticipated: only due to BMI and neck thickness. But she says there was no problem with prior surgeries. AND \"does have some issues with anesthesia and she will discuss that with the anesthesiologist. does have some issues with anesthesia and she will discuss that with the anesthesiologist.\"    
    V2.0    [facility] Progress Note      Name:  Garima Taylor /Age/Sex: 1975  (49 y.o. female)   MRN & CSN:  [id number] & [id number] Encounter Date/Time: 2025 12:32 PM EDT   Location:  [id number] PCP: Rimma Nava MD     Attending:Mikey Brooks MD       Hospital Day: 3    Assessment and Recommendations   Garima Taylor is a 49 y.o. female with pmh of asthma, stage who presents with Osteolysis of left foot      Patient was examined this morning.  Nursing staff are at the bedside giving overnight events    Patient has no complaints.  Infectious disease on board recommending meropenem 1 g every 8 hours until end date of 7/10/2025.  Case management on board and following for discharge needs and home health care company to provide medications on discharge    Possible discharge later today versus tomorrow depending on IV antibiotics needed at home.    Plan:  Osteomyelitis of left heel  Has been on Cefepime since 2025  S/P hardware removal of left calcaneal screws 2025   Having allergic reaction symptoms   Will start meropenum  Will consult ID for their recommendations        Allergic reaction  Swollen lips, hives, itchy x 2- 3 days  Has been on cefepime since 25  Also recently started on Fish oil   S/P Solu-Mdrol, Benadryl, Pepcid in the ED     3.Morbid Obesity  Presenting with BMI of 48.2  Most likely in the setting of increase caloric intake     Will continue to follow, monitor and manage chronic medical conditions with medications listed below     Diet ADULT DIET; Regular   DVT Prophylaxis [x] Lovenox, []  Heparin, [] SCDs, [x] Ambulation,  [] Eliquis, [] Xarelto  [] Coumadin   Code Status Full Code   Disposition From: home  Expected Disposition: home  Estimated Date of Discharge: 1-2 days  Patient requires continued admission due to clinical improved   Surrogate Decision Maker/ POA Self     Personally reviewed Lab Studies and Imaging         Medical Decision Making:  The 
4 Eyes Skin Assessment     NAME:  Garima Taylor  YOB: 1975  MEDICAL RECORD NUMBER:  [id number]    The patient is being assessed for  Admission    I agree that at least one RN has performed a thorough Head to Toe Skin Assessment on the patient. ALL assessment sites listed below have been assessed.      Areas assessed by both nurses:    Head, Face, Ears, Shoulders, Back, Chest, Arms, Elbows, Hands, Sacrum. Buttock, Coccyx, Ischium, Legs. Feet and Heels, and Under Medical Devices         Does the Patient have a Wound? No noted wound(s)       Bean Prevention initiated by RN: No  Wound Care Orders initiated by RN: No    Pressure Injury (Stage 3,4, Unstageable, DTI, NWPT, and Complex wounds) if present, place Wound referral order by RN under : No    New Ostomies, if present place, Ostomy referral order under : No     Nurse 1 eSignature: Electronically signed by Arminda Caceres RN on 6/12/25 at 10:37 AM EDT    **SHARE this note so that the co-signing nurse can place an eSignature**    Nurse 2 eSignature: Electronically signed by Kelli Driver RN on 6/12/25 at 1:26 PM EDT   
Home with son and boyfriend. All belongings gathered and verified. Picc line in place  
Medication Reconciliation    List of medications patient is currently taking is complete.     Source of information: 1. Conversation with patient at bedside                                      2. EPIC records      Fidel Mosquera RPH, PharmD, BCPS  6/13/2025 9:58 AM              
Medication Reconciliation    List of medications patient is currently taking is complete.     Source of information: 1. Conversation with patient at bedside                                      2. EPIC records      Fidel Mosquera RPH, PharmD, BCPS  6/13/2025 9:58 AM              
Patient alert and oriented X4. Vital signs recorded. Patient states 4 out of 10 pain medicine. Followed PRN pain management protocol. See MAR. Patient verbalized understanding of education. All needed items within reach. Electronically signed by Vita Ramos RN on 6/13/2025 at 8:59 PM   
Patient does not have home unit and does not want hospital CPAP/bipap machine. Patient will have family bring home unit   
Patient refused for PICC line dressing change. Patient requested to be done in the morning. Patient provided education the importance of PICC  of CN informed.   Electronically signed by Sofía Dupont RN on 6/13/2025 at 3:30 AM   
Pt Ao x4. VSS. Pt has 5/10 pain. Morning medications given whole, tolerated well. Morning assessment completed. Pt has no questions or concerns. Pt turns self in the bed, and is UAL with the use of a knee scooter. Bed in lowest and locked position with call light in reach.    Electronically signed by Jenni Cooper RN on 6/13/2025 at 8:29 AM    
Pt admitted to Rm 4253 from ED, pt alert and oriented providing history, vitals stable on room air, pt using CPAP at night, did not bring her machine from home, will need one from respiratory. PICC in R Cephalic, dressing clean intact and dry, line flushed and capped.     L heel dressing applied per pt providing instructions received from ortho surgery. Ace wrap applied, extremity elevated on Pillow, Heel floating.     Pt complaining of pain in L foot, meds administered per MAR, pt oriented to room and call light, encouraged to call for any needs.     Pt arrived with her own knee scooter from home, Bed alarm deferred at this time, pt independent with knee scooter in the room for bathroom needs. Breakfast ordered, pt resting in bed at this time, no further needs at this time.     Plan of care in progress.     Electronically signed by Arminda Caceres RN on 6/12/25 at 10:36 AM EDT       
Daily    spironolactone  25 mg Oral Daily    metoprolol succinate  100 mg Oral Daily      Infusions:    sodium chloride       PRN Meds: tiZANidine, 2 mg, Q8H PRN  sodium chloride flush, 5-40 mL, PRN  sodium chloride, , PRN  potassium chloride, 40 mEq, PRN   Or  potassium alternative oral replacement, 40 mEq, PRN   Or  potassium chloride, 10 mEq, PRN  magnesium sulfate, 2,000 mg, PRN  ondansetron, 4 mg, Q8H PRN   Or  ondansetron, 4 mg, Q6H PRN  polyethylene glycol, 17 g, Daily PRN  acetaminophen, 650 mg, Q6H PRN   Or  acetaminophen, 650 mg, Q6H PRN  HYDROmorphone, 0.5 mg, Q4H PRN  oxyCODONE-acetaminophen, 1 tablet, Q4H PRN  hydrOXYzine pamoate, 25 mg, TID PRN        Labs and Imaging   No results found.    CBC:   Recent Labs     06/12/25  0550   WBC 15.4*   HGB 12.2        BMP:    Recent Labs     06/12/25  0550      K 3.9      CO2 26   BUN 14   CREATININE 0.8   GLUCOSE 145*     Hepatic: No results for input(s): \"AST\", \"ALT\", \"BILITOT\", \"ALKPHOS\" in the last 72 hours.    Invalid input(s): \"ALB\"  Lipids:   Lab Results   Component Value Date/Time    CHOL 142 12/20/2024 12:00 AM    HDL 34 12/20/2024 12:00 AM    TRIG 141 12/20/2024 12:00 AM     Hemoglobin A1C:   Lab Results   Component Value Date/Time    LABA1C 6.0 02/25/2025 11:33 AM     TSH:   Lab Results   Component Value Date/Time    TSH 1.89 01/18/2024 08:29 AM     Troponin: No results found for: \"TROPONINT\"  Lactic Acid: No results for input(s): \"LACTA\" in the last 72 hours.  BNP: No results for input(s): \"PROBNP\" in the last 72 hours.  UA:  Lab Results   Component Value Date/Time    NITRU Negative 10/08/2024 05:21 PM    COLORU dark yellow 10/08/2024 05:23 PM    COLORU Yellow 10/08/2024 05:21 PM    PHUR 6.0 10/08/2024 05:23 PM    PHUR 5.5 10/08/2024 05:21 PM    WBCUA 15 10/08/2024 05:21 PM    RBCUA 1 10/08/2024 05:21 PM    BACTERIA None Seen 10/08/2024 05:21 PM    CLARITYU clear 10/08/2024 05:23 PM    CLARITYU CLOUDY 10/08/2024 05:21 PM    SPECGRAV 
11:38 AM EST     All pertinent images and reports for the current Hospitalization were reviewed by me.    IMPRESSION:    Patient Active Problem List   Diagnosis Code    Plantar fasciitis of right foot M72.2    Tibialis posterior tendinitis, right M76.821    Right foot pain M79.671    Right ankle pain M25.571    ADHD (attention deficit hyperactivity disorder) F90.9    PCOS (polycystic ovarian syndrome) metformin and spironolactone E28.2    Gastroesophageal reflux disease without esophagitis  protonix K21.9    Bilateral lower extremity edema lasix R60.0    Exercise-induced asthma J45.990    Osteopenia determined by x-ray M85.80    Chronic osteomyelitis of left foot (formerly [facility]) M86.672    Blood pressure elevated without history of HTN R03.0    Osteolysis of left foot M89.572    Hardware complicating wound infection T84.7XXA    Pseudomonas infection A49.8    Acute osteomyelitis of left calcaneus (formerly [facility]) M86.172    Morbid obesity with BMI of 45.0-49.9, adult (formerly [facility]) E66.01, Z68.42    Allergic drug rash due to anti-infective agent L27.0, T37.95XA    Allergy to multiple antibiotics Z88.1    Hives L50.9    Receiving intravenous antibiotic treatment as outpatient Z79.2         Patient admitted to the hospital with allergic reaction to IV Cefepime  Receiving IV antibiotic as outpatient  PICC line in place  Morbid obesity BMI 48  Left ankle hardware infection and osteomyelitis  Status post incision and drainage and removal of hardware from the calcaneus on 5/29/25 by  at [facility]  Operative culture positive for Pseudomonas  History of asthma  Obstructive sleep apnea  Multiple antibiotic allergies  ESR 26  CRP less than 5    She has a significant allergic reaction and will need to cont steroids for another x 24 hrs and cont Benadryl PRN and her IV abx switched to IV Meropenem to cover Pseudomonas and cx reviewed    If the patient tolerates IV antibiotic well will be able to choose IV meropenem for discharge planning.  Hopefully

## 2025-06-16 ENCOUNTER — TELEPHONE (OUTPATIENT)
Dept: FAMILY MEDICINE CLINIC | Age: 50
End: 2025-06-16

## 2025-06-16 ENCOUNTER — TELEPHONE (OUTPATIENT)
Dept: INFECTIOUS DISEASES | Age: 50
End: 2025-06-16

## 2025-06-16 LAB
ALBUMIN: 4.8 G/DL
ALP BLD-CCNC: 70 U/L
ALT SERPL-CCNC: 52 U/L
ANION GAP SERPL CALCULATED.3IONS-SCNC: 10 MMOL/L
AST SERPL-CCNC: 31 U/L
BASOPHILS ABSOLUTE: 0.1 /ΜL
BASOPHILS RELATIVE PERCENT: 0 %
BILIRUB SERPL-MCNC: 0.4 MG/DL (ref 0.1–1.4)
BUN BLDV-MCNC: 20 MG/DL
C-REACTIVE PROTEIN: 5
CALCIUM SERPL-MCNC: 9.9 MG/DL
CHLORIDE BLD-SCNC: 96 MMOL/L
CO2: 31 MMOL/L
CREAT SERPL-MCNC: 0.71 MG/DL
EOSINOPHILS ABSOLUTE: 0.1 /ΜL
EOSINOPHILS RELATIVE PERCENT: 1 %
FUNGUS SPEC CULT: NORMAL
FUNGUS SPEC CULT: NORMAL
GFR, ESTIMATED: 104
GLUCOSE BLD-MCNC: 94 MG/DL
HCT VFR BLD CALC: 39.5 % (ref 36–46)
HEMOGLOBIN: 13 G/DL (ref 12–16)
LOEFFLER MB STN SPEC: NORMAL
LOEFFLER MB STN SPEC: NORMAL
LYMPHOCYTES ABSOLUTE: 3.4 /ΜL
LYMPHOCYTES RELATIVE PERCENT: 26 %
MCH RBC QN AUTO: 26.5 PG
MCHC RBC AUTO-ENTMCNC: 33 G/DL
MCV RBC AUTO: 80.3 FL
MONOCYTES ABSOLUTE: 0.7 /ΜL
MONOCYTES RELATIVE PERCENT: 5 %
NEUTROPHILS ABSOLUTE: 8.8 /ΜL
NEUTROPHILS RELATIVE PERCENT: 68 %
PATH INTERP BLD-IMP: NORMAL
PDW BLD-RTO: 16 %
PLATELET # BLD: 263 K/ΜL
PMV BLD AUTO: 7.5 FL
POTASSIUM SERPL-SCNC: 4.2 MMOL/L
RBC # BLD: 4.92 10^6/ΜL
SED RATE, AUTOMATED: 24
SODIUM BLD-SCNC: 137 MMOL/L
TOTAL PROTEIN: 7.1 G/DL (ref 6.4–8.2)
WBC # BLD: 13 10^3/ML

## 2025-06-16 NOTE — TELEPHONE ENCOUNTER
OPAT Nurse Coordinator Weekly Update Note    Current OPAT plan:  IV meropenem 1 g every 8 hours  - Planned End date: 7/10/2025    Diagnosis:  : Left foot hardware     Assessment:  Spoke with patient who reports she is doing better since being home from the hospital from her allergic reaction.  She confirmed she is doing well with the meropenem.    She denies fevers.  She reports she saw Dr. Gillespie last week and states the hole is starting to close up on her foot.  She denies drainage.     Follow up appts:  Dr. Gillespie 7/16

## 2025-06-16 NOTE — DISCHARGE SUMMARY
Hospital Medicine Discharge Summary    Patient ID: Garima Taylor      Patient's PCP: Rimma Nava MD    Admit Date: 6/12/2025     Discharge Date: 6/15/2025      Admitting Provider: Mikey Brooks MD     Discharge Provider: Mikey Brooks MD     Discharge Diagnoses:       Active Hospital Problems    Diagnosis     Osteolysis of left foot [M89.572]     Hardware complicating wound infection [T84.7XXA]     Pseudomonas infection [A49.8]     Acute osteomyelitis of left calcaneus ([facility]) [M86.172]     Morbid obesity with BMI of 45.0-49.9, adult ([facility]) [E66.01, Z68.42]     Allergic drug rash due to anti-infective agent [L27.0, T37.95XA]     Allergy to multiple antibiotics [Z88.1]     Hives [L50.9]     Receiving intravenous antibiotic treatment as outpatient [Z79.2]        The patient was seen and examined on day of discharge and this discharge summary is in conjunction with any daily progress note from day of discharge.    Hospital Course:   Garima Taylor is a 49 y.o. female with pmh of asthma, stage who presents with Osteolysis of left heel  Patient has been on IV antibiotics.  Recently started taking OTC supplements.  Started having allergic reaction with swollen lip, rash, swelling and welts.  Presented to the ED unsure if the allergic reaction is from the IV antibiotics versus the OTC supplements.  Supplements have been held on admission and will discontinue on discharge  Infectious disease consulted, IV antibiotic changed to meropenem.  Patient will be discharged on meropenem.  Patient is follow-up with infectious disease outpatient  Patient is follow-up with orthopedic surgery outpatient as prior  Patient is to follow-up PCP in 3 to 5 days or sooner if needed  Patient is to return to the ED if symptoms return        Plan:  Osteomyelitis of left heel  Has been on Cefepime since 05/29/2025  S/P hardware removal of left calcaneal screws 05/29/2025   Having allergic reaction symptoms   Will start

## 2025-06-16 NOTE — TELEPHONE ENCOUNTER
Care Transitions Initial Follow Up Call    Outreach made within 2 business days of discharge: Yes    Patient: Garima Taylor Patient : 1975   MRN: [id number]  Reason for Admission: osteolysis, urticaria  Discharge Date: 6/15/25       Spoke with: jesus    Discharge department/facility: [facility]        Scheduled appointment with PCP within 7-14 days  APPT SCHEDULED  Follow Up  Future Appointments   Date Time Provider Department Center   2025 10:40 AM Rimma Nava MD [facility] DEP       Migdalia Naidu MA

## 2025-06-17 DIAGNOSIS — M86.172 ACUTE OSTEOMYELITIS OF LEFT FOOT (HCC): ICD-10-CM

## 2025-06-17 LAB
ACID FAST STN SPEC QL: NORMAL
MYCOBACTERIUM SPEC CULT: NORMAL

## 2025-06-18 DIAGNOSIS — Z91.09 ENVIRONMENTAL ALLERGIES: ICD-10-CM

## 2025-06-18 RX ORDER — FLUTICASONE PROPIONATE 50 MCG
SPRAY, SUSPENSION (ML) NASAL
Qty: 16 ML | Refills: 5 | Status: SHIPPED | OUTPATIENT
Start: 2025-06-18

## 2025-06-20 ENCOUNTER — TELEPHONE (OUTPATIENT)
Dept: FAMILY MEDICINE CLINIC | Age: 50
End: 2025-06-20

## 2025-06-20 NOTE — TELEPHONE ENCOUNTER
Called the pt offered a HFU she was discharged on 6/15/25 pt stated she is not sure what Dr ALVARADO can do for her ? Pt is still recovering from 2 surgery's   Pt said her leg is still tingly (left) not swollen but it is warm  pt feels weak right now   Pt has a home health nurse is coming at 1230 today pt does not want to leave the house her pic line is loose so hoping the nurse can fix it   Pt stated the nurse is coming to do blood work Monday FYI   I advised the pt Dr ALVARADO will want to see her       Please advise

## 2025-06-20 NOTE — TELEPHONE ENCOUNTER
Pt cancelled her appt earlier today.  I am ok doing a virtual hospital follow up next week so that I know what is going on  If there are concerns that infection is getting worse she needs to contact ID about that.  I don't know what she ment about feeling weak.  Pls clarify

## 2025-06-23 LAB
ALBUMIN: 4.5 G/DL
ALP BLD-CCNC: 62 U/L
ALT SERPL-CCNC: 36 U/L
ANION GAP SERPL CALCULATED.3IONS-SCNC: 8 MMOL/L
AST SERPL-CCNC: 21 U/L
BASOPHILS ABSOLUTE: 0.1 /ΜL
BASOPHILS RELATIVE PERCENT: 1 %
BILIRUB SERPL-MCNC: 0.4 MG/DL (ref 0.1–1.4)
BUN BLDV-MCNC: 21 MG/DL
C-REACTIVE PROTEIN: 5
CALCIUM SERPL-MCNC: 9.9 MG/DL
CHLORIDE BLD-SCNC: 98 MMOL/L
CO2: 30 MMOL/L
CREAT SERPL-MCNC: 0.73 MG/DL
EOSINOPHILS ABSOLUTE: 0.1 /ΜL
EOSINOPHILS RELATIVE PERCENT: 1 %
FUNGUS SPEC CULT: NORMAL
FUNGUS SPEC CULT: NORMAL
GFR, ESTIMATED: 100
GLUCOSE BLD-MCNC: 105 MG/DL
HCT VFR BLD CALC: 39.5 % (ref 36–46)
HEMOGLOBIN: 13.2 G/DL (ref 12–16)
LOEFFLER MB STN SPEC: NORMAL
LOEFFLER MB STN SPEC: NORMAL
LYMPHOCYTES ABSOLUTE: 3.8 /ΜL
LYMPHOCYTES RELATIVE PERCENT: 35 %
MCH RBC QN AUTO: 27 PG
MCHC RBC AUTO-ENTMCNC: 33.3 G/DL
MCV RBC AUTO: 81 FL
MONOCYTES ABSOLUTE: 0.7 /ΜL
MONOCYTES RELATIVE PERCENT: 6 %
NEUTROPHILS ABSOLUTE: 6.1 /ΜL
NEUTROPHILS RELATIVE PERCENT: 57 %
PDW BLD-RTO: 16.1 %
PLATELET # BLD: 199 K/ΜL
PMV BLD AUTO: 8 FL
POTASSIUM SERPL-SCNC: 3.9 MMOL/L
RBC # BLD: 4.87 10^6/ΜL
SED RATE, AUTOMATED: 21
SODIUM BLD-SCNC: 136 MMOL/L
TOTAL PROTEIN: 7.1 G/DL (ref 6.4–8.2)
WBC # BLD: 10.8 10^3/ML

## 2025-06-23 NOTE — TELEPHONE ENCOUNTER
I spoke to the pt she has been following up with ortho and ID. Pt feels tired from all her surgeries and everything going on recently. She is starting to get better. She's OK with scheduling a VV HFU if needed.

## 2025-06-24 LAB
ACID FAST STN SPEC QL: NORMAL
MYCOBACTERIUM SPEC CULT: NORMAL

## 2025-06-25 DIAGNOSIS — M86.172 ACUTE OSTEOMYELITIS OF LEFT FOOT (HCC): ICD-10-CM

## 2025-06-30 ENCOUNTER — TELEPHONE (OUTPATIENT)
Dept: INFECTIOUS DISEASES | Age: 50
End: 2025-06-30

## 2025-06-30 LAB
FUNGUS SPEC CULT: NORMAL
FUNGUS SPEC CULT: NORMAL
LOEFFLER MB STN SPEC: NORMAL
LOEFFLER MB STN SPEC: NORMAL

## 2025-06-30 RX ORDER — PANTOPRAZOLE SODIUM 20 MG/1
20 TABLET, DELAYED RELEASE ORAL DAILY
Qty: 90 TABLET | Refills: 1 | Status: SHIPPED | OUTPATIENT
Start: 2025-06-30

## 2025-06-30 NOTE — TELEPHONE ENCOUNTER
OPAT Nurse Coordinator Weekly Update Note    Current OPAT plan:  IV meropenem 1 g every 8 hours  - Planned End date: 7/10/2025    Diagnosis:  Left foot hardware     Assessment:  Received call from [facility] RN.  Patient's PICC line is sluggish and no blood return.  IV abx infusing slow.     Spoke with patient.  Patient scheduled for cathflo.  [facility] OPIC 7/3 (per pt request) at 1100AM  Labs requested to be drawn as well    Patient reports she is still very tired but doing ok.  Foot has scabbed over.  C/o of pain at night.  Denies fevers or drainage.      Follow up appts:  Dr. Gillespie 7/16

## 2025-07-01 LAB
ACID FAST STN SPEC QL: NORMAL
MYCOBACTERIUM SPEC CULT: NORMAL

## 2025-07-03 ENCOUNTER — HOSPITAL ENCOUNTER (OUTPATIENT)
Dept: INFUSION THERAPY | Age: 50
Setting detail: INFUSION SERIES
Discharge: HOME | End: 2025-07-03
Payer: COMMERCIAL

## 2025-07-03 VITALS
HEART RATE: 75 BPM | RESPIRATION RATE: 16 BRPM | DIASTOLIC BLOOD PRESSURE: 76 MMHG | SYSTOLIC BLOOD PRESSURE: 132 MMHG | TEMPERATURE: 97.7 F

## 2025-07-03 DIAGNOSIS — Z79.2 RECEIVING INTRAVENOUS ANTIBIOTIC TREATMENT AS OUTPATIENT: Primary | ICD-10-CM

## 2025-07-03 LAB
ALBUMIN SERPL-MCNC: 4.2 G/DL (ref 3.4–5)
ALBUMIN/GLOB SERPL: 1.7 {RATIO} (ref 1.1–2.2)
ALP SERPL-CCNC: 69 U/L (ref 40–129)
ALT SERPL-CCNC: 44 U/L (ref 10–40)
ANION GAP SERPL CALCULATED.3IONS-SCNC: 15 MMOL/L (ref 3–16)
AST SERPL-CCNC: 29 U/L (ref 15–37)
BASOPHILS # BLD: 0 K/UL (ref 0–0.2)
BASOPHILS NFR BLD: 0.7 %
BILIRUB SERPL-MCNC: 0.6 MG/DL (ref 0–1)
BUN SERPL-MCNC: 15 MG/DL (ref 7–20)
CALCIUM SERPL-MCNC: 9.4 MG/DL (ref 8.3–10.6)
CHLORIDE SERPL-SCNC: 101 MMOL/L (ref 99–110)
CO2 SERPL-SCNC: 23 MMOL/L (ref 21–32)
CREAT SERPL-MCNC: 0.7 MG/DL (ref 0.6–1.1)
CRP SERPL-MCNC: <3 MG/L (ref 0–5.1)
DEPRECATED RDW RBC AUTO: 15.8 % (ref 12.4–15.4)
EOSINOPHIL # BLD: 0.1 K/UL (ref 0–0.6)
EOSINOPHIL NFR BLD: 1.4 %
ERYTHROCYTE [SEDIMENTATION RATE] IN BLOOD BY WESTERGREN METHOD: 9 MM/HR (ref 0–20)
GFR SERPLBLD CREATININE-BSD FMLA CKD-EPI: >90 ML/MIN/{1.73_M2}
GLUCOSE SERPL-MCNC: 104 MG/DL (ref 70–99)
HCT VFR BLD AUTO: 36.6 % (ref 36–48)
HGB BLD-MCNC: 12.5 G/DL (ref 12–16)
LYMPHOCYTES # BLD: 2.1 K/UL (ref 1–5.1)
LYMPHOCYTES NFR BLD: 29.4 %
MCH RBC QN AUTO: 27.4 PG (ref 26–34)
MCHC RBC AUTO-ENTMCNC: 34.1 G/DL (ref 31–36)
MCV RBC AUTO: 80.3 FL (ref 80–100)
MONOCYTES # BLD: 0.5 K/UL (ref 0–1.3)
MONOCYTES NFR BLD: 6.8 %
NEUTROPHILS # BLD: 4.4 K/UL (ref 1.7–7.7)
NEUTROPHILS NFR BLD: 61.7 %
PLATELET # BLD AUTO: 158 K/UL (ref 135–450)
PMV BLD AUTO: 8.1 FL (ref 5–10.5)
POTASSIUM SERPL-SCNC: 3.8 MMOL/L (ref 3.5–5.1)
PROT SERPL-MCNC: 6.7 G/DL (ref 6.4–8.2)
RBC # BLD AUTO: 4.56 M/UL (ref 4–5.2)
SODIUM SERPL-SCNC: 139 MMOL/L (ref 136–145)
WBC # BLD AUTO: 7.1 K/UL (ref 4–11)

## 2025-07-03 PROCEDURE — 2500000003 HC RX 250 WO HCPCS: Performed by: INTERNAL MEDICINE

## 2025-07-03 PROCEDURE — 85025 COMPLETE CBC W/AUTO DIFF WBC: CPT

## 2025-07-03 PROCEDURE — 86140 C-REACTIVE PROTEIN: CPT

## 2025-07-03 PROCEDURE — 85652 RBC SED RATE AUTOMATED: CPT

## 2025-07-03 PROCEDURE — 80053 COMPREHEN METABOLIC PANEL: CPT

## 2025-07-03 PROCEDURE — 99212 OFFICE O/P EST SF 10 MIN: CPT

## 2025-07-03 RX ORDER — SODIUM CHLORIDE 9 MG/ML
INJECTION, SOLUTION INTRAVENOUS PRN
OUTPATIENT
Start: 2025-07-03

## 2025-07-03 RX ORDER — SODIUM CHLORIDE 0.9 % (FLUSH) 0.9 %
5-40 SYRINGE (ML) INJECTION PRN
OUTPATIENT
Start: 2025-07-03

## 2025-07-03 RX ORDER — ALBUTEROL SULFATE 90 UG/1
4 INHALANT RESPIRATORY (INHALATION) PRN
OUTPATIENT
Start: 2025-07-03

## 2025-07-03 RX ORDER — HEPARIN 100 UNIT/ML
500 SYRINGE INTRAVENOUS PRN
OUTPATIENT
Start: 2025-07-03

## 2025-07-03 RX ORDER — SODIUM CHLORIDE 0.9 % (FLUSH) 0.9 %
5-40 SYRINGE (ML) INJECTION PRN
Status: DISCONTINUED | OUTPATIENT
Start: 2025-07-03 | End: 2025-07-04 | Stop reason: HOSPADM

## 2025-07-03 RX ORDER — SODIUM CHLORIDE 9 MG/ML
25 INJECTION, SOLUTION INTRAVENOUS PRN
OUTPATIENT
Start: 2025-07-03

## 2025-07-03 RX ORDER — ACETAMINOPHEN 325 MG/1
650 TABLET ORAL
OUTPATIENT
Start: 2025-07-03

## 2025-07-03 RX ORDER — ONDANSETRON 2 MG/ML
8 INJECTION INTRAMUSCULAR; INTRAVENOUS
OUTPATIENT
Start: 2025-07-03

## 2025-07-03 RX ORDER — EPINEPHRINE 1 MG/ML
0.3 INJECTION, SOLUTION, CONCENTRATE INTRAVENOUS PRN
OUTPATIENT
Start: 2025-07-03

## 2025-07-03 RX ORDER — HYDROCORTISONE SODIUM SUCCINATE 100 MG/2ML
100 INJECTION INTRAMUSCULAR; INTRAVENOUS
OUTPATIENT
Start: 2025-07-03

## 2025-07-03 RX ORDER — DIPHENHYDRAMINE HYDROCHLORIDE 50 MG/ML
50 INJECTION, SOLUTION INTRAMUSCULAR; INTRAVENOUS
OUTPATIENT
Start: 2025-07-03

## 2025-07-03 RX ADMIN — SODIUM CHLORIDE, PRESERVATIVE FREE 40 ML: 5 INJECTION INTRAVENOUS at 10:56

## 2025-07-03 ASSESSMENT — PAIN DESCRIPTION - ONSET: ONSET: ON-GOING

## 2025-07-03 ASSESSMENT — PAIN DESCRIPTION - ORIENTATION: ORIENTATION: LEFT

## 2025-07-03 ASSESSMENT — PAIN DESCRIPTION - LOCATION: LOCATION: FOOT

## 2025-07-03 ASSESSMENT — PAIN DESCRIPTION - PAIN TYPE: TYPE: CHRONIC PAIN

## 2025-07-03 ASSESSMENT — PAIN - FUNCTIONAL ASSESSMENT: PAIN_FUNCTIONAL_ASSESSMENT: PREVENTS OR INTERFERES SOME ACTIVE ACTIVITIES AND ADLS

## 2025-07-03 ASSESSMENT — PAIN DESCRIPTION - FREQUENCY: FREQUENCY: INTERMITTENT

## 2025-07-03 ASSESSMENT — PAIN DESCRIPTION - DESCRIPTORS: DESCRIPTORS: DULL;SORE

## 2025-07-03 ASSESSMENT — PAIN SCALES - GENERAL: PAINLEVEL_OUTOF10: 2

## 2025-07-03 NOTE — DISCHARGE INSTRUCTIONS
Outpatient Infusion Discharge Instructions  [facility]  [address], [place] [address]  Telephone: [phone]      FAX [phone]    NAME:  Garima Taylor  YOB: 1975  MEDICAL RECORD NUMBER:  [id number]  DATE:  July 3, 2025    Reason for Outpatient Infusion Visit: PICC dressing change and labs    If you develop any these symptoms please contact you Doctor    [] Nausea and/or vomiting not relieved with medication   [x] Swelling, redness, and/or bleeding at injection or IV site    [x] Fever or chills  [x] Rash or itching   [] Shortness of breath  [x] Please review After Visit Summary (AVS) information on: PICC care, antibiotics    [x] Other: Follow up with Home Care agency and Dr Reddy      Outpatient Infusion Center Information: Should you experience any significant changes in your health or have questions about your care please contact the [facility] at [phone] MONDAY - FRIDAY 8:00 am - 4:00 pm.  If you need help outside these hours and cannot wait until we are again available, contact your Primary Care Physician or go to the hospital emergency room.       Electronically signed by Giovana Bhat RN on 7/3/2025 at 11:37 AM

## 2025-07-03 NOTE — PROGRESS NOTES
Outpatient Infusion Center   [facility]    PICC Lab Draw and dressing change    NAME:  Garima Taylor  YOB: 1975  MEDICAL RECORD NUMBER:  [id number]  DATE:  7/3/2025    Patient arrived to Outpatient Infusion Center   [] per wheelchair   [x] ambulatory with knee scooter    Patient alert and oriented x 4. Patient receiving IV antibiotics at home for osteomyelitis of left foot. Reports that Home Care was unable to draw lab work due to sluggish blood flow and is here to have Alteplase placed in PICC line and then have labs drawn.   No redness, pain, swelling or drainage noted at PICC site. Dressing loose around edges and small amount of old, dried blood noted on Biopatch. PICC line flushed easily and + brisk blood return obtained. Labs drawn and then needless injection caps,and extension tubing changed. PICC line saline locked and alcohol caps applied.     PICC Lab Draw  PICC Location: right arm/right cephalic    PICC Site:  Redness: No  Bruising: No   Edema: No  Pain: No     PICC Labs  Cap cleansed with Chloroprep Scrub for 30 seconds and air dried completely for 1 minute on sterile 4X4: Yes    Blood drawn using a 10 ml syringe    Amount of blood wasted 10 ml syringe      Labs Obtained: Yes  Lab Test(s) Ordered: CBC with diff, CMP, ESR and CRP    PICC Flushed with 30 ml NS    New needless injection caps and alcohol caps applied: Yes     PICC Dressing Change    PICC Location: right arm/right cephalic    PICC Site:  Redness: No  Bruising: No   Edema: No  Pain: No     PICC Cleansed:  Current dressing and stat lock removed: Yes    Chloroprep Scrub for 30 seconds and air dried completely for 1 minute: Yes     PICC Dressing Change  Skin barrier: Yes    Stat lock applied and PICC line secured: Yes    Biopatch applied: No - Tegaderm with CHG gel used     PICC site covered with Tegaderm: Yes - covered with Tegaderm with CHG gel    Date and initials applied to PICC dressing: Yes    Next Dressing  Hospital  Yes              Clinical Level of Care      Points  0-2  Level 1 []     Points  3-5  Level 2 [x]     Points  6-9  Level 3 []     Points  10-12  Level 4 []     Points  13-15  Level 5 []       Electronically signed by Giovana Bhat RN on 7/3/2025 at 7:29 PM

## 2025-07-06 DIAGNOSIS — F90.2 ATTENTION DEFICIT HYPERACTIVITY DISORDER (ADHD), COMBINED TYPE: ICD-10-CM

## 2025-07-07 ENCOUNTER — TELEPHONE (OUTPATIENT)
Dept: INFECTIOUS DISEASES | Age: 50
End: 2025-07-07

## 2025-07-07 NOTE — TELEPHONE ENCOUNTER
OPAT Nurse Coordinator Weekly Update Note    Current OPAT plan:  IV meropenem 1 g every 8 hours  - Planned End date: 7/10/2025    Diagnosis:  Left foot hardware     Assessment:  Patient reports she is doing well.  Hesitant to come off IV abx. Skin is healed over, denies fevers/drainage.      Follow up appts:  Dr. Gillespie 7/16

## 2025-07-07 NOTE — TELEPHONE ENCOUNTER
She is completing 6 weeks of IV abx for L heel HW infection. The screws were removed.     Patient states the skin is now healed over.  Her labs have remained stable.     No role for PO suppression here and no PO options anyway as she has severe rash with fluoroquinolones.     I understand the anxiety, she has been through a lot. She just needs to continue to follow ortho instruction.     We will end OPAT as planned.

## 2025-07-08 LAB
ACID FAST STN SPEC QL: NORMAL
ALBUMIN: 4.4 G/DL (ref 3.5–5.7)
ALP BLD-CCNC: 66 IU/L (ref 35–135)
ALT SERPL-CCNC: 29 IU/L (ref 10–60)
ANION GAP SERPL CALCULATED.3IONS-SCNC: 7 MMOL/L (ref 4–16)
AST SERPL-CCNC: 21 IU/L (ref 10–40)
BASOPHILS ABSOLUTE: 0.1 THOU/MCL (ref 0–0.2)
BASOPHILS ABSOLUTE: 1 %
BILIRUB SERPL-MCNC: 0.3 MG/DL (ref 0–1.2)
BUN BLDV-MCNC: 13 MG/DL (ref 8–26)
C-REACTIVE PROTEIN WIDE RANGE: <5 MG/L
CALCIUM SERPL-MCNC: 9.4 MG/DL (ref 8.5–10.4)
CHLORIDE BLD-SCNC: 102 MMOL/L (ref 98–111)
CO2: 29 MMOL/L (ref 21–31)
CREAT SERPL-MCNC: 0.65 MG/DL (ref 0.6–1.2)
CREATINE KINASE ISOENZYMES, SER/PLAS: 99 IU/L (ref 30–233)
EGFR (CKD-EPI): 107 ML/MIN/1.73 M2
EOSINOPHILS ABSOLUTE: 0.1 THOU/MCL (ref 0.03–0.45)
EOSINOPHILS RELATIVE PERCENT: 2 %
GLUCOSE BLD-MCNC: 79 MG/DL (ref 70–99)
HCT VFR BLD CALC: 38.5 % (ref 36–46)
HEMOGLOBIN: 12.9 G/DL (ref 12–15.2)
LYMPHOCYTES ABSOLUTE: 2.7 THOU/MCL (ref 1–4)
LYMPHOCYTES RELATIVE PERCENT: 34 %
MCH RBC QN AUTO: 27.1 PG (ref 27–33)
MCHC RBC AUTO-ENTMCNC: 33.4 G/DL (ref 32–36)
MCV RBC AUTO: 81.1 FL (ref 82–97)
MONOCYTES ABSOLUTE: 0.6 THOU/MCL (ref 0.2–0.9)
MONOCYTES RELATIVE PERCENT: 7 %
MYCOBACTERIUM SPEC CULT: NORMAL
NEUTROPHILS ABSOLUTE: 4.5 THOU/MCL (ref 1.8–7.7)
PDW BLD-RTO: 15.6 % (ref 12.3–17)
PLATELET # BLD: 205 THOU/MCL (ref 140–375)
PMV BLD AUTO: 7.8 FL (ref 7–11.5)
POTASSIUM SERPL-SCNC: 3.8 MMOL/L (ref 3.6–5.1)
RBC # BLD: 4.74 MIL/MCL (ref 3.8–5.2)
SED RATE, AUTOMATED: 26 MM/HR (ref 0–25)
SEG NEUTROPHILS: 56 %
SODIUM BLD-SCNC: 138 MMOL/L (ref 135–145)
TOTAL PROTEIN: 6.8 G/DL (ref 6–8)
WBC # BLD: 8 THOU/MCL (ref 3.6–10.5)

## 2025-07-08 RX ORDER — LISDEXAMFETAMINE DIMESYLATE 40 MG/1
40 CAPSULE ORAL DAILY
Qty: 30 CAPSULE | Refills: 0 | Status: SHIPPED | OUTPATIENT
Start: 2025-07-08 | End: 2025-08-07

## 2025-07-08 NOTE — TELEPHONE ENCOUNTER
OPAT End  Call made to pharmacy  LVM for Mike at [facility] and advised of Pharmacist note, ok to end IV abx and pull PICC as planned on 7/10.  Office contact information provided.       Call made to [facility]  Spoke with Garima CAPELLAN at [facility] and advised of above.  She v/u    Call made to patient  Yes     Will f/u in 1-2 days to verify line removal

## 2025-07-09 NOTE — TELEPHONE ENCOUNTER
Received notification from ID RN that patient has anxiety about POC.    Called patient-  \"Has had infection since January. Orthopedic team does not have a handle on this.\"   She is frustrated that it took from January to May to treat infection properly.    She is anxious that it is not all gone, and that if it comes back, she will have nobody to turn to.    She states she doesn't feel better enough. Still with pain.     I explained that there will always be some level of bone destruction and pain present. She may never be 100% pain free.    She states that she read that there can be scans done to see if it is gone. I explained that MRI/other imaging lags clinical resolution and that it is not recommended this early after treatment.     Overall, I hear that the patient is frustrated and concerned. I empathized with her but ultimately stated that we have offered best medical management for infection. The HW was removed, space washed out, and 6 weeks of IV abx given.     She is aware of the harms of prolonged abx exposure including line infections and resistance.  She also states that the abx in her urine and feces have now \"broken her septic tank.\"     I encouraged her to gain a second opinion from another ortho team that she could turn to if anything happens after coming off abx.     Happy to place referral to [facility] if patient wants as she has heard good things from them and was not happy with care provided by [facility].     Jaz Erazo, PharmD, BCPS  Clinical Pharmacy Specialist- [facility]  Phone: [phone] (also available on [employer])  Fax: [phone]    For Pharmacy Admin Tracking Only    Program: Medical Group  CPA in place: Yes  Time Spent (min): 60

## 2025-07-15 LAB
ACID FAST STN SPEC QL: NORMAL
MYCOBACTERIUM SPEC CULT: NORMAL

## 2025-07-17 ENCOUNTER — TRANSCRIBE ORDERS (OUTPATIENT)
Dept: ADMINISTRATIVE | Age: 50
End: 2025-07-17

## 2025-07-17 DIAGNOSIS — M79.605 PAIN OF LEFT LEG: Primary | ICD-10-CM

## 2025-07-31 DIAGNOSIS — D50.9 IRON DEFICIENCY ANEMIA, UNSPECIFIED IRON DEFICIENCY ANEMIA TYPE: Primary | ICD-10-CM

## 2025-07-31 RX ORDER — TIZANIDINE 2 MG/1
TABLET ORAL
Qty: 25 TABLET | Refills: 0 | Status: SHIPPED | OUTPATIENT
Start: 2025-07-31

## 2025-07-31 RX ORDER — FERROUS SULFATE 325(65) MG
325 TABLET, DELAYED RELEASE (ENTERIC COATED) ORAL
Qty: 45 TABLET | Refills: 0 | Status: SHIPPED | OUTPATIENT
Start: 2025-07-31 | End: 2025-07-31

## 2025-07-31 NOTE — TELEPHONE ENCOUNTER
Call patient to schedule appt.  She need the iron studies redone.  I can do a VV if easier for her .  Would hold off on refill until labs cked

## 2025-08-06 ENCOUNTER — TELEMEDICINE (OUTPATIENT)
Dept: FAMILY MEDICINE CLINIC | Age: 50
End: 2025-08-06
Payer: COMMERCIAL

## 2025-08-06 DIAGNOSIS — D50.9 IRON DEFICIENCY ANEMIA, UNSPECIFIED IRON DEFICIENCY ANEMIA TYPE: Primary | ICD-10-CM

## 2025-08-06 DIAGNOSIS — L08.9 FOOT INFECTION: ICD-10-CM

## 2025-08-06 DIAGNOSIS — R73.03 PREDIABETES: ICD-10-CM

## 2025-08-06 DIAGNOSIS — F90.2 ATTENTION DEFICIT HYPERACTIVITY DISORDER (ADHD), COMBINED TYPE: ICD-10-CM

## 2025-08-06 PROCEDURE — 99214 OFFICE O/P EST MOD 30 MIN: CPT | Performed by: INTERNAL MEDICINE

## 2025-08-06 PROCEDURE — 1036F TOBACCO NON-USER: CPT | Performed by: INTERNAL MEDICINE

## 2025-08-06 PROCEDURE — G8427 DOCREV CUR MEDS BY ELIG CLIN: HCPCS | Performed by: INTERNAL MEDICINE

## 2025-08-06 PROCEDURE — 3017F COLORECTAL CA SCREEN DOC REV: CPT | Performed by: INTERNAL MEDICINE

## 2025-08-06 PROCEDURE — G8417 CALC BMI ABV UP PARAM F/U: HCPCS | Performed by: INTERNAL MEDICINE

## 2025-08-06 RX ORDER — FERROUS SULFATE 325(65) MG
TABLET, DELAYED RELEASE (ENTERIC COATED) ORAL
Qty: 180 TABLET | Refills: 0 | Status: SHIPPED | OUTPATIENT
Start: 2025-08-06

## 2025-08-06 RX ORDER — LISDEXAMFETAMINE DIMESYLATE 40 MG/1
40 CAPSULE ORAL DAILY
Qty: 30 CAPSULE | Refills: 0 | Status: SHIPPED | OUTPATIENT
Start: 2025-08-06 | End: 2025-09-05

## 2025-08-06 RX ORDER — FERROUS SULFATE 325(65) MG
325 TABLET, DELAYED RELEASE (ENTERIC COATED) ORAL
Qty: 45 TABLET | Refills: 0 | Status: CANCELLED | OUTPATIENT
Start: 2025-08-06

## 2025-08-06 RX ORDER — LISDEXAMFETAMINE DIMESYLATE 40 MG/1
40 CAPSULE ORAL DAILY
Qty: 30 CAPSULE | Refills: 0 | Status: CANCELLED | OUTPATIENT
Start: 2025-08-06 | End: 2025-09-05

## 2025-08-07 DIAGNOSIS — D50.9 IRON DEFICIENCY ANEMIA, UNSPECIFIED IRON DEFICIENCY ANEMIA TYPE: ICD-10-CM

## 2025-08-07 DIAGNOSIS — L08.9 FOOT INFECTION: ICD-10-CM

## 2025-08-08 DIAGNOSIS — D50.9 IRON DEFICIENCY ANEMIA, UNSPECIFIED IRON DEFICIENCY ANEMIA TYPE: Primary | ICD-10-CM

## 2025-08-08 LAB
BASOPHILS # BLD: 0.1 K/UL (ref 0–0.2)
BASOPHILS NFR BLD: 0.8 %
DEPRECATED RDW RBC AUTO: 15.3 % (ref 12.4–15.4)
EOSINOPHIL # BLD: 0.1 K/UL (ref 0–0.6)
EOSINOPHIL NFR BLD: 0.7 %
FERRITIN SERPL IA-MCNC: 36 NG/ML (ref 15–150)
HCT VFR BLD AUTO: 40 % (ref 36–48)
HGB BLD-MCNC: 13.6 G/DL (ref 12–16)
IRON SATN MFR SERPL: 22 % (ref 15–50)
IRON SERPL-MCNC: 94 UG/DL (ref 37–145)
LYMPHOCYTES # BLD: 3.5 K/UL (ref 1–5.1)
LYMPHOCYTES NFR BLD: 27.7 %
MCH RBC QN AUTO: 27.5 PG (ref 26–34)
MCHC RBC AUTO-ENTMCNC: 34.1 G/DL (ref 31–36)
MCV RBC AUTO: 80.8 FL (ref 80–100)
MONOCYTES # BLD: 0.6 K/UL (ref 0–1.3)
MONOCYTES NFR BLD: 4.3 %
NEUTROPHILS # BLD: 8.4 K/UL (ref 1.7–7.7)
NEUTROPHILS NFR BLD: 66.5 %
PLATELET # BLD AUTO: 203 K/UL (ref 135–450)
PMV BLD AUTO: 9 FL (ref 5–10.5)
RBC # BLD AUTO: 4.95 M/UL (ref 4–5.2)
TIBC SERPL-MCNC: 433 UG/DL (ref 260–445)
WBC # BLD AUTO: 12.7 K/UL (ref 4–11)

## (undated) DEVICE — C-ARM PACK: Brand: C-ARM COVER

## (undated) DEVICE — GAUZE,SPONGE,4"X4",16PLY,XRAY,STRL,LF: Brand: MEDLINE

## (undated) DEVICE — BANDAGE COBAN 4 IN COMPR W4INXL5YD FOAM COHESIVE QUIK STK SELF ADH SFT

## (undated) DEVICE — SUTURE VICRYL + SZ 0 L27IN ABSRB VLT L26MM UR-6 5/8 CIR VCP603H

## (undated) DEVICE — 3M™ STERI-DRAPE™ U-DRAPE 1015: Brand: STERI-DRAPE™

## (undated) DEVICE — PADDING CAST W4INXL4YD HIGHLY ABSRB THAN COT EZ APPL

## (undated) DEVICE — TOWEL,OR,DSP,ST,BLUE,DLX,8/PK,10PK/CS: Brand: MEDLINE

## (undated) DEVICE — COUNTER NDL 40 COUNT HLD 70 NUM FOAM BLK SGL MAG W BLDE REMV

## (undated) DEVICE — GLOVE SURG SZ 65 L12IN FNGR THK79MIL GRN LTX FREE

## (undated) DEVICE — ROLL COT W11.5INXL11FT 1LB HIGHLY ABSRB SURG GRD

## (undated) DEVICE — PRECISION (9.0 X 0.51 X 25.0MM)

## (undated) DEVICE — COVER,TABLE,HEAVY DUTY,77"X90",STRL: Brand: MEDLINE

## (undated) DEVICE — BITE BLOCK ENDOSCP AD 60 FR W/ ADJ STRP PLAS GRN BLOX

## (undated) DEVICE — SOLUTION IV 1000ML 0.9% SOD CHL

## (undated) DEVICE — BANDAGE,ELASTIC,ESMARK,STERILE,6"X9',LF: Brand: MEDLINE

## (undated) DEVICE — TRAP FLUID

## (undated) DEVICE — DRESSING,GAUZE,XEROFORM,CURAD,1"X8",ST: Brand: CURAD

## (undated) DEVICE — STAPLER SKIN H3.9MM WIRE DIA0.58MM CRWN 6.9MM 35 STPL ROT

## (undated) DEVICE — STRIP,CLOSURE,WOUND,MEDI-STRIP,1/2X4: Brand: MEDLINE

## (undated) DEVICE — BLADE,CARBON-STEEL,15,STRL,DISPOSABLE,TB: Brand: MEDLINE

## (undated) DEVICE — TOURNIQUET SURGICAL EX LG BLACK WHITE HEMACLEAR

## (undated) DEVICE — APPLICATOR MEDICATED 26 CC SOLUTION HI LT ORNG CHLORAPREP

## (undated) DEVICE — GLOVE SURG SZ 85 L1185IN FNGR THK75MIL STRW LTX POLYMER

## (undated) DEVICE — INTENDED FOR TISSUE SEPARATION, AND OTHER PROCEDURES THAT REQUIRE A SHARP SURGICAL BLADE TO PUNCTURE OR CUT.: Brand: BARD-PARKER ® SAFETYLOCK CARBON RIB-BACK BLADES

## (undated) DEVICE — DRAPE 70X60IN SPLIT IMPERV ADHES STRIP

## (undated) DEVICE — FORCEPS BX 240CM 2.4MM L NDL RAD JAW 4 M00513334

## (undated) DEVICE — DRAPE EQUIP CARM PK W/PLATE PROTECTOR

## (undated) DEVICE — MICRO SAGITTAL BLADE OFFSET (4.7 X 0.38 X 25.4MM)

## (undated) DEVICE — RASP SURG CRV TN SAW CONVX R25 STRYKR STRL

## (undated) DEVICE — TOWEL,STOP FLAG GOLD N-W: Brand: MEDLINE

## (undated) DEVICE — COUNTERSINK SURG SM

## (undated) DEVICE — ENDOSCOPY KIT: Brand: MEDLINE INDUSTRIES, INC.

## (undated) DEVICE — PODIATRY: Brand: MEDLINE INDUSTRIES, INC.

## (undated) DEVICE — SYRINGE MED 10ML LUERLOCK TIP W/O SFTY DISP

## (undated) DEVICE — SUTURE VICRYL + SZ 3-0 L27IN ABSRB UD L26MM SH 1/2 CIR VCP416H

## (undated) DEVICE — PRECISION OFFSET (5.5 X 0.51 X 18.0MM)

## (undated) DEVICE — GUIDEWIRE ARTHSCP L 3.2MM DISP FOR 4.5/8.5MM BEAMING SYS

## (undated) DEVICE — SYRINGE MED 30ML STD CLR PLAS LUERLOCK TIP N CTRL DISP

## (undated) DEVICE — KIT ANGEL PRP

## (undated) DEVICE — DRAPE C ARM W54XL85IN MINI WITH POLY STRAP FOR FLUOROSCAN

## (undated) DEVICE — SUTURE FIBERLOOP SZ 2-0 L24IN NONABSORBABLE BLU L26.2MM 3/8 AR723202

## (undated) DEVICE — BIT DRL FENESTRATING STRL DISP OMNI

## (undated) DEVICE — SUTURE PDS + SZ 2 0 L27IN ABSRB VLT L26MM CT 2 1 2 CIR PDP333H

## (undated) DEVICE — 3M™ IOBAN™ 2 ANTIMICROBIAL INCISE DRAPE 6640EZ: Brand: IOBAN™ 2

## (undated) DEVICE — CONNECTOR TBNG WHT PLAS SUCT STR 5IN1 LTWT W/ M CONN

## (undated) DEVICE — DRAPE,U/ SHT,SPLIT,PLAS,STERIL: Brand: MEDLINE

## (undated) DEVICE — RASP SURG CRV TN SAW CONCV R25 STRYKR STRL

## (undated) DEVICE — STERILE VELCLOSE ELASTIC BANDAGE, 6IN: Brand: VELCLOSE

## (undated) DEVICE — FORMALIN CLEAR VIAL 20 ML 10%

## (undated) DEVICE — GLOVE SURG SZ 65 THK91MIL LTX FREE SYN POLYISOPRENE

## (undated) DEVICE — PRECISION OFFSET (9.0 X 0.64 X 35.0MM)

## (undated) DEVICE — SUTURE ETHILON SZ 3-0 L18IN NONABSORBABLE BLK L24MM FS-1 3/8 663G

## (undated) DEVICE — Device

## (undated) DEVICE — DRAPE ORTH 60X70IN POLY FLM ANCIL U RESIST FLAME TEARING

## (undated) DEVICE — 1010 S-DRAPE TOWEL DRAPE 10/BX: Brand: STERI-DRAPE™

## (undated) DEVICE — FOOT SWITCH DRAPE: Brand: UNBRANDED

## (undated) DEVICE — PROTECTOR ULN NRV PUR FOAM HK LOOP STRP ANATOMICALLY